# Patient Record
Sex: FEMALE | Race: ASIAN | NOT HISPANIC OR LATINO
[De-identification: names, ages, dates, MRNs, and addresses within clinical notes are randomized per-mention and may not be internally consistent; named-entity substitution may affect disease eponyms.]

---

## 2017-12-19 ENCOUNTER — APPOINTMENT (OUTPATIENT)
Dept: THORACIC SURGERY | Facility: CLINIC | Age: 77
End: 2017-12-19
Payer: MEDICARE

## 2017-12-19 VITALS
BODY MASS INDEX: 30.82 KG/M2 | HEART RATE: 66 BPM | HEIGHT: 65 IN | OXYGEN SATURATION: 96 % | DIASTOLIC BLOOD PRESSURE: 70 MMHG | SYSTOLIC BLOOD PRESSURE: 130 MMHG | WEIGHT: 185 LBS

## 2017-12-19 DIAGNOSIS — Z87.442 PERSONAL HISTORY OF URINARY CALCULI: ICD-10-CM

## 2017-12-19 DIAGNOSIS — M48.061 SPINAL STENOSIS, LUMBAR REGION WITHOUT NEUROGENIC CLAUDICATION: ICD-10-CM

## 2017-12-19 DIAGNOSIS — Z80.49 FAMILY HISTORY OF MALIGNANT NEOPLASM OF OTHER GENITAL ORGANS: ICD-10-CM

## 2017-12-19 DIAGNOSIS — Z87.39 PERSONAL HISTORY OF OTHER DISEASES OF THE MUSCULOSKELETAL SYSTEM AND CONNECTIVE TISSUE: ICD-10-CM

## 2017-12-19 DIAGNOSIS — Z80.3 FAMILY HISTORY OF MALIGNANT NEOPLASM OF BREAST: ICD-10-CM

## 2017-12-19 PROCEDURE — 99205 OFFICE O/P NEW HI 60 MIN: CPT

## 2018-01-03 ENCOUNTER — CHART COPY (OUTPATIENT)
Age: 78
End: 2018-01-03

## 2018-01-04 ENCOUNTER — OUTPATIENT (OUTPATIENT)
Dept: OUTPATIENT SERVICES | Facility: HOSPITAL | Age: 78
LOS: 1 days | End: 2018-01-04
Payer: MEDICARE

## 2018-01-04 ENCOUNTER — APPOINTMENT (OUTPATIENT)
Dept: PULMONOLOGY | Facility: CLINIC | Age: 78
End: 2018-01-04

## 2018-01-04 VITALS
RESPIRATION RATE: 14 BRPM | HEART RATE: 80 BPM | TEMPERATURE: 98 F | HEIGHT: 64 IN | WEIGHT: 188.05 LBS | SYSTOLIC BLOOD PRESSURE: 140 MMHG | DIASTOLIC BLOOD PRESSURE: 86 MMHG

## 2018-01-04 DIAGNOSIS — R91.8 OTHER NONSPECIFIC ABNORMAL FINDING OF LUNG FIELD: ICD-10-CM

## 2018-01-04 DIAGNOSIS — Z90.49 ACQUIRED ABSENCE OF OTHER SPECIFIED PARTS OF DIGESTIVE TRACT: Chronic | ICD-10-CM

## 2018-01-04 DIAGNOSIS — R06.83 SNORING: ICD-10-CM

## 2018-01-04 DIAGNOSIS — E11.9 TYPE 2 DIABETES MELLITUS WITHOUT COMPLICATIONS: ICD-10-CM

## 2018-01-04 DIAGNOSIS — R58 HEMORRHAGE, NOT ELSEWHERE CLASSIFIED: Chronic | ICD-10-CM

## 2018-01-04 LAB
BLD GP AB SCN SERPL QL: NEGATIVE — SIGNIFICANT CHANGE UP
BUN SERPL-MCNC: 19 MG/DL — SIGNIFICANT CHANGE UP (ref 7–23)
CALCIUM SERPL-MCNC: 9.4 MG/DL — SIGNIFICANT CHANGE UP (ref 8.4–10.5)
CHLORIDE SERPL-SCNC: 105 MMOL/L — SIGNIFICANT CHANGE UP (ref 98–107)
CO2 SERPL-SCNC: 26 MMOL/L — SIGNIFICANT CHANGE UP (ref 22–31)
CREAT SERPL-MCNC: 1.04 MG/DL — SIGNIFICANT CHANGE UP (ref 0.5–1.3)
GLUCOSE SERPL-MCNC: 115 MG/DL — HIGH (ref 70–99)
HBA1C BLD-MCNC: 6.9 % — HIGH (ref 4–5.6)
HCT VFR BLD CALC: 40.6 % — SIGNIFICANT CHANGE UP (ref 34.5–45)
HGB BLD-MCNC: 12.8 G/DL — SIGNIFICANT CHANGE UP (ref 11.5–15.5)
MCHC RBC-ENTMCNC: 30.9 PG — SIGNIFICANT CHANGE UP (ref 27–34)
MCHC RBC-ENTMCNC: 31.5 % — LOW (ref 32–36)
MCV RBC AUTO: 98.1 FL — SIGNIFICANT CHANGE UP (ref 80–100)
NRBC # FLD: 0 — SIGNIFICANT CHANGE UP
PLATELET # BLD AUTO: 188 K/UL — SIGNIFICANT CHANGE UP (ref 150–400)
PMV BLD: 10 FL — SIGNIFICANT CHANGE UP (ref 7–13)
POTASSIUM SERPL-MCNC: 4.7 MMOL/L — SIGNIFICANT CHANGE UP (ref 3.5–5.3)
POTASSIUM SERPL-SCNC: 4.7 MMOL/L — SIGNIFICANT CHANGE UP (ref 3.5–5.3)
RBC # BLD: 4.14 M/UL — SIGNIFICANT CHANGE UP (ref 3.8–5.2)
RBC # FLD: 13.4 % — SIGNIFICANT CHANGE UP (ref 10.3–14.5)
RH IG SCN BLD-IMP: POSITIVE — SIGNIFICANT CHANGE UP
SODIUM SERPL-SCNC: 144 MMOL/L — SIGNIFICANT CHANGE UP (ref 135–145)
WBC # BLD: 5.38 K/UL — SIGNIFICANT CHANGE UP (ref 3.8–10.5)
WBC # FLD AUTO: 5.38 K/UL — SIGNIFICANT CHANGE UP (ref 3.8–10.5)

## 2018-01-04 PROCEDURE — 93010 ELECTROCARDIOGRAM REPORT: CPT

## 2018-01-04 RX ORDER — SODIUM CHLORIDE 9 MG/ML
1000 INJECTION, SOLUTION INTRAVENOUS
Qty: 0 | Refills: 0 | Status: DISCONTINUED | OUTPATIENT
Start: 2018-01-10 | End: 2018-01-12

## 2018-01-04 RX ORDER — METFORMIN HYDROCHLORIDE 850 MG/1
0.5 TABLET ORAL
Qty: 0 | Refills: 0 | COMMUNITY

## 2018-01-04 RX ORDER — GLYBURIDE 5 MG
1 TABLET ORAL
Qty: 0 | Refills: 0 | COMMUNITY

## 2018-01-04 RX ORDER — GLYBURIDE 5 MG
0 TABLET ORAL
Qty: 0 | Refills: 0 | COMMUNITY

## 2018-01-04 RX ORDER — AZTREONAM 2 G
1 VIAL (EA) INJECTION
Qty: 0 | Refills: 0 | COMMUNITY

## 2018-01-04 RX ORDER — ERGOCALCIFEROL 1.25 MG/1
1 CAPSULE ORAL
Qty: 0 | Refills: 0 | COMMUNITY

## 2018-01-04 RX ORDER — METFORMIN HYDROCHLORIDE 850 MG/1
1 TABLET ORAL
Qty: 0 | Refills: 0 | COMMUNITY

## 2018-01-04 NOTE — H&P PST ADULT - SOURCE OF INFORMATION, PROFILE
patient/authorized [place] cell [phone] and daughter, Ama Cam [phone] to receive information; , Gen cell [phone]; home [phone] patient/[place]  # [id number]; authorized [place] cell [phone] and daughter, Ama Cam [phone] to receive information; , Gen cell [phone]; home [phone] patient/[place]  # [id number]; authorized daughter, Karen cell [phone] and daughter, Ama Cam [phone] to receive information; , Gen cell [phone]; home [phone]

## 2018-01-04 NOTE — H&P PST ADULT - ABILITY TO HEAR (WITH HEARING AID OR HEARING APPLIANCE IF NORMALLY USED):
Mildly to Moderately Impaired: difficulty hearing in some environments or speaker may need to increase volume or speak distinctly/Pt. feels she has mild hearing loss for her age.

## 2018-01-04 NOTE — H&P PST ADULT - PROBLEM SELECTOR PLAN 3
Await pre-arranged cardiac evaluation with cardiologist -- copy on chart  * Await medical evaluation with pcp due to fact that patient and daughter, Karen, were poor historians (changed answers multiple times with requirement for asking multiple questions to receive accurate answer)  * Await old ekg for comparison  * Notified OR booking via fax of DM type 2  * Need to notify surgeon of pre op medical evaluation Await pre-arranged cardiac evaluation with cardiologist -- copy on chart  * Await medical evaluation with pcp due to fact that patient and daughter, Karen, were poor historians (changed answers multiple times with requirement for asking multiple questions to receive accurate answer)  * Await old ekg for comparison and echo from cardiologist  * Notified OR booking via fax of DM type 2  * Need to notify surgeon of pre op medical evaluation Await pre-arranged cardiac evaluation with cardiologist -- copy on chart  * Await medical evaluation with pcp due to fact that patient and daughter, Karen, were poor historians (changed answers multiple times with requirement for asking multiple questions to receive accurate answer) and needs medical evaluation due to fact that patient is NOT taking diabetic medications as prescribed. PCP to determine what diabetic medications to take pre operatively  * Await old ekg for comparison and echo from cardiologist  * Notified OR booking via fax of DM type 2  * Need to notify surgeon of pre op medical evaluation Await pre-arranged cardiac evaluation with cardiologist -- copy on chart  * Await medical evaluation with pcp due to fact that patient and daughter, Karen, were poor historians (changed answers multiple times with requirement for asking multiple questions to receive accurate answer) and needs medical evaluation due to fact that patient is NOT taking diabetic medications as prescribed. PCP to determine what diabetic medications to take pre operatively  * Await old ekg for comparison and echo from cardiologist  * Notified OR booking via fax of DM type 2  * Need to notify surgeon of pre op medical evaluation--spoke to Ene in surgeon's office

## 2018-01-04 NOTE — H&P PST ADULT - ACTIVITY
c/o SOB with stair climbing patient questionned multiple times whether or not she gets SOB. With , she denied becoming SOB patient questioned multiple times whether or not she gets SOB. Through the , she denied becoming SOB

## 2018-01-04 NOTE — H&P PST ADULT - PROBLEM SELECTOR PLAN 1
This is a 78 y/o female who is scheduled for flexible bronch, right VATS, lung resection on 1-10-18  * Instructed to take normal am dose of    the am of surgery This is a 78 y/o female who is scheduled for flexible bronch, right VATS, lung resection on 1-10-18  * Instructed to take normal am dose of Levoxyl and atenolol the am of surgery

## 2018-01-04 NOTE — H&P PST ADULT - OTHER CARE PROVIDERS
urologist, Dr. Gonzalez Velasquez -- cell [phone]; pulmonologist -- ([facility] -- Dr. cedrick Caba [phone]

## 2018-01-04 NOTE — H&P PST ADULT - VISION (WITH CORRECTIVE LENSES IF THE PATIENT USUALLY WEARS THEM):
Glasses for reading./Partially impaired: cannot see medication labels or newsprint, but can see obstacles in path, and the surrounding layout; can count fingers at arm's length

## 2018-01-04 NOTE — H&P PST ADULT - ENDOCRINE COMMENTS
hypothyroid; DM type II -- does finger sticks in the am -- range 100-120 (patient cuts her diabetic oral medications in half -- ? if pcp is aware of this)

## 2018-01-04 NOTE — H&P PST ADULT - PMH
Diabetes mellitus  type II, diagnosed approximately 2008  Hypercholesterolemia    Hypothyroid    Language barrier  native language Mandarin, Chinese; comprehends and verbalizes no Englsih  Lung mass  b/l lung masses; right side has uptake on PET scan  Lung mass  right lung in Jan. 2018  Obesity    Snoring  BETITO precautions -- responds affirmatively to STOP BANG questionnaire -- admits to loud snoring; age > 50; h/o htn Arthritis    Diabetes mellitus  type II, diagnosed approximately 2008  Hypercholesterolemia    Hypothyroid    Insomnia    Language barrier  native language Mandarin, Chinese; comprehends and verbalizes no Englsih  Lung mass  b/l lung masses; right side has uptake on PET scan  Lung mass  right lung in Jan. 2018  Nephrolithiasis  9 mm kidney stone -- to start antibiotic today (1-4-18) and have placement of a stent by urologist before right VATS on 1-10-18  Obesity    Sciatica  injection done in Dec. 2017  Seasonal allergies    Snoring  BETITO precautions -- responds affirmatively to STOP BANG questionnaire -- admits to loud snoring; age > 50; h/o htn Arthritis    Diabetes mellitus  type II, diagnosed approximately 2008  Hypercholesterolemia    Hypertension    Hypothyroid    Insomnia    Language barrier  native language Mandarin, Chinese; comprehends and verbalizes no Englsih  Lung mass  b/l lung masses; right side has uptake on PET scan  Lung mass  right lung in Jan. 2018  Nephrolithiasis  9 mm kidney stone -- to start antibiotic today (1-4-18) and have placement of a stent by urologist before right VATS on 1-10-18  Obesity    Sciatica  injection done in Dec. 2017  Seasonal allergies    Snoring  BETITO precautions -- responds affirmatively to STOP BANG questionnaire -- admits to loud snoring; age > 50; h/o htn  Vitamin D deficiency

## 2018-01-04 NOTE — H&P PST ADULT - HISTORY OF PRESENT ILLNESS
This is a 76 y/o female whose native language is Chinese, Mandarin; comprehends and verbalizes no English. Patient required use of ; utilized [employer]  # [id number]. Patient and daughter are very poor historians despite use of . According to Dr. Young's consult of 12-19-17, patient presents with persistent cough. Unsuccessful treatment with antibiotics. Subsequent CT scan and PET scan revealed b/l lung nodules but only the right lung nodule had uptake with the PET scan. Intervention recommended. Scheduled for flexible bronch, right VATS, lung resection on 1-10-18 This is a 78 y/o female whose native language is Chinese, Mandarin; comprehends and verbalizes no English. Patient required use of ; utilized [employer]  # [id number]. Patient and daughter are very poor historians despite use of . According to Dr. Young's consult of 12-19-17, patient presents with persistent cough. Unsuccessful treatment with antibiotics. Subsequent CT scan and PET scan revealed b/l lung nodules but only the right lung nodule had uptake with the PET scan. Intervention recommended. Scheduled for flexible bronch, right VATS, lung resection on 1-10-18.  (NOTE: has 9 mm kidney stone with stent to be inserted on Friday 1-5-18 or on following Monday by the urologist)

## 2018-01-04 NOTE — H&P PST ADULT - PAIN CHRONIC, PROFILE
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  Steroid injection started 3 weeks ago."/yes

## 2018-01-04 NOTE — H&P PST ADULT - RESPIRATORY AND THORAX COMMENTS
c/o intermittent wheezing; has persistent cough;[phone]: c/o SOB with stair climbing has persistent cough; Denies SOB

## 2018-01-05 ENCOUNTER — APPOINTMENT (OUTPATIENT)
Dept: PULMONOLOGY | Facility: CLINIC | Age: 78
End: 2018-01-05
Payer: MEDICARE

## 2018-01-05 PROCEDURE — 94729 DIFFUSING CAPACITY: CPT

## 2018-01-05 PROCEDURE — 94726 PLETHYSMOGRAPHY LUNG VOLUMES: CPT

## 2018-01-05 PROCEDURE — 94010 BREATHING CAPACITY TEST: CPT

## 2018-01-09 ENCOUNTER — CHART COPY (OUTPATIENT)
Age: 78
End: 2018-01-09

## 2018-01-10 ENCOUNTER — INPATIENT (INPATIENT)
Facility: HOSPITAL | Age: 78
LOS: 3 days | Discharge: HOME | End: 2018-01-14
Attending: THORACIC SURGERY (CARDIOTHORACIC VASCULAR SURGERY) | Admitting: THORACIC SURGERY (CARDIOTHORACIC VASCULAR SURGERY)
Payer: MEDICARE

## 2018-01-10 ENCOUNTER — APPOINTMENT (OUTPATIENT)
Dept: THORACIC SURGERY | Facility: HOSPITAL | Age: 78
End: 2018-01-10
Payer: MEDICARE

## 2018-01-10 ENCOUNTER — RESULT REVIEW (OUTPATIENT)
Age: 78
End: 2018-01-10

## 2018-01-10 VITALS
SYSTOLIC BLOOD PRESSURE: 125 MMHG | RESPIRATION RATE: 17 BRPM | OXYGEN SATURATION: 96 % | DIASTOLIC BLOOD PRESSURE: 59 MMHG | HEIGHT: 64 IN | HEART RATE: 63 BPM | TEMPERATURE: 98 F | WEIGHT: 188.05 LBS

## 2018-01-10 DIAGNOSIS — R58 HEMORRHAGE, NOT ELSEWHERE CLASSIFIED: Chronic | ICD-10-CM

## 2018-01-10 DIAGNOSIS — Z90.49 ACQUIRED ABSENCE OF OTHER SPECIFIED PARTS OF DIGESTIVE TRACT: Chronic | ICD-10-CM

## 2018-01-10 DIAGNOSIS — R91.8 OTHER NONSPECIFIC ABNORMAL FINDING OF LUNG FIELD: ICD-10-CM

## 2018-01-10 LAB
GLUCOSE BLDC GLUCOMTR-MCNC: 120 MG/DL — HIGH (ref 70–99)
GLUCOSE BLDC GLUCOMTR-MCNC: 186 MG/DL — HIGH (ref 70–99)
GLUCOSE BLDC GLUCOMTR-MCNC: 188 MG/DL — HIGH (ref 70–99)
RH IG SCN BLD-IMP: POSITIVE — SIGNIFICANT CHANGE UP

## 2018-01-10 PROCEDURE — 99233 SBSQ HOSP IP/OBS HIGH 50: CPT

## 2018-01-10 PROCEDURE — S2900 ROBOTIC SURGICAL SYSTEM: CPT | Mod: NC

## 2018-01-10 PROCEDURE — 32663 THORACOSCOPY W/LOBECTOMY: CPT | Mod: AS

## 2018-01-10 PROCEDURE — 32663 THORACOSCOPY W/LOBECTOMY: CPT

## 2018-01-10 PROCEDURE — 32674 THORACOSCOPY LYMPH NODE EXC: CPT | Mod: AS

## 2018-01-10 PROCEDURE — 88309 TISSUE EXAM BY PATHOLOGIST: CPT | Mod: 26

## 2018-01-10 PROCEDURE — 71045 X-RAY EXAM CHEST 1 VIEW: CPT | Mod: 26

## 2018-01-10 PROCEDURE — 88305 TISSUE EXAM BY PATHOLOGIST: CPT | Mod: 26

## 2018-01-10 PROCEDURE — 88331 PATH CONSLTJ SURG 1 BLK 1SPC: CPT | Mod: 26

## 2018-01-10 PROCEDURE — 31622 DX BRONCHOSCOPE/WASH: CPT

## 2018-01-10 PROCEDURE — 32674 THORACOSCOPY LYMPH NODE EXC: CPT

## 2018-01-10 PROCEDURE — 88313 SPECIAL STAINS GROUP 2: CPT | Mod: 26

## 2018-01-10 RX ORDER — IPRATROPIUM/ALBUTEROL SULFATE 18-103MCG
3 AEROSOL WITH ADAPTER (GRAM) INHALATION EVERY 6 HOURS
Qty: 0 | Refills: 0 | Status: DISCONTINUED | OUTPATIENT
Start: 2018-01-10 | End: 2018-01-11

## 2018-01-10 RX ORDER — SIMVASTATIN 20 MG/1
20 TABLET, FILM COATED ORAL AT BEDTIME
Qty: 0 | Refills: 0 | Status: DISCONTINUED | OUTPATIENT
Start: 2018-01-10 | End: 2018-01-14

## 2018-01-10 RX ORDER — NALOXONE HYDROCHLORIDE 4 MG/.1ML
0.1 SPRAY NASAL
Qty: 0 | Refills: 0 | Status: DISCONTINUED | OUTPATIENT
Start: 2018-01-10 | End: 2018-01-12

## 2018-01-10 RX ORDER — INSULIN LISPRO 100/ML
VIAL (ML) SUBCUTANEOUS
Qty: 0 | Refills: 0 | Status: DISCONTINUED | OUTPATIENT
Start: 2018-01-10 | End: 2018-01-14

## 2018-01-10 RX ORDER — HYDROMORPHONE HYDROCHLORIDE 2 MG/ML
0.5 INJECTION INTRAMUSCULAR; INTRAVENOUS; SUBCUTANEOUS
Qty: 0 | Refills: 0 | Status: DISCONTINUED | OUTPATIENT
Start: 2018-01-10 | End: 2018-01-12

## 2018-01-10 RX ORDER — DOCUSATE SODIUM 100 MG
100 CAPSULE ORAL THREE TIMES A DAY
Qty: 0 | Refills: 0 | Status: DISCONTINUED | OUTPATIENT
Start: 2018-01-10 | End: 2018-01-14

## 2018-01-10 RX ORDER — DEXTROSE 50 % IN WATER 50 %
25 SYRINGE (ML) INTRAVENOUS ONCE
Qty: 0 | Refills: 0 | Status: DISCONTINUED | OUTPATIENT
Start: 2018-01-10 | End: 2018-01-14

## 2018-01-10 RX ORDER — HEPARIN SODIUM 5000 [USP'U]/ML
5000 INJECTION INTRAVENOUS; SUBCUTANEOUS EVERY 8 HOURS
Qty: 0 | Refills: 0 | Status: DISCONTINUED | OUTPATIENT
Start: 2018-01-10 | End: 2018-01-14

## 2018-01-10 RX ORDER — PANTOPRAZOLE SODIUM 20 MG/1
40 TABLET, DELAYED RELEASE ORAL
Qty: 0 | Refills: 0 | Status: DISCONTINUED | OUTPATIENT
Start: 2018-01-10 | End: 2018-01-14

## 2018-01-10 RX ORDER — DEXTROSE 50 % IN WATER 50 %
12.5 SYRINGE (ML) INTRAVENOUS ONCE
Qty: 0 | Refills: 0 | Status: DISCONTINUED | OUTPATIENT
Start: 2018-01-10 | End: 2018-01-14

## 2018-01-10 RX ORDER — GLUCAGON INJECTION, SOLUTION 0.5 MG/.1ML
1 INJECTION, SOLUTION SUBCUTANEOUS ONCE
Qty: 0 | Refills: 0 | Status: DISCONTINUED | OUTPATIENT
Start: 2018-01-10 | End: 2018-01-14

## 2018-01-10 RX ORDER — ACETAMINOPHEN 500 MG
1000 TABLET ORAL ONCE
Qty: 0 | Refills: 0 | Status: DISCONTINUED | OUTPATIENT
Start: 2018-01-11 | End: 2018-01-14

## 2018-01-10 RX ORDER — SODIUM CHLORIDE 9 MG/ML
1000 INJECTION, SOLUTION INTRAVENOUS
Qty: 0 | Refills: 0 | Status: DISCONTINUED | OUTPATIENT
Start: 2018-01-10 | End: 2018-01-14

## 2018-01-10 RX ORDER — HYDROMORPHONE HYDROCHLORIDE 2 MG/ML
30 INJECTION INTRAMUSCULAR; INTRAVENOUS; SUBCUTANEOUS
Qty: 0 | Refills: 0 | Status: DISCONTINUED | OUTPATIENT
Start: 2018-01-10 | End: 2018-01-12

## 2018-01-10 RX ORDER — DIPHENHYDRAMINE HCL 50 MG
25 CAPSULE ORAL EVERY 4 HOURS
Qty: 0 | Refills: 0 | Status: DISCONTINUED | OUTPATIENT
Start: 2018-01-10 | End: 2018-01-12

## 2018-01-10 RX ORDER — LEVOTHYROXINE SODIUM 125 MCG
75 TABLET ORAL DAILY
Qty: 0 | Refills: 0 | Status: DISCONTINUED | OUTPATIENT
Start: 2018-01-10 | End: 2018-01-14

## 2018-01-10 RX ORDER — ONDANSETRON 8 MG/1
4 TABLET, FILM COATED ORAL EVERY 6 HOURS
Qty: 0 | Refills: 0 | Status: DISCONTINUED | OUTPATIENT
Start: 2018-01-10 | End: 2018-01-12

## 2018-01-10 RX ORDER — ACETAMINOPHEN 500 MG
1000 TABLET ORAL ONCE
Qty: 0 | Refills: 0 | Status: COMPLETED | OUTPATIENT
Start: 2018-01-10 | End: 2018-01-10

## 2018-01-10 RX ORDER — DEXTROSE 50 % IN WATER 50 %
1 SYRINGE (ML) INTRAVENOUS ONCE
Qty: 0 | Refills: 0 | Status: DISCONTINUED | OUTPATIENT
Start: 2018-01-10 | End: 2018-01-14

## 2018-01-10 RX ORDER — SENNA PLUS 8.6 MG/1
2 TABLET ORAL AT BEDTIME
Qty: 0 | Refills: 0 | Status: DISCONTINUED | OUTPATIENT
Start: 2018-01-10 | End: 2018-01-14

## 2018-01-10 RX ADMIN — Medication 100 MILLIGRAM(S): at 13:58

## 2018-01-10 RX ADMIN — Medication 1000 MILLIGRAM(S): at 17:00

## 2018-01-10 RX ADMIN — HYDROMORPHONE HYDROCHLORIDE 30 MILLILITER(S): 2 INJECTION INTRAMUSCULAR; INTRAVENOUS; SUBCUTANEOUS at 18:07

## 2018-01-10 RX ADMIN — Medication 100 MILLIGRAM(S): at 22:17

## 2018-01-10 RX ADMIN — Medication 3 MILLILITER(S): at 16:33

## 2018-01-10 RX ADMIN — Medication 1: at 16:12

## 2018-01-10 RX ADMIN — SENNA PLUS 2 TABLET(S): 8.6 TABLET ORAL at 22:17

## 2018-01-10 RX ADMIN — Medication 400 MILLIGRAM(S): at 16:38

## 2018-01-10 RX ADMIN — HEPARIN SODIUM 5000 UNIT(S): 5000 INJECTION INTRAVENOUS; SUBCUTANEOUS at 13:58

## 2018-01-10 RX ADMIN — SIMVASTATIN 20 MILLIGRAM(S): 20 TABLET, FILM COATED ORAL at 22:17

## 2018-01-10 RX ADMIN — Medication 1 TABLET(S): at 17:33

## 2018-01-10 RX ADMIN — Medication 3 MILLILITER(S): at 23:57

## 2018-01-10 RX ADMIN — HEPARIN SODIUM 5000 UNIT(S): 5000 INJECTION INTRAVENOUS; SUBCUTANEOUS at 22:17

## 2018-01-10 RX ADMIN — HYDROMORPHONE HYDROCHLORIDE 30 MILLILITER(S): 2 INJECTION INTRAMUSCULAR; INTRAVENOUS; SUBCUTANEOUS at 19:39

## 2018-01-10 RX ADMIN — SODIUM CHLORIDE 30 MILLILITER(S): 9 INJECTION, SOLUTION INTRAVENOUS at 19:42

## 2018-01-10 RX ADMIN — SODIUM CHLORIDE 30 MILLILITER(S): 9 INJECTION, SOLUTION INTRAVENOUS at 11:00

## 2018-01-10 NOTE — BRIEF OPERATIVE NOTE - COMMENTS
Suction catheter left in during RULobe bronchus resection, proximal staple line visualized w/ flex bronch w/ distorted stable, distal suction catheter retrieved from sample. Distal stable line intact, no airleak under direct visualization w/ chest irrigation

## 2018-01-10 NOTE — ASU PREOP CHECKLIST - SELECT TESTS ORDERED
BMP/CBC/Type and Cross/Type and Screen/EKG BMP//CBC/Type and Cross/Type and Screen/POCT Blood Glucose/EKG

## 2018-01-10 NOTE — ASU PATIENT PROFILE, ADULT - VISION (WITH CORRECTIVE LENSES IF THE PATIENT USUALLY WEARS THEM):
Partially impaired: cannot see medication labels or newsprint, but can see obstacles in path, and the surrounding layout; can count fingers at arm's length/Glasses for reading.

## 2018-01-10 NOTE — BRIEF OPERATIVE NOTE - PROCEDURE
<<-----Click on this checkbox to enter Procedure VATS (video-assisted thoracoscopic surgery)  01/10/2018  Flex bronch, RVATS, RULobectomy, RML wedge, MLND  Active  [id number]

## 2018-01-10 NOTE — PROGRESS NOTE ADULT - SUBJECTIVE AND OBJECTIVE BOX
[address]            MRN-[id number]         No Known Allergies                 HPI:  This is a 76 y/o female whose native language is Chinese, Mandarin; comprehends and verbalizes no English. Patient required use of ; utilized [employer]  # [id number]. Patient and daughter are very poor historians despite use of . According to Dr. Young's consult of 12-19-17, patient presents with persistent cough. Unsuccessful treatment with antibiotics. Subsequent CT scan and PET scan revealed b/l lung nodules but only the right lung nodule had uptake with the PET scan. Intervention recommended. Scheduled for flexible bronch, right VATS, lung resection on 1-10-18.  (NOTE: has 9 mm kidney stone with stent to be inserted on Friday 1-5-18 or on following Monday by the urologist) (04 Jan 2018 12:51)      Procedure: Flex bronch, RVATS, RULobectomy, RML wedge, MLND   POD# 0    Issues:  Lung cancer  Postop pain  HTN  HLD  DM-2               Home Medications:  atenolol 50 mg oral tablet: 1 tab(s) orally once a day. AM (10 Jeremiah 2018 06:48)  glucosamine: 1 tab(s) orally once a day. Last dose 1/4/18 (10 Jeremiah 2018 06:48)  glyBURIDE 3 mg orally at night -- patient not taking medication as prescribed. She only takes 1/2 tablet at night:  (10 Jeremiah 2018 06:48)  Levoxyl 75 mcg (0.075 mg) oral tablet: 1 tab(s) orally once a day. AM (10 Jeremiah 2018 06:48)  metFORMIN 850 mg orally in the evening. Patient not taking medication as prescribed. She only takes 1/2 the tablet after dinner:  (10 Jeremiah 2018 06:48)  simvastatin 20 mg oral tablet: 1 tab(s) orally once a day (at bedtime) (10 Jeremiah 2018 06:48)  sulfamethoxazole-trimethoprim 800 mg-160 mg oral tablet: 1 tab(s) orally 2 times a day (10 Jeremiah 2018 06:48)  Therapeutic Multiple Vitamins oral tablet: 1 tab(s) orally once a day. LAst dose 1/4/18 (10 Jeremiah 2018 06:48)  Vitamin D2 50,000 intl units (1.25 mg) oral capsule: 1 cap(s) orally once a week. Friday (10 Jeremiah 2018 06:48)  zolpidem 10 mg oral tablet: 0.5 tab(s) orally once a day (at bedtime), As Needed (10 Jeremiah 2018 06:48)      PAST MEDICAL & SURGICAL HISTORY:  Vitamin D deficiency  Hypertension  Arthritis  Sciatica: injection done in Dec. 2017  Insomnia  Seasonal allergies  Nephrolithiasis: 9 mm kidney stone -- to start antibiotic today (1-4-18) and have placement of a stent by urologist before right VATS on 1-10-18  Snoring: BETITO precautions -- responds affirmatively to STOP BANG questionnaire -- admits to loud snoring; age &gt; 50; h/o htn  Lung mass: b/l lung masses; right side has uptake on PET scan  Lung mass: right lung in Jan. 2018  Obesity  Hypercholesterolemia  Diabetes mellitus: type II, diagnosed approximately 2008  Hypothyroid  Language barrier: native language Mandarin, Chinese; comprehends and verbalizes no Englsih  S/P appendectomy: greater than 30 years ago  Bleeding: hysterectomy 1985        ICU Vital Signs Last 24 Hrs  T(C): 35.3 (10 Jeremiah 2018 11:10), Max: 36.4 (10 Jeremiah 2018 06:17)  T(F): 95.6 (10 Jeremiah 2018 11:10), Max: 97.5 (10 Jeremiah 2018 06:17)  HR: 85 (10 Jeremiah 2018 12:00) (63 - 128)  BP: 134/77 (10 Jeremiah 2018 12:00) (84/34 - 136/71)  BP(mean): 87 (10 Jeremiah 2018 12:00) (44 - 87)  ABP: --  ABP(mean): --  RR: 16 (10 Jeremiah 2018 12:00) (12 - 19)  SpO2: 100% (10 Jeremiah 2018 12:00) (96% - 100%)    I&O's Detail    10 Jeremiah 2018 07:01  -  10 Jeremiah 2018 12:35  --------------------------------------------------------  IN:    lactated ringers.: 60 mL  Total IN: 60 mL    OUT:    Chest Tube: 196 mL  Total OUT: 196 mL    Total NET: -136 mL        CAPILLARY BLOOD GLUCOSE      POCT Blood Glucose.: 186 mg/dL (10 Jeremiah 2018 11:50)      Home Medications:  atenolol 50 mg oral tablet: 1 tab(s) orally once a day. AM (10 Jeremiah 2018 06:48)  glucosamine: 1 tab(s) orally once a day. Last dose 1/4/18 (10 Jeremiah 2018 06:48)  glyBURIDE 3 mg orally at night -- patient not taking medication as prescribed. She only takes 1/2 tablet at night:  (10 Jeremiah 2018 06:48)  Levoxyl 75 mcg (0.075 mg) oral tablet: 1 tab(s) orally once a day. AM (10 Jeremiah 2018 06:48)  metFORMIN 850 mg orally in the evening. Patient not taking medication as prescribed. She only takes 1/2 the tablet after dinner:  (10 Jeremiah 2018 06:48)  simvastatin 20 mg oral tablet: 1 tab(s) orally once a day (at bedtime) (10 Jeremiah 2018 06:48)  sulfamethoxazole-trimethoprim 800 mg-160 mg oral tablet: 1 tab(s) orally 2 times a day (10 Jeremiah 2018 06:48)  Therapeutic Multiple Vitamins oral tablet: 1 tab(s) orally once a day. LAst dose 1/4/18 (10 Jeremiah 2018 06:48)  Vitamin D2 50,000 intl units (1.25 mg) oral capsule: 1 cap(s) orally once a week. Friday (10 Jeremiah 2018 06:48)  zolpidem 10 mg oral tablet: 0.5 tab(s) orally once a day (at bedtime), As Needed (10 Jeremiah 2018 06:48)      MEDICATIONS  (STANDING):  ALBUTerol/ipratropium for Nebulization 3 milliLiter(s) Nebulizer every 6 hours  dextrose 5%. 1000 milliLiter(s) (50 mL/Hr) IV Continuous <Continuous>  dextrose 50% Injectable 12.5 Gram(s) IV Push once  dextrose 50% Injectable 25 Gram(s) IV Push once  dextrose 50% Injectable 25 Gram(s) IV Push once  docusate sodium 100 milliGRAM(s) Oral three times a day  heparin  Injectable 5000 Unit(s) SubCutaneous every 8 hours  HYDROmorphone PCA (1 mG/mL) 30 milliLiter(s) PCA Continuous PCA Continuous  insulin lispro (HumaLOG) corrective regimen sliding scale   SubCutaneous three times a day before meals  lactated ringers. 1000 milliLiter(s) (30 mL/Hr) IV Continuous <Continuous>  levothyroxine 75 MICROGram(s) Oral daily  pantoprazole    Tablet 40 milliGRAM(s) Oral before breakfast  senna 2 Tablet(s) Oral at bedtime  simvastatin 20 milliGRAM(s) Oral at bedtime  trimethoprim  160 mG/sulfamethoxazole 800 mG 1 Tablet(s) Oral two times a day    MEDICATIONS  (PRN):  dextrose Gel 1 Dose(s) Oral once PRN Blood Glucose LESS THAN 70 milliGRAM(s)/deciliter  diphenhydrAMINE   Injectable 25 milliGRAM(s) IV Push every 4 hours PRN Pruritus  glucagon  Injectable 1 milliGRAM(s) IntraMuscular once PRN Glucose LESS THAN 70 milligrams/deciliter  HYDROmorphone PCA (1 mG/mL) Rescue Clinician Bolus 0.5 milliGRAM(s) IV Push every 15 minutes PRN for Pain Scale GREATER THAN 6  naloxone Injectable 0.1 milliGRAM(s) IV Push every 3 minutes PRN For ANY of the following changes in patient status:  A. RR LESS THAN 10 breaths per minute, B. Oxygen saturation LESS THAN 90%, C. Sedation score of 6  ondansetron Injectable 4 milliGRAM(s) IV Push every 6 hours PRN Nausea      Physical exam:                             General:               Pt is awake, not in any distress                                                  Neuro:                  Nonfocal                             Cardiovascular:   S1 & S2, regular                           Respiratory:         Air entry is fair and equal on both sides, has bilateral conducted sounds                           GI:                          Soft, nondistended and nontender, Bowel sounds active                            Ext:                        No cyanosis or edema     Labs:                                                                CXR:   Rt side chest tube in place, postop changes.      Plan:    General: 77yFemale s/p Flex bronch, RVATS, RULobectomy, RML wedge, MLND  POD#0  progressing well, experiencing  pain with deep breathing.                             Neuro:                                         Pain control with PCA / Tylenol IV                            Cardiovascular:                                          Continue hemodynamic monitoring.    HTN: Restart Atenolol    HLD: Restart Lipitor                            Respiratory:                                         Pt is on 2L  nasal canula,                                           Comfortable, not in any distress.                                         Using incentive spirometry                                          Monitor chest tube output                                         Chest tube to suction                                                                  Continue bronchodilators, pulmonary toilet                            GI                                         On clear liquids                                         Continue GI prophylaxis with Protonix                                         Continue Zofran / Reglan for nausea - PRN	                                                                 Renal:                                         Continue LR 30cc/hr                                         Monitor I/Os and electrolytes                                         Parker                                                  Hem/ Onc:                                                                                  Monitor chest tube output &  signs of bleeding.                                          Follow CBC in AM                           Infectious disease:                                            No signs of infection. Monitor for fever / leukocytosis.                                          All surgical incision / chest tube  sites look clean                            Endocrine                                            DM-2: Continue Accu-Checks with coverage. Hold oral hypoglycemic meds    Pt is on SQ Heparin and Venodyne boots for DVT prophylaxis.     Pertinent clinical, laboratory, radiographic, hemodynamic, echocardiographic, respiratory data, microbiologic data and chart were reviewed and analyzed frequently throughout the course of the day and night  Patient seen, examined and plan discussed with CT Surgeon / CTICU team during rounds.    Pt's status discussed with family at bedside, updated status            Rodrick Buitrago MD

## 2018-01-10 NOTE — ASU PATIENT PROFILE, ADULT - PMH
Arthritis    Diabetes mellitus  type II, diagnosed approximately 2008  Hypercholesterolemia    Hypertension    Hypothyroid    Insomnia    Language barrier  native language Mandarin, Chinese; comprehends and verbalizes no Englsih  Lung mass  b/l lung masses; right side has uptake on PET scan  Lung mass  right lung in Jan. 2018  Nephrolithiasis  9 mm kidney stone -- to start antibiotic today (1-4-18) and have placement of a stent by urologist before right VATS on 1-10-18  Obesity    Sciatica  injection done in Dec. 2017  Seasonal allergies    Snoring  BETITO precautions -- responds affirmatively to STOP BANG questionnaire -- admits to loud snoring; age > 50; h/o htn  Vitamin D deficiency

## 2018-01-11 LAB
BUN SERPL-MCNC: 15 MG/DL — SIGNIFICANT CHANGE UP (ref 7–23)
CALCIUM SERPL-MCNC: 8.3 MG/DL — LOW (ref 8.4–10.5)
CHLORIDE SERPL-SCNC: 102 MMOL/L — SIGNIFICANT CHANGE UP (ref 98–107)
CO2 SERPL-SCNC: 24 MMOL/L — SIGNIFICANT CHANGE UP (ref 22–31)
CREAT SERPL-MCNC: 0.95 MG/DL — SIGNIFICANT CHANGE UP (ref 0.5–1.3)
GLUCOSE BLDC GLUCOMTR-MCNC: 135 MG/DL — HIGH (ref 70–99)
GLUCOSE BLDC GLUCOMTR-MCNC: 142 MG/DL — HIGH (ref 70–99)
GLUCOSE BLDC GLUCOMTR-MCNC: 151 MG/DL — HIGH (ref 70–99)
GLUCOSE SERPL-MCNC: 153 MG/DL — HIGH (ref 70–99)
HCT VFR BLD CALC: 37.8 % — SIGNIFICANT CHANGE UP (ref 34.5–45)
HGB BLD-MCNC: 12.3 G/DL — SIGNIFICANT CHANGE UP (ref 11.5–15.5)
MCHC RBC-ENTMCNC: 32.5 % — SIGNIFICANT CHANGE UP (ref 32–36)
MCHC RBC-ENTMCNC: 32.7 PG — SIGNIFICANT CHANGE UP (ref 27–34)
MCV RBC AUTO: 100.5 FL — HIGH (ref 80–100)
NRBC # FLD: 0 — SIGNIFICANT CHANGE UP
PLATELET # BLD AUTO: 146 K/UL — LOW (ref 150–400)
PMV BLD: 9.7 FL — SIGNIFICANT CHANGE UP (ref 7–13)
POTASSIUM SERPL-MCNC: 4.7 MMOL/L — SIGNIFICANT CHANGE UP (ref 3.5–5.3)
POTASSIUM SERPL-SCNC: 4.7 MMOL/L — SIGNIFICANT CHANGE UP (ref 3.5–5.3)
RBC # BLD: 3.76 M/UL — LOW (ref 3.8–5.2)
RBC # FLD: 13.3 % — SIGNIFICANT CHANGE UP (ref 10.3–14.5)
SODIUM SERPL-SCNC: 139 MMOL/L — SIGNIFICANT CHANGE UP (ref 135–145)
WBC # BLD: 7.97 K/UL — SIGNIFICANT CHANGE UP (ref 3.8–10.5)
WBC # FLD AUTO: 7.97 K/UL — SIGNIFICANT CHANGE UP (ref 3.8–10.5)

## 2018-01-11 PROCEDURE — 71045 X-RAY EXAM CHEST 1 VIEW: CPT | Mod: 26

## 2018-01-11 PROCEDURE — 99233 SBSQ HOSP IP/OBS HIGH 50: CPT

## 2018-01-11 RX ORDER — ZOLPIDEM TARTRATE 10 MG/1
5 TABLET ORAL AT BEDTIME
Qty: 0 | Refills: 0 | Status: DISCONTINUED | OUTPATIENT
Start: 2018-01-11 | End: 2018-01-14

## 2018-01-11 RX ORDER — LIDOCAINE 4 G/100G
1 CREAM TOPICAL DAILY
Qty: 0 | Refills: 0 | Status: DISCONTINUED | OUTPATIENT
Start: 2018-01-11 | End: 2018-01-14

## 2018-01-11 RX ORDER — MAGNESIUM SULFATE 500 MG/ML
1 VIAL (ML) INJECTION ONCE
Qty: 0 | Refills: 0 | Status: DISCONTINUED | OUTPATIENT
Start: 2018-01-11 | End: 2018-01-12

## 2018-01-11 RX ORDER — ATENOLOL 25 MG/1
25 TABLET ORAL DAILY
Qty: 0 | Refills: 0 | Status: DISCONTINUED | OUTPATIENT
Start: 2018-01-11 | End: 2018-01-12

## 2018-01-11 RX ORDER — KETOROLAC TROMETHAMINE 30 MG/ML
30 SYRINGE (ML) INJECTION ONCE
Qty: 0 | Refills: 0 | Status: DISCONTINUED | OUTPATIENT
Start: 2018-01-11 | End: 2018-01-11

## 2018-01-11 RX ADMIN — SODIUM CHLORIDE 30 MILLILITER(S): 9 INJECTION, SOLUTION INTRAVENOUS at 18:24

## 2018-01-11 RX ADMIN — PANTOPRAZOLE SODIUM 40 MILLIGRAM(S): 20 TABLET, DELAYED RELEASE ORAL at 05:38

## 2018-01-11 RX ADMIN — Medication 3 MILLILITER(S): at 04:43

## 2018-01-11 RX ADMIN — Medication 3 MILLILITER(S): at 11:04

## 2018-01-11 RX ADMIN — SENNA PLUS 2 TABLET(S): 8.6 TABLET ORAL at 23:02

## 2018-01-11 RX ADMIN — HEPARIN SODIUM 5000 UNIT(S): 5000 INJECTION INTRAVENOUS; SUBCUTANEOUS at 05:37

## 2018-01-11 RX ADMIN — SODIUM CHLORIDE 30 MILLILITER(S): 9 INJECTION, SOLUTION INTRAVENOUS at 07:16

## 2018-01-11 RX ADMIN — HEPARIN SODIUM 5000 UNIT(S): 5000 INJECTION INTRAVENOUS; SUBCUTANEOUS at 23:03

## 2018-01-11 RX ADMIN — Medication 75 MICROGRAM(S): at 05:37

## 2018-01-11 RX ADMIN — Medication 100 MILLIGRAM(S): at 05:37

## 2018-01-11 RX ADMIN — Medication 1 TABLET(S): at 05:37

## 2018-01-11 RX ADMIN — Medication 3 MILLILITER(S): at 15:57

## 2018-01-11 RX ADMIN — HEPARIN SODIUM 5000 UNIT(S): 5000 INJECTION INTRAVENOUS; SUBCUTANEOUS at 13:01

## 2018-01-11 RX ADMIN — HYDROMORPHONE HYDROCHLORIDE 30 MILLILITER(S): 2 INJECTION INTRAMUSCULAR; INTRAVENOUS; SUBCUTANEOUS at 19:09

## 2018-01-11 RX ADMIN — Medication 100 MILLIGRAM(S): at 13:01

## 2018-01-11 RX ADMIN — HYDROMORPHONE HYDROCHLORIDE 30 MILLILITER(S): 2 INJECTION INTRAMUSCULAR; INTRAVENOUS; SUBCUTANEOUS at 07:17

## 2018-01-11 RX ADMIN — Medication 1 TABLET(S): at 17:18

## 2018-01-11 RX ADMIN — SIMVASTATIN 20 MILLIGRAM(S): 20 TABLET, FILM COATED ORAL at 23:03

## 2018-01-11 RX ADMIN — Medication 30 MILLIGRAM(S): at 22:40

## 2018-01-11 RX ADMIN — HYDROMORPHONE HYDROCHLORIDE 30 MILLILITER(S): 2 INJECTION INTRAMUSCULAR; INTRAVENOUS; SUBCUTANEOUS at 18:22

## 2018-01-11 RX ADMIN — Medication 30 MILLIGRAM(S): at 22:14

## 2018-01-11 RX ADMIN — Medication 100 MILLIGRAM(S): at 23:02

## 2018-01-11 RX ADMIN — Medication 1: at 17:18

## 2018-01-11 NOTE — PROGRESS NOTE ADULT - SUBJECTIVE AND OBJECTIVE BOX
[address]  MRN-[id number]    HPI:  This is a 78 y/o female whose native language is Chinese, Mandarin; comprehends and verbalizes no English. Patient required use of ; utilized [employer]  # [id number]. Patient and daughter are very poor historians despite use of . According to Dr. Young's consult of 12-19-17, patient presents with persistent cough. Unsuccessful treatment with antibiotics. Subsequent CT scan and PET scan revealed b/l lung nodules but only the right lung nodule had uptake with the PET scan. Intervention recommended. Scheduled for flexible bronch, right VATS, lung resection on 1-10-18.  (NOTE: has 9 mm kidney stone with stent to be inserted on Friday 1-5-18 or on following Monday by the urologist) (04 Jan 2018 12:51)      Procedure: Flex bronch, RVATS, RULobectomy, RML wedge, MLND     POD # : 1    Issues:  Lung cancer  Postop pain  HTN  HLD  DM-2      PAST MEDICAL & SURGICAL HISTORY:  Vitamin D deficiency  Hypertension  Arthritis  Sciatica: injection done in Dec. 2017  Insomnia  Seasonal allergies  Nephrolithiasis: 9 mm kidney stone -- to start antibiotic today (1-4-18) and have placement of a stent by urologist before right VATS on 1-10-18  Snoring: BETITO precautions -- responds affirmatively to STOP BANG questionnaire -- admits to loud snoring; age &gt; 50; h/o htn  Lung mass: b/l lung masses; right side has uptake on PET scan  Lung mass: right lung in Jan. 2018  Obesity  Hypercholesterolemia  Diabetes mellitus: type II, diagnosed approximately 2008  Hypothyroid  Language barrier: native language Mandarin, Chinese; comprehends and verbalizes no Englsih  S/P appendectomy: greater than 30 years ago  Bleeding: hysterectomy 1985      ***VITAL SIGNS:  Vital Signs Last 24 Hrs  T(C): 37.3 (11 Jan 2018 12:00), Max: 37.3 (11 Jan 2018 12:00)  T(F): 99.2 (11 Jan 2018 12:00), Max: 99.2 (11 Jan 2018 12:00)  HR: 103 (11 Jan 2018 14:00) (82 - 103)  BP: 110/66 (11 Jan 2018 14:00) (93/54 - 125/67)  BP(mean): 76 (11 Jan 2018 14:00) (53 - 79)  RR: 21 (11 Jan 2018 14:00) (10 - 24)  SpO2: 96% (11 Jan 2018 14:00) (93% - 100%)  I/Os:   I&O's Detail    10 Jeremiah 2018 07:01  -  11 Jan 2018 07:00  --------------------------------------------------------  IN:    lactated ringers.: 630 mL  Total IN: 630 mL    OUT:    Chest Tube: 346 mL    Voided: 450 mL  Total OUT: 796 mL    Total NET: -166 mL      11 Jan 2018 07:01  -  11 Jan 2018 15:08  --------------------------------------------------------  IN:    lactated ringers.: 210 mL    Oral Fluid: 480 mL  Total IN: 690 mL    OUT:    Chest Tube: 40 mL    Voided: 300 mL  Total OUT: 340 mL    Total NET: 350 mL        CAPILLARY BLOOD GLUCOSE      POCT Blood Glucose.: 135 mg/dL (11 Jan 2018 11:21)  POCT Blood Glucose.: 142 mg/dL (11 Jan 2018 07:46)  POCT Blood Glucose.: 188 mg/dL (10 Jeremiah 2018 16:06)        ======================= PATIENT CARE ACCESS DEVICES ===================  Peripheral IV    ======================= PHYSICAL EXAM============================  General:                         Awake, alert, not in any distress. in chair  Neuro:                            Moving all extremities to commands. Nonfocal	  Respiratory:	Lungs clear to auscultation bilaterally. Good aeration.                                           Chest tube to suction. no leak  CV:		Regular rate and rhythm. Normal S1/S2. No murmurs                                          Distal pulses present.  Abdomen:	                     Soft, non-distended. Bowel sounds present    Skin:		No rash.  Extremities:	Warm, no cyanosis or edema.  Palpable pulses    ============================ LABS =========================                        12.3   7.97  )-----------( 146      ( 11 Jan 2018 04:40 )             37.8     01-11    139  |  102  |  15  ----------------------------<  153<H>  4.7   |  24  |  0.95    Ca    8.3<L>      11 Jan 2018 04:40          =======================  MEDICATIONS  =================  MEDICATIONS  (STANDING):  ALBUTerol/ipratropium for Nebulization 3 milliLiter(s) Nebulizer every 6 hours  ATENolol  Tablet 25 milliGRAM(s) Oral daily  dextrose 5%. 1000 milliLiter(s) (50 mL/Hr) IV Continuous <Continuous>  dextrose 50% Injectable 12.5 Gram(s) IV Push once  dextrose 50% Injectable 25 Gram(s) IV Push once  dextrose 50% Injectable 25 Gram(s) IV Push once  docusate sodium 100 milliGRAM(s) Oral three times a day  heparin  Injectable 5000 Unit(s) SubCutaneous every 8 hours  HYDROmorphone PCA (1 mG/mL) 30 milliLiter(s) PCA Continuous PCA Continuous  insulin lispro (HumaLOG) corrective regimen sliding scale   SubCutaneous three times a day before meals  lactated ringers. 1000 milliLiter(s) (30 mL/Hr) IV Continuous <Continuous>  levothyroxine 75 MICROGram(s) Oral daily  pantoprazole    Tablet 40 milliGRAM(s) Oral before breakfast  senna 2 Tablet(s) Oral at bedtime  simvastatin 20 milliGRAM(s) Oral at bedtime  trimethoprim  160 mG/sulfamethoxazole 800 mG 1 Tablet(s) Oral two times a day    MEDICATIONS  (PRN):  acetaminophen  IVPB. 1000 milliGRAM(s) IV Intermittent once PRN Moderate Pain (4 - 6)  dextrose Gel 1 Dose(s) Oral once PRN Blood Glucose LESS THAN 70 milliGRAM(s)/deciliter  diphenhydrAMINE   Injectable 25 milliGRAM(s) IV Push every 4 hours PRN Pruritus  glucagon  Injectable 1 milliGRAM(s) IntraMuscular once PRN Glucose LESS THAN 70 milligrams/deciliter  HYDROmorphone PCA (1 mG/mL) Rescue Clinician Bolus 0.5 milliGRAM(s) IV Push every 15 minutes PRN for Pain Scale GREATER THAN 6  naloxone Injectable 0.1 milliGRAM(s) IV Push every 3 minutes PRN For ANY of the following changes in patient status:  A. RR LESS THAN 10 breaths per minute, B. Oxygen saturation LESS THAN 90%, C. Sedation score of 6  ondansetron Injectable 4 milliGRAM(s) IV Push every 6 hours PRN Nausea      ====================== NEUROLOGY=====================  Pain control with PCA / Tylenol IV       ==================== RESPIRATORY======================  Pt is on     2 L nasal canula   Comfortable, not in any distress.  Using incentive spirometry   Monitor chest tube output  Chest tube to suction	  Continue bronchodilators, pulmonary toilet    ====================CARDIOVASCULAR==================  Continue hemodynamic monitoring.  Not on any pressors  Continue cardiovascular / antihypertensive medications: On lipitor and atenolo    ===================== RENAL =========================  Continue  IVF  Monitor I/Os and electrolytes    ==================== GASTROINTESTINAL===================  On regular diet, tolerating  Continue GI prophylaxis with Protonix      =======================    ENDOCRIN  =====================  Glycemic monitoring  F/S with coverage  ===================HEMATOLOGIC/ONC ===================  Monitor chest tube output. No signs of active bleeding.   Follow CBC in AM  DVT prophylaxis with SCD, sc Heparin    ========================INFECTIOUS DISEASE================  No signs of infection. Monitor for fever / leukocytosis.        Pertinent clinical, laboratory, radiographic, hemodynamic, echocardiographic, respiratory data, microbiologic data and chart were reviewed and analyzed frequently throughout the course of the day and night. GI and DVT prophylaxis, glycemic control, head of bed elevation and skin care issues were addressed.  Patient seen, examined and plan discussed with CT Surgery / CTICU team during rounds.      Yamil Pagan DO, FACEP

## 2018-01-11 NOTE — PROGRESS NOTE ADULT - SUBJECTIVE AND OBJECTIVE BOX
Anesthesia Pain Management Service    SUBJECTIVE: Pt doing well with IV PCA without problems reported.    Therapy:	  [ X] IV PCA	   [ ] Epidural           [ ] s/p Spinal Opoid              [ ] Postpartum infusion	  [ ] Patient controlled regional anesthesia (PCRA)    [ ] prn Analgesics    Allergies    No Known Allergies    Intolerances      MEDICATIONS  (STANDING):  ALBUTerol/ipratropium for Nebulization 3 milliLiter(s) Nebulizer every 6 hours  ATENolol  Tablet 25 milliGRAM(s) Oral daily  dextrose 5%. 1000 milliLiter(s) (50 mL/Hr) IV Continuous <Continuous>  dextrose 50% Injectable 12.5 Gram(s) IV Push once  dextrose 50% Injectable 25 Gram(s) IV Push once  dextrose 50% Injectable 25 Gram(s) IV Push once  docusate sodium 100 milliGRAM(s) Oral three times a day  heparin  Injectable 5000 Unit(s) SubCutaneous every 8 hours  HYDROmorphone PCA (1 mG/mL) 30 milliLiter(s) PCA Continuous PCA Continuous  insulin lispro (HumaLOG) corrective regimen sliding scale   SubCutaneous three times a day before meals  lactated ringers. 1000 milliLiter(s) (30 mL/Hr) IV Continuous <Continuous>  levothyroxine 75 MICROGram(s) Oral daily  pantoprazole    Tablet 40 milliGRAM(s) Oral before breakfast  senna 2 Tablet(s) Oral at bedtime  simvastatin 20 milliGRAM(s) Oral at bedtime  trimethoprim  160 mG/sulfamethoxazole 800 mG 1 Tablet(s) Oral two times a day    MEDICATIONS  (PRN):  acetaminophen  IVPB. 1000 milliGRAM(s) IV Intermittent once PRN Moderate Pain (4 - 6)  dextrose Gel 1 Dose(s) Oral once PRN Blood Glucose LESS THAN 70 milliGRAM(s)/deciliter  diphenhydrAMINE   Injectable 25 milliGRAM(s) IV Push every 4 hours PRN Pruritus  glucagon  Injectable 1 milliGRAM(s) IntraMuscular once PRN Glucose LESS THAN 70 milligrams/deciliter  HYDROmorphone PCA (1 mG/mL) Rescue Clinician Bolus 0.5 milliGRAM(s) IV Push every 15 minutes PRN for Pain Scale GREATER THAN 6  naloxone Injectable 0.1 milliGRAM(s) IV Push every 3 minutes PRN For ANY of the following changes in patient status:  A. RR LESS THAN 10 breaths per minute, B. Oxygen saturation LESS THAN 90%, C. Sedation score of 6  ondansetron Injectable 4 milliGRAM(s) IV Push every 6 hours PRN Nausea      OBJECTIVE:   [X] No new signs     [ ] Other:    Side Effects:  [X ] None			[ ] Other:    Assessment of Catheter Site:		[ ] Intact		[ ] Other:    ASSESSMENT/PLAN  [ X] Continue current therapy    [ ] Therapy changed to:    [ ] IV PCA       [ ] Epidural     [ ] prn Analgesics     Comments:    Progress Note written now but Patient was seen earlier.

## 2018-01-11 NOTE — PROGRESS NOTE ADULT - SUBJECTIVE AND OBJECTIVE BOX
POST ANESTHESIA EVALUATION    77y Female POSTOP DAY 1 S/P     MENTAL STATUS: Patient participation [ X ] Awake     [  ] Arousable     [  ] Sedated    AIRWAY PATENCY: [X  ] Satisfactory  [  ] Other:     Vital Signs Last 24 Hrs  T(C): 37.3 (11 Jan 2018 12:00), Max: 37.3 (11 Jan 2018 12:00)  T(F): 99.2 (11 Jan 2018 12:00), Max: 99.2 (11 Jan 2018 12:00)  HR: 103 (11 Jan 2018 13:00) (82 - 103)  BP: 93/54 (11 Jan 2018 13:00) (93/54 - 125/67)  BP(mean): 60 (11 Jan 2018 13:00) (53 - 79)  RR: 19 (11 Jan 2018 13:00) (10 - 24)  SpO2: 94% (11 Jan 2018 13:00) (93% - 100%)  I&O's Summary    10 Jeremiah 2018 07:01  -  11 Jan 2018 07:00  --------------------------------------------------------  IN: 630 mL / OUT: 796 mL / NET: -166 mL    11 Jan 2018 07:01  -  11 Jan 2018 14:13  --------------------------------------------------------  IN: 690 mL / OUT: 340 mL / NET: 350 mL          NAUSEA/ VOMITTING:  [ X ] NONE  [  ] CONTROLLED [  ] OTHER     PAIN: [ X ] CONTROLLED WITH CURRENT REGIMEN  [  ] OTHER    [ X ] NO APPARENT ANESTHESIA COMPLICATIONS      Comments:

## 2018-01-11 NOTE — PROGRESS NOTE ADULT - SUBJECTIVE AND OBJECTIVE BOX
Day _2_ of Anesthesia Pain Management Service    Allergies  No Known Allergies    SUBJECTIVE: "When I cough, my side with the tube hurts. The medicine helps."    Pain Scale Score	At rest: _3-4/10_ 	With Activity: ___ 	[ ] Refer to charted pain scores    THERAPY:    [ ] IV PCA Morphine		[ ] 5 mg/mL	[ ] 1 mg/mL  [X] IV PCA Hydromorphone	[ ] 5 mg/mL	[X] 1 mg/mL  [ ] IV PCA Fentanyl		[ ] 50 micrograms/mL    Demand dose _0.2mg_ lockout _6_ (minutes) Continuous Rate _0_ Total: _3.8mg_  Daily      MEDICATIONS  (STANDING):  ALBUTerol/ipratropium for Nebulization 3 milliLiter(s) Nebulizer every 6 hours  ATENolol  Tablet 25 milliGRAM(s) Oral daily  dextrose 5%. 1000 milliLiter(s) (50 mL/Hr) IV Continuous <Continuous>  dextrose 50% Injectable 12.5 Gram(s) IV Push once  dextrose 50% Injectable 25 Gram(s) IV Push once  dextrose 50% Injectable 25 Gram(s) IV Push once  docusate sodium 100 milliGRAM(s) Oral three times a day  heparin  Injectable 5000 Unit(s) SubCutaneous every 8 hours  HYDROmorphone PCA (1 mG/mL) 30 milliLiter(s) PCA Continuous PCA Continuous  insulin lispro (HumaLOG) corrective regimen sliding scale   SubCutaneous three times a day before meals  lactated ringers. 1000 milliLiter(s) (30 mL/Hr) IV Continuous <Continuous>  levothyroxine 75 MICROGram(s) Oral daily  pantoprazole    Tablet 40 milliGRAM(s) Oral before breakfast  senna 2 Tablet(s) Oral at bedtime  simvastatin 20 milliGRAM(s) Oral at bedtime  trimethoprim  160 mG/sulfamethoxazole 800 mG 1 Tablet(s) Oral two times a day    MEDICATIONS  (PRN):  acetaminophen  IVPB. 1000 milliGRAM(s) IV Intermittent once PRN Moderate Pain (4 - 6)  dextrose Gel 1 Dose(s) Oral once PRN Blood Glucose LESS THAN 70 milliGRAM(s)/deciliter  diphenhydrAMINE   Injectable 25 milliGRAM(s) IV Push every 4 hours PRN Pruritus  glucagon  Injectable 1 milliGRAM(s) IntraMuscular once PRN Glucose LESS THAN 70 milligrams/deciliter  HYDROmorphone PCA (1 mG/mL) Rescue Clinician Bolus 0.5 milliGRAM(s) IV Push every 15 minutes PRN for Pain Scale GREATER THAN 6  naloxone Injectable 0.1 milliGRAM(s) IV Push every 3 minutes PRN For ANY of the following changes in patient status:  A. RR LESS THAN 10 breaths per minute, B. Oxygen saturation LESS THAN 90%, C. Sedation score of 6  ondansetron Injectable 4 milliGRAM(s) IV Push every 6 hours PRN Nausea      OBJECTIVE: A&Ox3, NAD, sitting up in chair    Sedation Score:	[X] Alert	[ ] Drowsy	[ ] Arousable	[ ] Asleep	[ ] Unresponsive    Side Effects:	[X] None	[ ] Nausea	[ ] Vomiting	[ ] Pruritus  		  [ ] Weakness		[ ] Numbness	[ ] Other:                            12.3   7.97  )-----------( 146      ( 11 Jan 2018 04:40 )             37.8       01-11    139  |  102  |  15  ----------------------------<  153<H>  4.7   |  24  |  0.95    Ca    8.3<L>      11 Jan 2018 04:40        ASSESSMENT/ PLAN    Therapy to  be:	[X] Continue   [ ] Discontinued   [ ] Change to prn Analgesics    Documentation and Verification of current medications:  [X] Done	[ ] Not done, not eligible  [ ] Not done, reason not given    Comments:  +chest tube  Continue Tylenol q6hrs x 2 days, after last Ofirmev dose administered

## 2018-01-12 ENCOUNTER — TRANSCRIPTION ENCOUNTER (OUTPATIENT)
Age: 78
End: 2018-01-12

## 2018-01-12 LAB
BUN SERPL-MCNC: 13 MG/DL — SIGNIFICANT CHANGE UP (ref 7–23)
CALCIUM SERPL-MCNC: 8.2 MG/DL — LOW (ref 8.4–10.5)
CHLORIDE SERPL-SCNC: 98 MMOL/L — SIGNIFICANT CHANGE UP (ref 98–107)
CO2 SERPL-SCNC: 23 MMOL/L — SIGNIFICANT CHANGE UP (ref 22–31)
CREAT SERPL-MCNC: 1.13 MG/DL — SIGNIFICANT CHANGE UP (ref 0.5–1.3)
GLUCOSE BLDC GLUCOMTR-MCNC: 128 MG/DL — HIGH (ref 70–99)
GLUCOSE BLDC GLUCOMTR-MCNC: 134 MG/DL — HIGH (ref 70–99)
GLUCOSE BLDC GLUCOMTR-MCNC: 157 MG/DL — HIGH (ref 70–99)
GLUCOSE BLDC GLUCOMTR-MCNC: 158 MG/DL — HIGH (ref 70–99)
GLUCOSE SERPL-MCNC: 154 MG/DL — HIGH (ref 70–99)
POTASSIUM SERPL-MCNC: 4.7 MMOL/L — SIGNIFICANT CHANGE UP (ref 3.5–5.3)
POTASSIUM SERPL-SCNC: 4.7 MMOL/L — SIGNIFICANT CHANGE UP (ref 3.5–5.3)
SODIUM SERPL-SCNC: 134 MMOL/L — LOW (ref 135–145)
TSH SERPL-MCNC: 0.43 UIU/ML — SIGNIFICANT CHANGE UP (ref 0.27–4.2)

## 2018-01-12 PROCEDURE — 71045 X-RAY EXAM CHEST 1 VIEW: CPT | Mod: 26

## 2018-01-12 RX ORDER — AMIODARONE HYDROCHLORIDE 400 MG/1
150 TABLET ORAL ONCE
Qty: 0 | Refills: 0 | Status: COMPLETED | OUTPATIENT
Start: 2018-01-12 | End: 2018-01-12

## 2018-01-12 RX ORDER — ACETAMINOPHEN 500 MG
325 TABLET ORAL EVERY 4 HOURS
Qty: 0 | Refills: 0 | Status: DISCONTINUED | OUTPATIENT
Start: 2018-01-12 | End: 2018-01-12

## 2018-01-12 RX ORDER — OXYCODONE AND ACETAMINOPHEN 5; 325 MG/1; MG/1
1 TABLET ORAL EVERY 4 HOURS
Qty: 0 | Refills: 0 | Status: DISCONTINUED | OUTPATIENT
Start: 2018-01-12 | End: 2018-01-14

## 2018-01-12 RX ORDER — ACETAMINOPHEN 500 MG
325 TABLET ORAL EVERY 4 HOURS
Qty: 0 | Refills: 0 | Status: DISCONTINUED | OUTPATIENT
Start: 2018-01-12 | End: 2018-01-14

## 2018-01-12 RX ORDER — MAGNESIUM SULFATE 500 MG/ML
1 VIAL (ML) INJECTION ONCE
Qty: 0 | Refills: 0 | Status: COMPLETED | OUTPATIENT
Start: 2018-01-12 | End: 2018-01-12

## 2018-01-12 RX ORDER — METOPROLOL TARTRATE 50 MG
25 TABLET ORAL
Qty: 0 | Refills: 0 | Status: DISCONTINUED | OUTPATIENT
Start: 2018-01-12 | End: 2018-01-14

## 2018-01-12 RX ADMIN — LIDOCAINE 1 PATCH: 4 CREAM TOPICAL at 13:27

## 2018-01-12 RX ADMIN — Medication 100 MILLIGRAM(S): at 13:28

## 2018-01-12 RX ADMIN — HEPARIN SODIUM 5000 UNIT(S): 5000 INJECTION INTRAVENOUS; SUBCUTANEOUS at 05:30

## 2018-01-12 RX ADMIN — Medication 75 MICROGRAM(S): at 05:29

## 2018-01-12 RX ADMIN — OXYCODONE AND ACETAMINOPHEN 1 TABLET(S): 5; 325 TABLET ORAL at 08:00

## 2018-01-12 RX ADMIN — Medication 1 TABLET(S): at 17:52

## 2018-01-12 RX ADMIN — AMIODARONE HYDROCHLORIDE 618 MILLIGRAM(S): 400 TABLET ORAL at 01:38

## 2018-01-12 RX ADMIN — HEPARIN SODIUM 5000 UNIT(S): 5000 INJECTION INTRAVENOUS; SUBCUTANEOUS at 13:28

## 2018-01-12 RX ADMIN — Medication 25 MILLIGRAM(S): at 05:29

## 2018-01-12 RX ADMIN — Medication 1: at 17:52

## 2018-01-12 RX ADMIN — Medication 1 TABLET(S): at 05:30

## 2018-01-12 RX ADMIN — PANTOPRAZOLE SODIUM 40 MILLIGRAM(S): 20 TABLET, DELAYED RELEASE ORAL at 05:28

## 2018-01-12 RX ADMIN — Medication 100 MILLIGRAM(S): at 21:31

## 2018-01-12 RX ADMIN — OXYCODONE AND ACETAMINOPHEN 1 TABLET(S): 5; 325 TABLET ORAL at 07:43

## 2018-01-12 RX ADMIN — OXYCODONE AND ACETAMINOPHEN 1 TABLET(S): 5; 325 TABLET ORAL at 11:53

## 2018-01-12 RX ADMIN — Medication 1: at 11:52

## 2018-01-12 RX ADMIN — Medication 25 MILLIGRAM(S): at 17:52

## 2018-01-12 RX ADMIN — HEPARIN SODIUM 5000 UNIT(S): 5000 INJECTION INTRAVENOUS; SUBCUTANEOUS at 21:30

## 2018-01-12 RX ADMIN — Medication 100 MILLIGRAM(S): at 05:29

## 2018-01-12 RX ADMIN — SENNA PLUS 2 TABLET(S): 8.6 TABLET ORAL at 21:31

## 2018-01-12 RX ADMIN — OXYCODONE AND ACETAMINOPHEN 1 TABLET(S): 5; 325 TABLET ORAL at 12:00

## 2018-01-12 RX ADMIN — Medication 100 GRAM(S): at 00:16

## 2018-01-12 RX ADMIN — SIMVASTATIN 20 MILLIGRAM(S): 20 TABLET, FILM COATED ORAL at 21:31

## 2018-01-12 NOTE — DISCHARGE NOTE ADULT - NS AS ACTIVITY OBS
Walking-Indoors allowed/Stairs allowed/Do not make important decisions/Do not drive or operate machinery/Walking-Outdoors allowed/Showering allowed/No Heavy lifting/straining

## 2018-01-12 NOTE — DISCHARGE NOTE ADULT - PLAN OF CARE
s/p Right upper lobectomy, Right middle lobe wedge resection- continued wound healing Follow up with Dr. Corbin in 2 weeks   Chest x-ray prior to appointment with Dr. Corbin.  Please bring copy of chest x-ray film to appointment with Dr. Corbin   Follow up with primary care provider in one week   Use incentive spirometer  Pain management   Increase ambulation as tolerated Control heart rate and rhythm Follow up with your Cardiologist Dr. Richard Velasquez  [phone] in 1-2 weeks in regards to heart medication (lopressor and cardizem) that were started while in the hospital.  Follow up with your PCP within 1-2 weeks after discharge.

## 2018-01-12 NOTE — DISCHARGE NOTE ADULT - PATIENT PORTAL LINK FT
“You can access the Follow[facility] Patient Portal, offered by [facility], by registering with the following website: [email or web address]”

## 2018-01-12 NOTE — DISCHARGE NOTE ADULT - HOSPITAL COURSE
Patient presents with persistent cough, unsuccessful treatment with antibiotics. Subsequent CT scan and PET scan revealed b/l lung nodules but only the right lung nodule had uptake with the PET scan.   Patient s/p right vats, right upper lobectomy and right middle lobe wedge resection on 1/10/18.  Post op course patient episode of rapid a fib, back to sinus rhythm with beta blocker and calcium channel blocker.   Chest tube remained for high output, patient stable for discharge when output decreases and chest tube removed.

## 2018-01-12 NOTE — DISCHARGE NOTE ADULT - CARE PLAN
Principal Discharge DX:	Lung mass  Goal:	s/p Right upper lobectomy, Right middle lobe wedge resection- continued wound healing  Instructions for follow-up, activity and diet:	Follow up with Dr. Corbin in 2 weeks   Chest x-ray prior to appointment with Dr. Corbin.  Please bring copy of chest x-ray film to appointment with Dr. Corbin   Follow up with primary care provider in one week   Use incentive spirometer  Pain management   Increase ambulation as tolerated Principal Discharge DX:	Lung mass  Goal:	s/p Right upper lobectomy, Right middle lobe wedge resection- continued wound healing  Instructions for follow-up, activity and diet:	Follow up with Dr. Corbin in 2 weeks   Chest x-ray prior to appointment with Dr. Corbin.  Please bring copy of chest x-ray film to appointment with Dr. Corbin   Follow up with primary care provider in one week   Use incentive spirometer  Pain management   Increase ambulation as tolerated  Secondary Diagnosis:	Atrial fibrillation  Goal:	Control heart rate and rhythm  Instructions for follow-up, activity and diet:	Follow up with your Cardiologist Dr. Richard Velasquez  [phone] in 1-2 weeks in regards to heart medication (lopressor and cardizem) that were started while in the hospital.  Follow up with your PCP within 1-2 weeks after discharge.

## 2018-01-12 NOTE — DISCHARGE NOTE ADULT - MEDICATION SUMMARY - MEDICATIONS TO STOP TAKING
I will STOP taking the medications listed below when I get home from the hospital:    atenolol 50 mg oral tablet  -- 1 tab(s) by mouth once a day. AM    glucosamine  -- 1 tab(s) by mouth once a day. Last dose 1/4/18    Therapeutic Multiple Vitamins oral tablet  -- 1 tab(s) by mouth once a day. LAst dose 1/4/18

## 2018-01-12 NOTE — PROGRESS NOTE ADULT - SUBJECTIVE AND OBJECTIVE BOX
Anesthesia Pain Management Service    SUBJECTIVE: Patient is doing well with IV PCA and no significant problems reported.    Pain Scale Score	At rest: _1__ 	With Activity: ___ 	[X ] Refer to charted pain scores    THERAPY:    [ ] IV PCA Morphine		[ ] 5 mg/mL	[ ] 1 mg/mL  [X ] IV PCA Hydromorphone	[ ] 5 mg/mL	[X ] 1 mg/mL  [ ] IV PCA Fentanyl		[ ] 50 micrograms/mL    Demand dose __0.2_ lockout __6_ (minutes) Continuous Rate _0__ Total: __1.8_   mg used (in past 24 hrs)      MEDICATIONS  (STANDING):  dextrose 5%. 1000 milliLiter(s) (50 mL/Hr) IV Continuous <Continuous>  dextrose 50% Injectable 12.5 Gram(s) IV Push once  dextrose 50% Injectable 25 Gram(s) IV Push once  dextrose 50% Injectable 25 Gram(s) IV Push once  diltiazem    Tablet 60 milliGRAM(s) Oral three times a day  docusate sodium 100 milliGRAM(s) Oral three times a day  heparin  Injectable 5000 Unit(s) SubCutaneous every 8 hours  insulin lispro (HumaLOG) corrective regimen sliding scale   SubCutaneous three times a day before meals  lactated ringers. 1000 milliLiter(s) (30 mL/Hr) IV Continuous <Continuous>  levothyroxine 75 MICROGram(s) Oral daily  lidocaine   Patch 1 Patch Transdermal daily  metoprolol     tartrate 25 milliGRAM(s) Oral two times a day  pantoprazole    Tablet 40 milliGRAM(s) Oral before breakfast  senna 2 Tablet(s) Oral at bedtime  simvastatin 20 milliGRAM(s) Oral at bedtime  trimethoprim  160 mG/sulfamethoxazole 800 mG 1 Tablet(s) Oral two times a day    MEDICATIONS  (PRN):  acetaminophen   Tablet. 325 milliGRAM(s) Oral every 4 hours PRN Mild Pain (1 - 3)  acetaminophen  IVPB. 1000 milliGRAM(s) IV Intermittent once PRN Moderate Pain (4 - 6)  dextrose Gel 1 Dose(s) Oral once PRN Blood Glucose LESS THAN 70 milliGRAM(s)/deciliter  glucagon  Injectable 1 milliGRAM(s) IntraMuscular once PRN Glucose LESS THAN 70 milligrams/deciliter  oxyCODONE    5 mG/acetaminophen 325 mG 1 Tablet(s) Oral every 4 hours PRN Moderate Pain (4 - 6)  zolpidem 5 milliGRAM(s) Oral at bedtime PRN Insomnia      OBJECTIVE: pt. laying in bed     Sedation Score:	[ X] Alert	[ ] Drowsy 	[ ] Arousable	[ ] Asleep	[ ] Unresponsive    Side Effects:	[X ] None	[ ] Nausea	[ ] Vomiting	[ ] Pruritus  		[ ] Other:    Vital Signs Last 24 Hrs  T(C): 37.1 (12 Jan 2018 05:26), Max: 37.8 (11 Jan 2018 16:00)  T(F): 98.7 (12 Jan 2018 05:26), Max: 100.1 (11 Jan 2018 16:00)  HR: 85 (12 Jan 2018 07:26) (85 - 122)  BP: 114/57 (12 Jan 2018 07:26) (93/54 - 134/67)  BP(mean): 70 (12 Jan 2018 07:26) (60 - 77)  RR: 20 (12 Jan 2018 07:26) (14 - 21)  SpO2: 98% (12 Jan 2018 07:26) (92% - 100%)    ASSESSMENT/ PLAN    Therapy to  be:	[ ] Continue   [ X] Discontinued   [X ] Change to prn Analgesics    Documentation and Verification of current medications:   [X] Done	[ ] Not done, not elligible    Comments: PRN Oral/IV opioids and/or Adjuvant medication to be ordered at this point.  Orders D/C by team.

## 2018-01-12 NOTE — PROGRESS NOTE ADULT - SUBJECTIVE AND OBJECTIVE BOX
Subjective: no acute complaints, pt had episode of rapid afib last night, Hr better controlled now, back in SR    Vital Signs:  Vital Signs Last 24 Hrs  T(C): 36.7 (01-12-18 @ 11:25), Max: 37.8 (01-11-18 @ 16:00)  T(F): 98.1 (01-12-18 @ 11:25), Max: 100.1 (01-11-18 @ 16:00)  HR: 71 (01-12-18 @ 11:25) (71 - 122)  BP: 98/55 (01-12-18 @ 11:25) (98/55 - 134/67)  RR: 18 (01-12-18 @ 11:25) (16 - 20)  SpO2: 94% (01-12-18 @ 11:25) (92% - 100%) on (O2)    Telemetry/Alarms:  General: WN/WD NAD  Neurology: Awake, nonfocal, WATSON x 4  Eyes: Scleras clear, PERRLA/ EOMI, Gross vision intact  ENT:Gross hearing intact, grossly patent pharynx, no stridor  Neck: Neck supple, trachea midline, No JVD,   Respiratory: CTA B/L, No wheezing, rales, rhonchi  CV: RRR, S1S2, no murmurs, rubs or gallops  Abdominal: Soft, NT, ND +BS,   Extremities: No edema, + peripheral pulses  Skin: No Rashes, Hematoma, Ecchymosis  Lymphatic: No Neck, axilla, groin LAD  Psych: Oriented x 3, normal affect  Incisions: c,d,i  Tubes: right chest tube drained 225cc/24h was on suction and placed to waterseal this AM.    Relevant labs, radiology and Medications reviewed                        12.3   7.97  )-----------( 146      ( 11 Jan 2018 04:40 )             37.8     01-12    134<L>  |  98  |  13  ----------------------------<  154<H>  4.7   |  23  |  1.13    Ca    8.2<L>      12 Jan 2018 06:30        MEDICATIONS  (STANDING):  dextrose 5%. 1000 milliLiter(s) (50 mL/Hr) IV Continuous <Continuous>  dextrose 50% Injectable 12.5 Gram(s) IV Push once  dextrose 50% Injectable 25 Gram(s) IV Push once  dextrose 50% Injectable 25 Gram(s) IV Push once  diltiazem    Tablet 60 milliGRAM(s) Oral three times a day  docusate sodium 100 milliGRAM(s) Oral three times a day  heparin  Injectable 5000 Unit(s) SubCutaneous every 8 hours  insulin lispro (HumaLOG) corrective regimen sliding scale   SubCutaneous three times a day before meals  lactated ringers. 1000 milliLiter(s) (30 mL/Hr) IV Continuous <Continuous>  levothyroxine 75 MICROGram(s) Oral daily  lidocaine   Patch 1 Patch Transdermal daily  metoprolol     tartrate 25 milliGRAM(s) Oral two times a day  pantoprazole    Tablet 40 milliGRAM(s) Oral before breakfast  senna 2 Tablet(s) Oral at bedtime  simvastatin 20 milliGRAM(s) Oral at bedtime  trimethoprim  160 mG/sulfamethoxazole 800 mG 1 Tablet(s) Oral two times a day    MEDICATIONS  (PRN):  acetaminophen   Tablet. 325 milliGRAM(s) Oral every 4 hours PRN Mild Pain (1 - 3)  acetaminophen  IVPB. 1000 milliGRAM(s) IV Intermittent once PRN Moderate Pain (4 - 6)  dextrose Gel 1 Dose(s) Oral once PRN Blood Glucose LESS THAN 70 milliGRAM(s)/deciliter  glucagon  Injectable 1 milliGRAM(s) IntraMuscular once PRN Glucose LESS THAN 70 milligrams/deciliter  oxyCODONE    5 mG/acetaminophen 325 mG 1 Tablet(s) Oral every 4 hours PRN Moderate Pain (4 - 6)  zolpidem 5 milliGRAM(s) Oral at bedtime PRN Insomnia    Pertinent Physical Exam  I&O's Summary    11 Jan 2018 07:01  -  12 Jan 2018 07:00  --------------------------------------------------------  IN: 1200 mL / OUT: 1025 mL / NET: 175 mL    12 Jan 2018 07:01  -  12 Jan 2018 15:09  --------------------------------------------------------  IN: 0 mL / OUT: 380 mL / NET: -380 mL        Assessment  77y Female  w/ PAST MEDICAL & SURGICAL HISTORY:  Vitamin D deficiency  Hypertension  Arthritis  Sciatica: injection done in Dec. 2017  Insomnia  Seasonal allergies  Nephrolithiasis: 9 mm kidney stone -- to start antibiotic today (1-4-18) and have placement of a stent by urologist before right VATS on 1-10-18  Snoring: BETITO precautions -- responds affirmatively to STOP BANG questionnaire -- admits to loud snoring; age &gt; 50; h/o htn  Lung mass: b/l lung masses; right side has uptake on PET scan  Lung mass: right lung in Jan. 2018  Obesity  Hypercholesterolemia  Diabetes mellitus: type II, diagnosed approximately 2008  Hypothyroid  Language barrier: native language Mandarin, Chinese; comprehends and verbalizes no Englsih  S/P appendectomy: greater than 30 years ago  Bleeding: hysterectomy 1985  admitted with complaints of Patient is a 77y old  Female who presents with a chief complaint of as per the daughter Luba, "they are operating on the right lung." (04 Jan 2018 12:51)  .  On (1/12/18), patient underwent VATS (video-assisted thoracoscopic surgery) RUL lobectomy, RML wedge  . Postoperative course/issues: chest tube remains for increased output and pt had episode of rapid afib last night - managed with cardizem, amio and magnesium, pt is back on beta blocker    PLAN  Neuro: Pain management  Pulm: Encourage coughing, deep breathing and use of incentive spirometry. Wean off supplemental oxygen as able. Daily CXR.   Cardio: Monitor telemetry/alarms, back in SR on beta blocker and cardizem  GI: Tolerating diet. Continue stool softeners.  Renal: monitor urine output, supplement electrolytes as needed  Vasc: Heparin SC/SCDs for DVT prophylaxis  Heme: Stable H/H. .   ID: bactrim for h/o nephrolithiasis   Therapy: OOB/ambulate  Tubes: Monitor Chest tube output  Disposition: Aim to D/C to home once chest tube removed, possibly tomorrow  Discussed with Cardiothoracic Team at AM rounds.

## 2018-01-12 NOTE — PROGRESS NOTE ADULT - SUBJECTIVE AND OBJECTIVE BOX
Anesthesia Pain Management Service    SUBJECTIVE: Pt now off IV PCA without problems reported.    Therapy:	  [ X] IV PCA	   [ ] Epidural           [ ] s/p Spinal Opoid              [ ] Postpartum infusion	  [ ] Patient controlled regional anesthesia (PCRA)    [ ] prn Analgesics    Allergies    No Known Allergies    Intolerances      MEDICATIONS  (STANDING):  dextrose 5%. 1000 milliLiter(s) (50 mL/Hr) IV Continuous <Continuous>  dextrose 50% Injectable 12.5 Gram(s) IV Push once  dextrose 50% Injectable 25 Gram(s) IV Push once  dextrose 50% Injectable 25 Gram(s) IV Push once  diltiazem    Tablet 60 milliGRAM(s) Oral three times a day  docusate sodium 100 milliGRAM(s) Oral three times a day  heparin  Injectable 5000 Unit(s) SubCutaneous every 8 hours  insulin lispro (HumaLOG) corrective regimen sliding scale   SubCutaneous three times a day before meals  levothyroxine 75 MICROGram(s) Oral daily  lidocaine   Patch 1 Patch Transdermal daily  metoprolol     tartrate 25 milliGRAM(s) Oral two times a day  pantoprazole    Tablet 40 milliGRAM(s) Oral before breakfast  senna 2 Tablet(s) Oral at bedtime  simvastatin 20 milliGRAM(s) Oral at bedtime  trimethoprim  160 mG/sulfamethoxazole 800 mG 1 Tablet(s) Oral two times a day    MEDICATIONS  (PRN):  acetaminophen   Tablet. 325 milliGRAM(s) Oral every 4 hours PRN Mild Pain (1 - 3)  acetaminophen  IVPB. 1000 milliGRAM(s) IV Intermittent once PRN Moderate Pain (4 - 6)  dextrose Gel 1 Dose(s) Oral once PRN Blood Glucose LESS THAN 70 milliGRAM(s)/deciliter  glucagon  Injectable 1 milliGRAM(s) IntraMuscular once PRN Glucose LESS THAN 70 milligrams/deciliter  oxyCODONE    5 mG/acetaminophen 325 mG 1 Tablet(s) Oral every 4 hours PRN Moderate Pain (4 - 6)  zolpidem 5 milliGRAM(s) Oral at bedtime PRN Insomnia      OBJECTIVE:   [X] No new signs     [ ] Other:    Side Effects:  [X ] None			[ ] Other:    Assessment of Catheter Site:		[ ] Intact		[ ] Other:    ASSESSMENT/PLAN  [ ] Continue current therapy    [X ] Therapy changed to:    [ ] IV PCA       [ ] Epidural     [ X] prn Analgesics     Comments: Opioids or Adjuvent analgesics to be used at this point.    Progress Note written now but Patient was seen earlier.

## 2018-01-12 NOTE — DISCHARGE NOTE ADULT - CARE PROVIDER_API CALL
Javad Corbin), Surgery; Thoracic Surgery  [address] [place] [address]  Phone: [phone]  Fax: [phone]

## 2018-01-12 NOTE — DISCHARGE NOTE ADULT - CONDITIONS AT DISCHARGE
Pt is alert and orientedx4. Denies pain at this time. Rt CT site is c/d/i. IV removed. Tele box removed. D/c instructions given to pt and family.

## 2018-01-13 LAB
BUN SERPL-MCNC: 12 MG/DL — SIGNIFICANT CHANGE UP (ref 7–23)
CALCIUM SERPL-MCNC: 8.6 MG/DL — SIGNIFICANT CHANGE UP (ref 8.4–10.5)
CHLORIDE SERPL-SCNC: 101 MMOL/L — SIGNIFICANT CHANGE UP (ref 98–107)
CO2 SERPL-SCNC: 23 MMOL/L — SIGNIFICANT CHANGE UP (ref 22–31)
CREAT SERPL-MCNC: 0.95 MG/DL — SIGNIFICANT CHANGE UP (ref 0.5–1.3)
GLUCOSE BLDC GLUCOMTR-MCNC: 124 MG/DL — HIGH (ref 70–99)
GLUCOSE BLDC GLUCOMTR-MCNC: 146 MG/DL — HIGH (ref 70–99)
GLUCOSE BLDC GLUCOMTR-MCNC: 146 MG/DL — HIGH (ref 70–99)
GLUCOSE BLDC GLUCOMTR-MCNC: 149 MG/DL — HIGH (ref 70–99)
GLUCOSE SERPL-MCNC: 129 MG/DL — HIGH (ref 70–99)
HCT VFR BLD CALC: 33.6 % — LOW (ref 34.5–45)
HGB BLD-MCNC: 11.5 G/DL — SIGNIFICANT CHANGE UP (ref 11.5–15.5)
MCHC RBC-ENTMCNC: 32.9 PG — SIGNIFICANT CHANGE UP (ref 27–34)
MCHC RBC-ENTMCNC: 34.2 % — SIGNIFICANT CHANGE UP (ref 32–36)
MCV RBC AUTO: 96 FL — SIGNIFICANT CHANGE UP (ref 80–100)
NRBC # FLD: 0 — SIGNIFICANT CHANGE UP
PLATELET # BLD AUTO: 170 K/UL — SIGNIFICANT CHANGE UP (ref 150–400)
PMV BLD: 10 FL — SIGNIFICANT CHANGE UP (ref 7–13)
POTASSIUM SERPL-MCNC: 4.6 MMOL/L — SIGNIFICANT CHANGE UP (ref 3.5–5.3)
POTASSIUM SERPL-SCNC: 4.6 MMOL/L — SIGNIFICANT CHANGE UP (ref 3.5–5.3)
RBC # BLD: 3.5 M/UL — LOW (ref 3.8–5.2)
RBC # FLD: 13.2 % — SIGNIFICANT CHANGE UP (ref 10.3–14.5)
SODIUM SERPL-SCNC: 136 MMOL/L — SIGNIFICANT CHANGE UP (ref 135–145)
WBC # BLD: 7.35 K/UL — SIGNIFICANT CHANGE UP (ref 3.8–10.5)
WBC # FLD AUTO: 7.35 K/UL — SIGNIFICANT CHANGE UP (ref 3.8–10.5)

## 2018-01-13 PROCEDURE — 71045 X-RAY EXAM CHEST 1 VIEW: CPT | Mod: 26

## 2018-01-13 RX ADMIN — LIDOCAINE 1 PATCH: 4 CREAM TOPICAL at 14:08

## 2018-01-13 RX ADMIN — Medication 75 MICROGRAM(S): at 05:41

## 2018-01-13 RX ADMIN — Medication 100 MILLIGRAM(S): at 05:41

## 2018-01-13 RX ADMIN — SENNA PLUS 2 TABLET(S): 8.6 TABLET ORAL at 22:14

## 2018-01-13 RX ADMIN — HEPARIN SODIUM 5000 UNIT(S): 5000 INJECTION INTRAVENOUS; SUBCUTANEOUS at 22:14

## 2018-01-13 RX ADMIN — Medication 25 MILLIGRAM(S): at 05:41

## 2018-01-13 RX ADMIN — SIMVASTATIN 20 MILLIGRAM(S): 20 TABLET, FILM COATED ORAL at 22:13

## 2018-01-13 RX ADMIN — ZOLPIDEM TARTRATE 5 MILLIGRAM(S): 10 TABLET ORAL at 01:18

## 2018-01-13 RX ADMIN — OXYCODONE AND ACETAMINOPHEN 1 TABLET(S): 5; 325 TABLET ORAL at 01:50

## 2018-01-13 RX ADMIN — OXYCODONE AND ACETAMINOPHEN 1 TABLET(S): 5; 325 TABLET ORAL at 06:45

## 2018-01-13 RX ADMIN — HEPARIN SODIUM 5000 UNIT(S): 5000 INJECTION INTRAVENOUS; SUBCUTANEOUS at 05:40

## 2018-01-13 RX ADMIN — LIDOCAINE 1 PATCH: 4 CREAM TOPICAL at 01:20

## 2018-01-13 RX ADMIN — Medication 100 MILLIGRAM(S): at 14:07

## 2018-01-13 RX ADMIN — Medication 100 MILLIGRAM(S): at 22:13

## 2018-01-13 RX ADMIN — PANTOPRAZOLE SODIUM 40 MILLIGRAM(S): 20 TABLET, DELAYED RELEASE ORAL at 05:41

## 2018-01-13 RX ADMIN — Medication 1 TABLET(S): at 17:47

## 2018-01-13 RX ADMIN — OXYCODONE AND ACETAMINOPHEN 1 TABLET(S): 5; 325 TABLET ORAL at 05:50

## 2018-01-13 RX ADMIN — Medication 1 TABLET(S): at 05:43

## 2018-01-13 RX ADMIN — OXYCODONE AND ACETAMINOPHEN 1 TABLET(S): 5; 325 TABLET ORAL at 02:33

## 2018-01-13 RX ADMIN — Medication 25 MILLIGRAM(S): at 17:48

## 2018-01-13 RX ADMIN — HEPARIN SODIUM 5000 UNIT(S): 5000 INJECTION INTRAVENOUS; SUBCUTANEOUS at 14:07

## 2018-01-13 NOTE — PROGRESS NOTE ADULT - SUBJECTIVE AND OBJECTIVE BOX
Subjective: "Pt feels ok per daughter" Mandrin speaking)  No events overnight    Vital Signs:  Vital Signs Last 24 Hrs  T(C): 36.4 (01-13-18 @ 10:02), Max: 37.1 (01-12-18 @ 20:51)  T(F): 97.5 (01-13-18 @ 10:02), Max: 98.7 (01-12-18 @ 20:51)  HR: 72 (01-13-18 @ 11:52) (72 - 85)  BP: 104/57 (01-13-18 @ 11:52) (104/57 - 125/57)  RR: 18 (01-13-18 @ 11:52) (18 - 18)  SpO2: 95% (01-13-18 @ 11:52) (94% - 97%) on (O2)    Telemetry/Alarms:  General: WN/WD NAD  Neurology: Awake, nonfocal, WATSON x 4  Eyes: Scleras clear, PERRLA/ EOMI, Gross vision intact  ENT:Gross hearing intact, grossly patent pharynx, no stridor  Neck: Neck supple, trachea midline, No JVD,   Respiratory: CTA B/L, No wheezing, rales, rhonchi  CV: RRR, S1S2, no murmurs, rubs or gallops  Abdominal: Soft, NT, ND +BS,   Extremities: No edema, + peripheral pulses  Skin: No Rashes, Hematoma, Ecchymosis  Lymphatic: No Neck, axilla, groin LAD  Psych: Oriented x 3, normal affect  Incisions:   Tubes: Right chest tube in place 220ml serosanguinous drainage, on water seal, no air leak  Relevant labs, radiology and Medications reviewed                        11.5   7.35  )-----------( 170      ( 13 Jan 2018 06:27 )             33.6     01-13    136  |  101  |  12  ----------------------------<  129<H>  4.6   |  23  |  0.95    Ca    8.6      13 Jan 2018 06:27        MEDICATIONS  (STANDING):  dextrose 5%. 1000 milliLiter(s) (50 mL/Hr) IV Continuous <Continuous>  dextrose 50% Injectable 12.5 Gram(s) IV Push once  dextrose 50% Injectable 25 Gram(s) IV Push once  dextrose 50% Injectable 25 Gram(s) IV Push once  diltiazem    Tablet 60 milliGRAM(s) Oral three times a day  docusate sodium 100 milliGRAM(s) Oral three times a day  heparin  Injectable 5000 Unit(s) SubCutaneous every 8 hours  insulin lispro (HumaLOG) corrective regimen sliding scale   SubCutaneous three times a day before meals  levothyroxine 75 MICROGram(s) Oral daily  lidocaine   Patch 1 Patch Transdermal daily  metoprolol     tartrate 25 milliGRAM(s) Oral two times a day  pantoprazole    Tablet 40 milliGRAM(s) Oral before breakfast  senna 2 Tablet(s) Oral at bedtime  simvastatin 20 milliGRAM(s) Oral at bedtime  trimethoprim  160 mG/sulfamethoxazole 800 mG 1 Tablet(s) Oral two times a day    MEDICATIONS  (PRN):  acetaminophen   Tablet. 325 milliGRAM(s) Oral every 4 hours PRN Mild Pain (1 - 3)  acetaminophen  IVPB. 1000 milliGRAM(s) IV Intermittent once PRN Moderate Pain (4 - 6)  dextrose Gel 1 Dose(s) Oral once PRN Blood Glucose LESS THAN 70 milliGRAM(s)/deciliter  glucagon  Injectable 1 milliGRAM(s) IntraMuscular once PRN Glucose LESS THAN 70 milligrams/deciliter  oxyCODONE    5 mG/acetaminophen 325 mG 1 Tablet(s) Oral every 4 hours PRN Moderate Pain (4 - 6)  zolpidem 5 milliGRAM(s) Oral at bedtime PRN Insomnia    Pertinent Physical Exam  I&O's Summary    12 Jan 2018 07:01  -  13 Jan 2018 07:00  --------------------------------------------------------  IN: 0 mL / OUT: 720 mL / NET: -720 mL    13 Jan 2018 07:01  -  13 Jan 2018 12:36  --------------------------------------------------------  IN: 0 mL / OUT: 350 mL / NET: -350 mL        Assessment  77y Female  w/ PAST MEDICAL & SURGICAL HISTORY:  Vitamin D deficiency  Hypertension  Arthritis  Sciatica: injection done in Dec. 2017  Insomnia  Seasonal allergies  Nephrolithiasis: 9 mm kidney stone -- to start antibiotic today (1-4-18) and have placement of a stent by urologist before right VATS on 1-10-18  Snoring: BETITO precautions -- responds affirmatively to STOP BANG questionnaire -- admits to loud snoring; age &gt; 50; h/o htn  Lung mass: b/l lung masses; right side has uptake on PET scan  Lung mass: right lung in Jan. 2018  Obesity  Hypercholesterolemia  Diabetes mellitus: type II, diagnosed approximately 2008  Hypothyroid  Language barrier: native language Mandarin, Chinese; comprehends and verbalizes no Englsih  S/P appendectomy: greater than 30 years ago  Bleeding: hysterectomy 1985  admitted with complaints of Patient is a 77y old  Female who presents with a chief complaint of as per the daughter Luba, "they are operating on the right lung." (12 Jan 2018 21:07)  .  On (Date), patient underwent VATS (video-assisted thoracoscopic surgery)  . Postoperative course/issues:    PLAN  Neuro: Pain management  Pulm: Encourage coughing, deep breathing and use of incentive spirometry. Wean off supplemental oxygen as able. Daily CXR.   Cardio: Monitor telemetry/alarms  GI: Tolerating diet. Continue stool softeners.  Renal: monitor urine output, supplement electrolytes as needed  Vasc: Heparin SC/SCDs for DVT prophylaxis  Heme: Stable H/H. .   ID: Off antibiotics. Stable.  Therapy: OOB/ambulate  Tubes: Monitor Chest tube output, Daily cxr  Disposition: Aim to D/C to home when chest tube drainage decreases.   Discussed with Dr Garcia at AM rounds.

## 2018-01-14 VITALS
RESPIRATION RATE: 18 BRPM | OXYGEN SATURATION: 98 % | TEMPERATURE: 98 F | HEART RATE: 77 BPM | SYSTOLIC BLOOD PRESSURE: 123 MMHG | DIASTOLIC BLOOD PRESSURE: 54 MMHG

## 2018-01-14 LAB
GLUCOSE BLDC GLUCOMTR-MCNC: 117 MG/DL — HIGH (ref 70–99)
GLUCOSE BLDC GLUCOMTR-MCNC: 135 MG/DL — HIGH (ref 70–99)

## 2018-01-14 PROCEDURE — 71045 X-RAY EXAM CHEST 1 VIEW: CPT | Mod: 26

## 2018-01-14 RX ORDER — DOCUSATE SODIUM 100 MG
1 CAPSULE ORAL
Qty: 21 | Refills: 0
Start: 2018-01-14 | End: 2018-01-20

## 2018-01-14 RX ORDER — ATENOLOL 25 MG/1
1 TABLET ORAL
Qty: 0 | Refills: 0 | COMMUNITY

## 2018-01-14 RX ORDER — DILTIAZEM HCL 120 MG
1 CAPSULE, EXT RELEASE 24 HR ORAL
Qty: 90 | Refills: 0
Start: 2018-01-14 | End: 2018-02-12

## 2018-01-14 RX ORDER — METOPROLOL TARTRATE 50 MG
1 TABLET ORAL
Qty: 60 | Refills: 0
Start: 2018-01-14 | End: 2018-02-12

## 2018-01-14 RX ADMIN — Medication 100 MILLIGRAM(S): at 05:48

## 2018-01-14 RX ADMIN — LIDOCAINE 1 PATCH: 4 CREAM TOPICAL at 12:37

## 2018-01-14 RX ADMIN — LIDOCAINE 1 PATCH: 4 CREAM TOPICAL at 02:10

## 2018-01-14 RX ADMIN — PANTOPRAZOLE SODIUM 40 MILLIGRAM(S): 20 TABLET, DELAYED RELEASE ORAL at 05:47

## 2018-01-14 RX ADMIN — ZOLPIDEM TARTRATE 5 MILLIGRAM(S): 10 TABLET ORAL at 00:14

## 2018-01-14 RX ADMIN — Medication 100 MILLIGRAM(S): at 13:17

## 2018-01-14 RX ADMIN — Medication 1 TABLET(S): at 05:48

## 2018-01-14 RX ADMIN — Medication 75 MICROGRAM(S): at 05:47

## 2018-01-14 RX ADMIN — HEPARIN SODIUM 5000 UNIT(S): 5000 INJECTION INTRAVENOUS; SUBCUTANEOUS at 05:48

## 2018-01-14 RX ADMIN — Medication 25 MILLIGRAM(S): at 05:47

## 2018-01-14 RX ADMIN — HEPARIN SODIUM 5000 UNIT(S): 5000 INJECTION INTRAVENOUS; SUBCUTANEOUS at 13:17

## 2018-01-14 NOTE — PROGRESS NOTE ADULT - SUBJECTIVE AND OBJECTIVE BOX
Subjective: per daughter translating, patient feels well. denies pain     Vital Signs:  Vital Signs Last 24 Hrs  T(C): 37.1 (01-14-18 @ 05:42), Max: 37.1 (01-14-18 @ 05:42)  T(F): 98.7 (01-14-18 @ 05:42), Max: 98.7 (01-14-18 @ 05:42)  HR: 77 (01-14-18 @ 05:42) (70 - 77)  BP: 119/66 (01-14-18 @ 05:42) (104/57 - 125/57)  RR: 17 (01-14-18 @ 05:42) (16 - 18)  SpO2: 96% (01-14-18 @ 05:42) (94% - 98%) on (O2)    Telemetry/Alarms:  General: WN/WD NAD  Neurology: Awake, nonfocal, WATSON x 4  Eyes: Scleras clear, PERRLA/ EOMI, Gross vision intact  ENT:Gross hearing intact, grossly patent pharynx, no stridor  Neck: Neck supple, trachea midline, No JVD,   Respiratory: CTA B/L, No wheezing, rales, rhonchi  CV: RRR, S1S2, no murmurs, rubs or gallops  Abdominal: Soft, NT, ND +BS,   Extremities: No edema, + peripheral pulses  Skin: No Rashes, Hematoma, Ecchymosis  Lymphatic: No Neck, axilla, groin LAD  Psych: Oriented x 3, normal affect  Incisions: cdi  Tubes: right chest tube to waterseal; drained 200 ml/24 hours; no airleak observed   Relevant labs, radiology and Medications reviewed                        11.5   7.35  )-----------( 170      ( 13 Jan 2018 06:27 )             33.6     01-13    136  |  101  |  12  ----------------------------<  129<H>  4.6   |  23  |  0.95    Ca    8.6      13 Jan 2018 06:27        MEDICATIONS  (STANDING):  dextrose 5%. 1000 milliLiter(s) (50 mL/Hr) IV Continuous <Continuous>  dextrose 50% Injectable 12.5 Gram(s) IV Push once  dextrose 50% Injectable 25 Gram(s) IV Push once  dextrose 50% Injectable 25 Gram(s) IV Push once  diltiazem    Tablet 60 milliGRAM(s) Oral three times a day  docusate sodium 100 milliGRAM(s) Oral three times a day  heparin  Injectable 5000 Unit(s) SubCutaneous every 8 hours  insulin lispro (HumaLOG) corrective regimen sliding scale   SubCutaneous three times a day before meals  levothyroxine 75 MICROGram(s) Oral daily  lidocaine   Patch 1 Patch Transdermal daily  metoprolol     tartrate 25 milliGRAM(s) Oral two times a day  pantoprazole    Tablet 40 milliGRAM(s) Oral before breakfast  senna 2 Tablet(s) Oral at bedtime  simvastatin 20 milliGRAM(s) Oral at bedtime  trimethoprim  160 mG/sulfamethoxazole 800 mG 1 Tablet(s) Oral two times a day    MEDICATIONS  (PRN):  acetaminophen   Tablet. 325 milliGRAM(s) Oral every 4 hours PRN Mild Pain (1 - 3)  acetaminophen  IVPB. 1000 milliGRAM(s) IV Intermittent once PRN Moderate Pain (4 - 6)  dextrose Gel 1 Dose(s) Oral once PRN Blood Glucose LESS THAN 70 milliGRAM(s)/deciliter  glucagon  Injectable 1 milliGRAM(s) IntraMuscular once PRN Glucose LESS THAN 70 milligrams/deciliter  oxyCODONE    5 mG/acetaminophen 325 mG 1 Tablet(s) Oral every 4 hours PRN Moderate Pain (4 - 6)  zolpidem 5 milliGRAM(s) Oral at bedtime PRN Insomnia    Pertinent Physical Exam  I&O's Summary    13 Jan 2018 07:01  -  14 Jan 2018 07:00  --------------------------------------------------------  IN: 0 mL / OUT: 1760 mL / NET: -1760 mL        Assessment  77y Female  w/ PAST MEDICAL & SURGICAL HISTORY:  Vitamin D deficiency  Hypertension  Arthritis  Sciatica: injection done in Dec. 2017  Insomnia  Seasonal allergies  Nephrolithiasis: 9 mm kidney stone -- to start antibiotic today (1-4-18) and have placement of a stent by urologist before right VATS on 1-10-18  Snoring: BETITO precautions -- responds affirmatively to STOP BANG questionnaire -- admits to loud snoring; age &gt; 50; h/o htn  Lung mass: b/l lung masses; right side has uptake on PET scan  Lung mass: right lung in Jan. 2018  Obesity  Hypercholesterolemia  Diabetes mellitus: type II, diagnosed approximately 2008  Hypothyroid  Language barrier: native language Mandarin, Chinese; comprehends and verbalizes no Englsih  S/P appendectomy: greater than 30 years ago  Bleeding: hysterectomy 1985  admitted with complaints of Patient is a 77y old  Female who presents with a chief complaint of as per the daughter Luba, "they are operating on the right lung." (12 Jan 2018 21:07)  .  On (Date), patient underwent VATS (video-assisted thoracoscopic surgery)  . Postoperative course/issues: chest tube remains for increased output. patient  had episode of rapid afib on 1/11 - managed with cardizem, amio and magnesium    PLAN  Neuro: Pain management  Pulm: Encourage coughing, deep breathing and use of incentive spirometry. Wean off supplemental oxygen as able. Daily CXR.   Cardio: Monitor telemetry/alarms  GI: Tolerating diet. Continue stool softeners.  Renal: monitor urine output, supplement electrolytes as needed  Vasc: Heparin SC/SCDs for DVT prophylaxis  Heme: Stable H/H. .   ID: Off antibiotics. Stable.  Therapy: OOB/ambulate  Tubes: Monitor Chest tube output, airleak, and daily CXR   Disposition: Aim to D/C to home when chest tube is removed   Discussed with Cardiothoracic Team at AM rounds. Subjective: per daughter translating, patient feels well. denies pain     Vital Signs:  Vital Signs Last 24 Hrs  T(C): 37.1 (01-14-18 @ 05:42), Max: 37.1 (01-14-18 @ 05:42)  T(F): 98.7 (01-14-18 @ 05:42), Max: 98.7 (01-14-18 @ 05:42)  HR: 77 (01-14-18 @ 05:42) (70 - 77)  BP: 119/66 (01-14-18 @ 05:42) (104/57 - 125/57)  RR: 17 (01-14-18 @ 05:42) (16 - 18)  SpO2: 96% (01-14-18 @ 05:42) (94% - 98%) on (O2)    Telemetry/Alarms:  General: WN/WD NAD  Neurology: Awake, nonfocal, WATSON x 4  Eyes: Scleras clear, PERRLA/ EOMI, Gross vision intact  ENT:Gross hearing intact, grossly patent pharynx, no stridor  Neck: Neck supple, trachea midline, No JVD,   Respiratory: CTA B/L, No wheezing, rales, rhonchi  CV: RRR, S1S2, no murmurs, rubs or gallops  Abdominal: Soft, NT, ND +BS,   Extremities: No edema, + peripheral pulses  Skin: No Rashes, Hematoma, Ecchymosis  Lymphatic: No Neck, axilla, groin LAD  Psych: Oriented x 3, normal affect  Incisions: cdi  Tubes: right chest tube to waterseal; drained 200 ml/24 hours; no airleak observed   Relevant labs, radiology and Medications reviewed                        11.5   7.35  )-----------( 170      ( 13 Jan 2018 06:27 )             33.6     01-13    136  |  101  |  12  ----------------------------<  129<H>  4.6   |  23  |  0.95    Ca    8.6      13 Jan 2018 06:27        MEDICATIONS  (STANDING):  dextrose 5%. 1000 milliLiter(s) (50 mL/Hr) IV Continuous <Continuous>  dextrose 50% Injectable 12.5 Gram(s) IV Push once  dextrose 50% Injectable 25 Gram(s) IV Push once  dextrose 50% Injectable 25 Gram(s) IV Push once  diltiazem    Tablet 60 milliGRAM(s) Oral three times a day  docusate sodium 100 milliGRAM(s) Oral three times a day  heparin  Injectable 5000 Unit(s) SubCutaneous every 8 hours  insulin lispro (HumaLOG) corrective regimen sliding scale   SubCutaneous three times a day before meals  levothyroxine 75 MICROGram(s) Oral daily  lidocaine   Patch 1 Patch Transdermal daily  metoprolol     tartrate 25 milliGRAM(s) Oral two times a day  pantoprazole    Tablet 40 milliGRAM(s) Oral before breakfast  senna 2 Tablet(s) Oral at bedtime  simvastatin 20 milliGRAM(s) Oral at bedtime  trimethoprim  160 mG/sulfamethoxazole 800 mG 1 Tablet(s) Oral two times a day    MEDICATIONS  (PRN):  acetaminophen   Tablet. 325 milliGRAM(s) Oral every 4 hours PRN Mild Pain (1 - 3)  acetaminophen  IVPB. 1000 milliGRAM(s) IV Intermittent once PRN Moderate Pain (4 - 6)  dextrose Gel 1 Dose(s) Oral once PRN Blood Glucose LESS THAN 70 milliGRAM(s)/deciliter  glucagon  Injectable 1 milliGRAM(s) IntraMuscular once PRN Glucose LESS THAN 70 milligrams/deciliter  oxyCODONE    5 mG/acetaminophen 325 mG 1 Tablet(s) Oral every 4 hours PRN Moderate Pain (4 - 6)  zolpidem 5 milliGRAM(s) Oral at bedtime PRN Insomnia    Pertinent Physical Exam  I&O's Summary    13 Jan 2018 07:01  -  14 Jan 2018 07:00  --------------------------------------------------------  IN: 0 mL / OUT: 1760 mL / NET: -1760 mL        Assessment  77y Female  w/ PAST MEDICAL & SURGICAL HISTORY:  Vitamin D deficiency  Hypertension  Arthritis  Sciatica: injection done in Dec. 2017  Insomnia  Seasonal allergies  Nephrolithiasis: 9 mm kidney stone -- to start antibiotic today (1-4-18) and have placement of a stent by urologist before right VATS on 1-10-18  Snoring: BETITO precautions -- responds affirmatively to STOP BANG questionnaire -- admits to loud snoring; age &gt; 50; h/o htn  Lung mass: b/l lung masses; right side has uptake on PET scan  Lung mass: right lung in Jan. 2018  Obesity  Hypercholesterolemia  Diabetes mellitus: type II, diagnosed approximately 2008  Hypothyroid  Language barrier: native language Mandarin, Chinese; comprehends and verbalizes no Englsih  S/P appendectomy: greater than 30 years ago  Bleeding: hysterectomy 1985  admitted with complaints of Patient is a 77y old  Female who presents with a chief complaint of as per the daughter Luba, "they are operating on the right lung." (12 Jan 2018 21:07)  .  On 1/10/18, patient underwent right VATS (video-assisted thoracoscopic surgery), RUL lobectomy, RML wedge resection   . Postoperative course/issues: chest tube remains for increased output. patient  had episode of rapid afib on 1/11 - managed with cardizem, amio and magnesium    PLAN  Neuro: Pain management  Pulm: Encourage coughing, deep breathing and use of incentive spirometry. Wean off supplemental oxygen as able. Daily CXR.   Cardio: Monitor telemetry/alarms  GI: Tolerating diet. Continue stool softeners.  Renal: monitor urine output, supplement electrolytes as needed  Vasc: Heparin SC/SCDs for DVT prophylaxis  Heme: Stable H/H. .   ID: Off antibiotics. Stable.  Therapy: OOB/ambulate  Tubes: Monitor Chest tube output, airleak, and daily CXR   Disposition: Aim to D/C to home when chest tube is removed   Discussed with Cardiothoracic Team at AM rounds.

## 2018-01-18 ENCOUNTER — TRANSCRIPTION ENCOUNTER (OUTPATIENT)
Age: 78
End: 2018-01-18

## 2018-01-22 ENCOUNTER — APPOINTMENT (OUTPATIENT)
Dept: RADIOLOGY | Facility: IMAGING CENTER | Age: 78
End: 2018-01-22
Payer: MEDICARE

## 2018-01-22 ENCOUNTER — OUTPATIENT (OUTPATIENT)
Dept: OUTPATIENT SERVICES | Facility: HOSPITAL | Age: 78
LOS: 1 days | End: 2018-01-22
Payer: MEDICARE

## 2018-01-22 DIAGNOSIS — Z90.49 ACQUIRED ABSENCE OF OTHER SPECIFIED PARTS OF DIGESTIVE TRACT: Chronic | ICD-10-CM

## 2018-01-22 DIAGNOSIS — R58 HEMORRHAGE, NOT ELSEWHERE CLASSIFIED: Chronic | ICD-10-CM

## 2018-01-22 DIAGNOSIS — Z00.8 ENCOUNTER FOR OTHER GENERAL EXAMINATION: ICD-10-CM

## 2018-01-22 PROCEDURE — 71046 X-RAY EXAM CHEST 2 VIEWS: CPT | Mod: 26

## 2018-01-22 PROCEDURE — 71046 X-RAY EXAM CHEST 2 VIEWS: CPT

## 2018-02-02 ENCOUNTER — APPOINTMENT (OUTPATIENT)
Dept: UROLOGY | Facility: CLINIC | Age: 78
End: 2018-02-02
Payer: MEDICARE

## 2018-02-02 VITALS
RESPIRATION RATE: 16 BRPM | DIASTOLIC BLOOD PRESSURE: 73 MMHG | HEART RATE: 74 BPM | TEMPERATURE: 97.7 F | WEIGHT: 182 LBS | SYSTOLIC BLOOD PRESSURE: 111 MMHG | HEIGHT: 65 IN | BODY MASS INDEX: 30.32 KG/M2

## 2018-02-02 DIAGNOSIS — Z87.898 PERSONAL HISTORY OF OTHER SPECIFIED CONDITIONS: ICD-10-CM

## 2018-02-02 DIAGNOSIS — Z85.118 PERSONAL HISTORY OF OTHER MALIGNANT NEOPLASM OF BRONCHUS AND LUNG: ICD-10-CM

## 2018-02-02 DIAGNOSIS — Z86.39 PERSONAL HISTORY OF OTHER ENDOCRINE, NUTRITIONAL AND METABOLIC DISEASE: ICD-10-CM

## 2018-02-02 PROCEDURE — 99204 OFFICE O/P NEW MOD 45 MIN: CPT

## 2018-02-05 ENCOUNTER — APPOINTMENT (OUTPATIENT)
Age: 78
End: 2018-02-05

## 2018-02-05 ENCOUNTER — APPOINTMENT (OUTPATIENT)
Dept: THORACIC SURGERY | Facility: CLINIC | Age: 78
End: 2018-02-05
Payer: MEDICARE

## 2018-02-05 VITALS
RESPIRATION RATE: 16 BRPM | SYSTOLIC BLOOD PRESSURE: 111 MMHG | BODY MASS INDEX: 29.95 KG/M2 | WEIGHT: 180 LBS | DIASTOLIC BLOOD PRESSURE: 75 MMHG | OXYGEN SATURATION: 96 % | TEMPERATURE: 98.6 F | HEART RATE: 71 BPM

## 2018-02-05 LAB — BACTERIA UR CULT: ABNORMAL

## 2018-02-05 PROCEDURE — 99024 POSTOP FOLLOW-UP VISIT: CPT

## 2018-02-05 RX ORDER — ATENOLOL 25 MG/1
25 TABLET ORAL
Refills: 0 | Status: COMPLETED | COMMUNITY
End: 2018-02-05

## 2018-02-07 ENCOUNTER — OUTPATIENT (OUTPATIENT)
Dept: OUTPATIENT SERVICES | Facility: HOSPITAL | Age: 78
LOS: 1 days | Discharge: HOME | End: 2018-02-07

## 2018-02-07 DIAGNOSIS — C34.90 MALIGNANT NEOPLASM OF UNSPECIFIED PART OF UNSPECIFIED BRONCHUS OR LUNG: ICD-10-CM

## 2018-02-07 DIAGNOSIS — Z90.49 ACQUIRED ABSENCE OF OTHER SPECIFIED PARTS OF DIGESTIVE TRACT: Chronic | ICD-10-CM

## 2018-02-07 DIAGNOSIS — R58 HEMORRHAGE, NOT ELSEWHERE CLASSIFIED: Chronic | ICD-10-CM

## 2018-02-13 ENCOUNTER — RESULT REVIEW (OUTPATIENT)
Age: 78
End: 2018-02-13

## 2018-02-13 ENCOUNTER — APPOINTMENT (OUTPATIENT)
Dept: HEMATOLOGY ONCOLOGY | Facility: CLINIC | Age: 78
End: 2018-02-13
Payer: MEDICARE

## 2018-02-13 VITALS
HEART RATE: 77 BPM | TEMPERATURE: 98 F | HEIGHT: 63.15 IN | SYSTOLIC BLOOD PRESSURE: 111 MMHG | DIASTOLIC BLOOD PRESSURE: 70 MMHG | WEIGHT: 184.06 LBS | OXYGEN SATURATION: 95 % | RESPIRATION RATE: 16 BRPM | BODY MASS INDEX: 32.61 KG/M2

## 2018-02-13 DIAGNOSIS — T45.1X5A NAUSEA WITH VOMITING, UNSPECIFIED: ICD-10-CM

## 2018-02-13 DIAGNOSIS — R11.2 NAUSEA WITH VOMITING, UNSPECIFIED: ICD-10-CM

## 2018-02-13 LAB
BASOPHILS # BLD AUTO: 0.1 K/UL — SIGNIFICANT CHANGE UP (ref 0–0.2)
BASOPHILS NFR BLD AUTO: 1.2 % — SIGNIFICANT CHANGE UP (ref 0–2)
EOSINOPHIL # BLD AUTO: 0.1 K/UL — SIGNIFICANT CHANGE UP (ref 0–0.5)
EOSINOPHIL NFR BLD AUTO: 1.8 % — SIGNIFICANT CHANGE UP (ref 0–6)
HCT VFR BLD CALC: 38 % — SIGNIFICANT CHANGE UP (ref 34.5–45)
HGB BLD-MCNC: 12.6 G/DL — SIGNIFICANT CHANGE UP (ref 11.5–15.5)
LYMPHOCYTES # BLD AUTO: 2.3 K/UL — SIGNIFICANT CHANGE UP (ref 1–3.3)
LYMPHOCYTES # BLD AUTO: 41.9 % — SIGNIFICANT CHANGE UP (ref 13–44)
MCHC RBC-ENTMCNC: 32 PG — SIGNIFICANT CHANGE UP (ref 27–34)
MCHC RBC-ENTMCNC: 33.1 G/DL — SIGNIFICANT CHANGE UP (ref 32–36)
MCV RBC AUTO: 96.7 FL — SIGNIFICANT CHANGE UP (ref 80–100)
MONOCYTES # BLD AUTO: 0.4 K/UL — SIGNIFICANT CHANGE UP (ref 0–0.9)
MONOCYTES NFR BLD AUTO: 6.4 % — SIGNIFICANT CHANGE UP (ref 2–14)
NEUTROPHILS # BLD AUTO: 2.6 K/UL — SIGNIFICANT CHANGE UP (ref 1.8–7.4)
NEUTROPHILS NFR BLD AUTO: 48.7 % — SIGNIFICANT CHANGE UP (ref 43–77)
PLATELET # BLD AUTO: 181 K/UL — SIGNIFICANT CHANGE UP (ref 150–400)
RBC # BLD: 3.93 M/UL — SIGNIFICANT CHANGE UP (ref 3.8–5.2)
RBC # FLD: 12.2 % — SIGNIFICANT CHANGE UP (ref 10.3–14.5)
WBC # BLD: 5.4 K/UL — SIGNIFICANT CHANGE UP (ref 3.8–10.5)
WBC # FLD AUTO: 5.4 K/UL — SIGNIFICANT CHANGE UP (ref 3.8–10.5)

## 2018-02-13 PROCEDURE — 99205 OFFICE O/P NEW HI 60 MIN: CPT

## 2018-02-15 ENCOUNTER — RESULT REVIEW (OUTPATIENT)
Age: 78
End: 2018-02-15

## 2018-02-15 ENCOUNTER — APPOINTMENT (OUTPATIENT)
Dept: INFUSION THERAPY | Facility: HOSPITAL | Age: 78
End: 2018-02-15

## 2018-02-15 LAB
BASOPHILS # BLD AUTO: 0 K/UL — SIGNIFICANT CHANGE UP (ref 0–0.2)
BASOPHILS NFR BLD AUTO: 0.8 % — SIGNIFICANT CHANGE UP (ref 0–2)
EOSINOPHIL # BLD AUTO: 0.2 K/UL — SIGNIFICANT CHANGE UP (ref 0–0.5)
EOSINOPHIL NFR BLD AUTO: 2.7 % — SIGNIFICANT CHANGE UP (ref 0–6)
HCT VFR BLD CALC: 37 % — SIGNIFICANT CHANGE UP (ref 34.5–45)
HGB BLD-MCNC: 12.5 G/DL — SIGNIFICANT CHANGE UP (ref 11.5–15.5)
LYMPHOCYTES # BLD AUTO: 2.2 K/UL — SIGNIFICANT CHANGE UP (ref 1–3.3)
LYMPHOCYTES # BLD AUTO: 39.6 % — SIGNIFICANT CHANGE UP (ref 13–44)
MCHC RBC-ENTMCNC: 32.5 PG — SIGNIFICANT CHANGE UP (ref 27–34)
MCHC RBC-ENTMCNC: 33.8 G/DL — SIGNIFICANT CHANGE UP (ref 32–36)
MCV RBC AUTO: 96.3 FL — SIGNIFICANT CHANGE UP (ref 80–100)
MONOCYTES # BLD AUTO: 0.3 K/UL — SIGNIFICANT CHANGE UP (ref 0–0.9)
MONOCYTES NFR BLD AUTO: 5.9 % — SIGNIFICANT CHANGE UP (ref 2–14)
NEUTROPHILS # BLD AUTO: 2.8 K/UL — SIGNIFICANT CHANGE UP (ref 1.8–7.4)
NEUTROPHILS NFR BLD AUTO: 50.9 % — SIGNIFICANT CHANGE UP (ref 43–77)
PLATELET # BLD AUTO: 168 K/UL — SIGNIFICANT CHANGE UP (ref 150–400)
RBC # BLD: 3.84 M/UL — SIGNIFICANT CHANGE UP (ref 3.8–5.2)
RBC # FLD: 12.2 % — SIGNIFICANT CHANGE UP (ref 10.3–14.5)
WBC # BLD: 5.5 K/UL — SIGNIFICANT CHANGE UP (ref 3.8–10.5)
WBC # FLD AUTO: 5.5 K/UL — SIGNIFICANT CHANGE UP (ref 3.8–10.5)

## 2018-02-16 DIAGNOSIS — R11.2 NAUSEA WITH VOMITING, UNSPECIFIED: ICD-10-CM

## 2018-02-16 DIAGNOSIS — Z51.11 ENCOUNTER FOR ANTINEOPLASTIC CHEMOTHERAPY: ICD-10-CM

## 2018-02-17 PROBLEM — C34.90 MALIGNANT NEOPLASM OF UNSPECIFIED PART OF UNSPECIFIED BRONCHUS OR LUNG: Chronic | Status: ACTIVE | Noted: 2018-02-14

## 2018-02-21 ENCOUNTER — OUTPATIENT (OUTPATIENT)
Dept: OUTPATIENT SERVICES | Facility: HOSPITAL | Age: 78
LOS: 1 days | End: 2018-02-21
Payer: MEDICARE

## 2018-02-21 VITALS
DIASTOLIC BLOOD PRESSURE: 63 MMHG | TEMPERATURE: 98 F | RESPIRATION RATE: 14 BRPM | WEIGHT: 179.9 LBS | HEART RATE: 81 BPM | OXYGEN SATURATION: 96 % | HEIGHT: 64 IN | SYSTOLIC BLOOD PRESSURE: 117 MMHG

## 2018-02-21 DIAGNOSIS — R58 HEMORRHAGE, NOT ELSEWHERE CLASSIFIED: Chronic | ICD-10-CM

## 2018-02-21 DIAGNOSIS — Z98.890 OTHER SPECIFIED POSTPROCEDURAL STATES: Chronic | ICD-10-CM

## 2018-02-21 DIAGNOSIS — Z90.49 ACQUIRED ABSENCE OF OTHER SPECIFIED PARTS OF DIGESTIVE TRACT: Chronic | ICD-10-CM

## 2018-02-21 DIAGNOSIS — G47.33 OBSTRUCTIVE SLEEP APNEA (ADULT) (PEDIATRIC): ICD-10-CM

## 2018-02-21 DIAGNOSIS — N20.1 CALCULUS OF URETER: ICD-10-CM

## 2018-02-21 DIAGNOSIS — Z79.82 LONG TERM (CURRENT) USE OF ASPIRIN: ICD-10-CM

## 2018-02-21 DIAGNOSIS — Z01.818 ENCOUNTER FOR OTHER PREPROCEDURAL EXAMINATION: ICD-10-CM

## 2018-02-21 DIAGNOSIS — E11.9 TYPE 2 DIABETES MELLITUS WITHOUT COMPLICATIONS: ICD-10-CM

## 2018-02-21 RX ORDER — ERGOCALCIFEROL 1.25 MG/1
1 CAPSULE ORAL
Qty: 0 | Refills: 0 | COMMUNITY

## 2018-02-21 RX ORDER — CEFAZOLIN SODIUM 1 G
2000 VIAL (EA) INJECTION ONCE
Qty: 0 | Refills: 0 | Status: DISCONTINUED | OUTPATIENT
Start: 2018-02-28 | End: 2018-03-15

## 2018-02-21 NOTE — H&P PST ADULT - HISTORY OF PRESENT ILLNESS
77 yo female with h/o HTN, HLD, Type 2 DM, atrial fibrillation (diagnosed 1/2018, no AC), and adenocarcinoma of the right lung s/p right VATS, right upper lobectomy with en bloc wedge resection of RML, MLND, and intercostal nerve block 1/10/2018. Pt is currently receiving chemotherapy (last treatment was 2/15/18.) CT of chest done prior to lung surgery in 11/2017 revealed left hydronephrosis. PET CT 11/28/17 revealed 9 mm left proximal obstructing ureteral stone with 4 mm proximal stone as well. Pt is scheduled for cystoscopy, left ureteroscopy and laser lithotripsy on 2/28/2018.    Pt is primarily Mandarin-speaking, refusing free translation services while at [facility], preferring instead that her daughter Ama translate.

## 2018-02-21 NOTE — H&P PST ADULT - PMH
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 2018  Nephrolithiasis  9 mm kidney stone -- to start antibiotic today (1-4-18) and have placement of a stent by urologist before right VATS on 1-10-18  Obesity    Paroxysmal atrial fibrillation  diagnosed 1/2018  Sciatica  injection done in Dec. 2017  Seasonal allergies    Snoring  BETITO precautions -- responds affirmatively to STOP BANG questionnaire -- admits to loud snoring; age > 50; h/o htn  Vitamin D deficiency Adenocarcinoma  of right lung - s/p VATS 1/2018 - chemotherapy (current)  Arthritis    Diabetes mellitus  type II, diagnosed approximately 2008  Hypercholesterolemia    Hypertension    Hypothyroid    Insomnia    Language barrier  native language Mandarin, Chinese; comprehends and verbalizes no Englsih  Lung cancer    Nephrolithiasis  9 mm kidney stone - left proximal ureter  Obesity    Paroxysmal atrial fibrillation  diagnosed 1/2018  Sciatica  injection done in Dec. 2017  Seasonal allergies    Snoring  BETITO precautions -- responds affirmatively to STOP BANG questionnaire -- admits to loud snoring; age > 50; h/o htn  Vitamin D deficiency

## 2018-02-21 NOTE — H&P PST ADULT - PSH
Bleeding  hysterectomy 1985  History of lung surgery  1/10/18 - unilateral right VATS RU lobectomy with en bloc wedge resection of RML, MLND, and intercostal nerve block  S/P appendectomy  greater than 30 years ago H/O: hysterectomy  1985 -menorrhagia, benign disease  History of lung surgery  1/10/18 - unilateral right VATS RU lobectomy with en bloc wedge resection of RML, MLND, and intercostal nerve block  S/P appendectomy  greater than 30 years ago

## 2018-02-21 NOTE — H&P PST ADULT - NSANTHOSAYNRD_GEN_A_CORE
No. BETITO screening performed.  STOP BANG Legend: 0-2 = LOW Risk; 3-4 = INTERMEDIATE Risk; 5-8 = HIGH Risk No. BETITO screening performed.  STOP BANG Legend: 0-2 = LOW Risk; 3-4 = INTERMEDIATE Risk; 5-8 = HIGH Risk/neck = 15 inches

## 2018-02-22 PROBLEM — C80.1 MALIGNANT (PRIMARY) NEOPLASM, UNSPECIFIED: Chronic | Status: ACTIVE | Noted: 2018-02-14

## 2018-02-22 LAB
CULTURE RESULTS: SIGNIFICANT CHANGE UP
SPECIMEN SOURCE: SIGNIFICANT CHANGE UP

## 2018-02-23 ENCOUNTER — RESULT REVIEW (OUTPATIENT)
Age: 78
End: 2018-02-23

## 2018-02-23 ENCOUNTER — APPOINTMENT (OUTPATIENT)
Dept: HEMATOLOGY ONCOLOGY | Facility: CLINIC | Age: 78
End: 2018-02-23
Payer: MEDICARE

## 2018-02-23 VITALS
DIASTOLIC BLOOD PRESSURE: 70 MMHG | WEIGHT: 182.98 LBS | BODY MASS INDEX: 32.26 KG/M2 | HEART RATE: 80 BPM | OXYGEN SATURATION: 96 % | RESPIRATION RATE: 16 BRPM | TEMPERATURE: 98 F | SYSTOLIC BLOOD PRESSURE: 118 MMHG

## 2018-02-23 LAB
HCT VFR BLD CALC: 35.4 % — SIGNIFICANT CHANGE UP (ref 34.5–45)
HGB BLD-MCNC: 12.2 G/DL — SIGNIFICANT CHANGE UP (ref 11.5–15.5)
MCHC RBC-ENTMCNC: 32.8 PG — SIGNIFICANT CHANGE UP (ref 27–34)
MCHC RBC-ENTMCNC: 34.4 G/DL — SIGNIFICANT CHANGE UP (ref 32–36)
MCV RBC AUTO: 95.3 FL — SIGNIFICANT CHANGE UP (ref 80–100)
PLATELET # BLD AUTO: 124 K/UL — LOW (ref 150–400)
RBC # BLD: 3.63 M/UL — LOW (ref 3.8–5.2)
RBC # FLD: 11.4 % — SIGNIFICANT CHANGE UP (ref 10.3–14.5)
WBC # BLD: 3.4 K/UL — LOW (ref 3.8–10.5)
WBC # FLD AUTO: 3.4 K/UL — LOW (ref 3.8–10.5)

## 2018-02-23 PROCEDURE — 99214 OFFICE O/P EST MOD 30 MIN: CPT

## 2018-02-28 ENCOUNTER — APPOINTMENT (OUTPATIENT)
Dept: UROLOGY | Facility: HOSPITAL | Age: 78
End: 2018-02-28

## 2018-02-28 ENCOUNTER — OUTPATIENT (OUTPATIENT)
Dept: OUTPATIENT SERVICES | Facility: HOSPITAL | Age: 78
LOS: 1 days | End: 2018-02-28
Payer: MEDICARE

## 2018-02-28 ENCOUNTER — RESULT REVIEW (OUTPATIENT)
Age: 78
End: 2018-02-28

## 2018-02-28 VITALS
SYSTOLIC BLOOD PRESSURE: 117 MMHG | OXYGEN SATURATION: 99 % | RESPIRATION RATE: 21 BRPM | DIASTOLIC BLOOD PRESSURE: 58 MMHG | TEMPERATURE: 97 F | HEART RATE: 75 BPM

## 2018-02-28 VITALS
SYSTOLIC BLOOD PRESSURE: 109 MMHG | HEART RATE: 70 BPM | RESPIRATION RATE: 16 BRPM | OXYGEN SATURATION: 98 % | DIASTOLIC BLOOD PRESSURE: 72 MMHG | TEMPERATURE: 98 F

## 2018-02-28 DIAGNOSIS — N20.1 CALCULUS OF URETER: ICD-10-CM

## 2018-02-28 DIAGNOSIS — Z98.890 OTHER SPECIFIED POSTPROCEDURAL STATES: Chronic | ICD-10-CM

## 2018-02-28 DIAGNOSIS — Z90.49 ACQUIRED ABSENCE OF OTHER SPECIFIED PARTS OF DIGESTIVE TRACT: Chronic | ICD-10-CM

## 2018-02-28 LAB
GLUCOSE BLDC GLUCOMTR-MCNC: 121 MG/DL — HIGH (ref 70–99)
GLUCOSE BLDC GLUCOMTR-MCNC: 137 MG/DL — HIGH (ref 70–99)

## 2018-02-28 PROCEDURE — 88300 SURGICAL PATH GROSS: CPT | Mod: 26

## 2018-02-28 PROCEDURE — 52356 CYSTO/URETERO W/LITHOTRIPSY: CPT | Mod: LT

## 2018-02-28 PROCEDURE — 76000 FLUOROSCOPY <1 HR PHYS/QHP: CPT

## 2018-02-28 PROCEDURE — 87086 URINE CULTURE/COLONY COUNT: CPT

## 2018-02-28 PROCEDURE — G0463: CPT

## 2018-02-28 RX ORDER — SODIUM CHLORIDE 9 MG/ML
1000 INJECTION, SOLUTION INTRAVENOUS
Qty: 0 | Refills: 0 | Status: DISCONTINUED | OUTPATIENT
Start: 2018-02-28 | End: 2018-03-15

## 2018-02-28 RX ORDER — LIDOCAINE HCL 20 MG/ML
0.2 VIAL (ML) INJECTION ONCE
Qty: 0 | Refills: 0 | Status: DISCONTINUED | OUTPATIENT
Start: 2018-02-28 | End: 2018-02-28

## 2018-02-28 RX ORDER — CEPHALEXIN 500 MG
1 CAPSULE ORAL
Qty: 12 | Refills: 0
Start: 2018-02-28 | End: 2018-03-02

## 2018-02-28 RX ORDER — SODIUM CHLORIDE 9 MG/ML
3 INJECTION INTRAMUSCULAR; INTRAVENOUS; SUBCUTANEOUS EVERY 8 HOURS
Qty: 0 | Refills: 0 | Status: DISCONTINUED | OUTPATIENT
Start: 2018-02-28 | End: 2018-02-28

## 2018-02-28 RX ORDER — ACETAMINOPHEN WITH CODEINE 300MG-30MG
1 TABLET ORAL
Qty: 12 | Refills: 0 | OUTPATIENT
Start: 2018-02-28 | End: 2018-03-02

## 2018-02-28 RX ORDER — ACETAMINOPHEN WITH CODEINE 300MG-30MG
1 TABLET ORAL
Qty: 20 | Refills: 0
Start: 2018-02-28

## 2018-02-28 RX ORDER — HYDROMORPHONE HYDROCHLORIDE 2 MG/ML
0.5 INJECTION INTRAMUSCULAR; INTRAVENOUS; SUBCUTANEOUS
Qty: 0 | Refills: 0 | Status: DISCONTINUED | OUTPATIENT
Start: 2018-02-28 | End: 2018-02-28

## 2018-02-28 RX ORDER — CEPHALEXIN 500 MG
1 CAPSULE ORAL
Qty: 9 | Refills: 0 | OUTPATIENT
Start: 2018-02-28 | End: 2018-03-02

## 2018-02-28 NOTE — ASU DISCHARGE PLAN (ADULT/PEDIATRIC). - MEDICATION SUMMARY - MEDICATIONS TO TAKE
I will START or STAY ON the medications listed below when I get home from the hospital:    dexamethasone 4 mg oral tablet  -- 1 tab(s) by mouth 2 times a day, As Needed for nausea  -- Indication: For nausea    acetaminophen-codeine 300 mg-30 mg oral tablet  -- 1 tab(s) by mouth every 6 hours, As Needed -for severe pain MDD:4 tabs   -- Caution federal law prohibits the transfer of this drug to any person other  than the person for whom it was prescribed.  May cause drowsiness.  Alcohol may intensify this effect.  Use care when operating dangerous machinery.  This product contains acetaminophen.  Do not use  with any other product containing acetaminophen to prevent possible liver damage.  Using more of this medication than prescribed may cause serious breathing problems.    -- Indication: For pain    aspirin 81 mg oral tablet  -- 1 tab(s) by mouth once a day  -- Indication: For Cardiac health    dilTIAZem 60 mg oral tablet  -- 1 tab(s) by mouth 3 times a day  -- Indication: For atrial fibrillation    glyBURIDE micronized 3 mg oral tablet  -- 1 tab(s) by mouth once a day  -- Indication: For diabetes    metFORMIN 850 mg oral tablet  -- 1 tab(s) by mouth once a day (in the morning)  -- Indication: For diabetes    metoclopramide 10 mg oral tablet  -- 1 tab(s) by mouth 4 times a day (before meals and at bedtime), As Needed  -- Indication: For antiemetic    simvastatin 20 mg oral tablet  -- 1 tab(s) by mouth once a day (at bedtime)  -- Indication: For hyperlipidemia    zolpidem 10 mg oral tablet  -- 0.5 tab(s) by mouth once a day (at bedtime), As Needed  -- Indication: For anxiolytic    metoprolol tartrate 25 mg oral tablet  -- 1 tab(s) by mouth 2 times a day  -- Indication: For hypertension    cephalexin 500 mg oral capsule  -- 1 cap(s) by mouth 3 times a day   -- Finish all this medication unless otherwise directed by prescriber.    -- Indication: For UTI prophylaxis    docusate sodium 100 mg oral capsule  -- 1 cap(s) by mouth 3 times a day  -- Indication: For Constipation    Levoxyl 75 mcg (0.075 mg) oral tablet  -- 1 tab(s) by mouth once a day. AM  -- Indication: For hypothyroidism    folic acid 1 mg oral tablet  -- 1 tab(s) by mouth once a day  -- Indication: For vitamin I will START or STAY ON the medications listed below when I get home from the hospital:    dexamethasone 4 mg oral tablet  -- 1 tab(s) by mouth 2 times a day, As Needed for nausea  -- Indication: For nausea    acetaminophen-codeine 300 mg-30 mg oral tablet  -- 1 tab(s) by mouth every 4 hours, As Needed MDD:8  -- Caution federal law prohibits the transfer of this drug to any person other  than the person for whom it was prescribed.  May cause drowsiness.  Alcohol may intensify this effect.  Use care when operating dangerous machinery.  This product contains acetaminophen.  Do not use  with any other product containing acetaminophen to prevent possible liver damage.  Using more of this medication than prescribed may cause serious breathing problems.    -- Indication: For Pain medication     aspirin 81 mg oral tablet  -- 1 tab(s) by mouth once a day  -- Indication: For Cardiac health    dilTIAZem 60 mg oral tablet  -- 1 tab(s) by mouth 3 times a day  -- Indication: For atrial fibrillation    glyBURIDE micronized 3 mg oral tablet  -- 1 tab(s) by mouth once a day  -- Indication: For diabetes    metFORMIN 850 mg oral tablet  -- 1 tab(s) by mouth once a day (in the morning)  -- Indication: For diabetes    metoclopramide 10 mg oral tablet  -- 1 tab(s) by mouth 4 times a day (before meals and at bedtime), As Needed  -- Indication: For antiemetic    simvastatin 20 mg oral tablet  -- 1 tab(s) by mouth once a day (at bedtime)  -- Indication: For hyperlipidemia    zolpidem 10 mg oral tablet  -- 0.5 tab(s) by mouth once a day (at bedtime), As Needed  -- Indication: For anxiolytic    metoprolol tartrate 25 mg oral tablet  -- 1 tab(s) by mouth 2 times a day  -- Indication: For hypertension    Keflex 500 mg oral capsule  -- 1 cap(s) by mouth 4 times a day   -- Finish all this medication unless otherwise directed by prescriber.    -- Indication: For antibiotic     docusate sodium 100 mg oral capsule  -- 1 cap(s) by mouth 3 times a day  -- Indication: For Constipation    Levoxyl 75 mcg (0.075 mg) oral tablet  -- 1 tab(s) by mouth once a day. AM  -- Indication: For hypothyroidism    folic acid 1 mg oral tablet  -- 1 tab(s) by mouth once a day  -- Indication: For vitamin

## 2018-03-01 PROCEDURE — C2617: CPT

## 2018-03-01 PROCEDURE — C1726: CPT

## 2018-03-01 PROCEDURE — 82962 GLUCOSE BLOOD TEST: CPT

## 2018-03-01 PROCEDURE — C1769: CPT

## 2018-03-01 PROCEDURE — C1758: CPT

## 2018-03-01 PROCEDURE — C1894: CPT

## 2018-03-01 PROCEDURE — C1889: CPT

## 2018-03-01 PROCEDURE — 52356 CYSTO/URETERO W/LITHOTRIPSY: CPT | Mod: LT

## 2018-03-01 PROCEDURE — 82365 CALCULUS SPECTROSCOPY: CPT

## 2018-03-01 PROCEDURE — 88300 SURGICAL PATH GROSS: CPT

## 2018-03-01 PROCEDURE — 76000 FLUOROSCOPY <1 HR PHYS/QHP: CPT

## 2018-03-04 LAB — SURGICAL PATHOLOGY STUDY: SIGNIFICANT CHANGE UP

## 2018-03-05 ENCOUNTER — APPOINTMENT (OUTPATIENT)
Dept: UROLOGY | Facility: CLINIC | Age: 78
End: 2018-03-05
Payer: MEDICARE

## 2018-03-05 ENCOUNTER — TRANSCRIPTION ENCOUNTER (OUTPATIENT)
Age: 78
End: 2018-03-05

## 2018-03-05 VITALS
DIASTOLIC BLOOD PRESSURE: 70 MMHG | BODY MASS INDEX: 31.73 KG/M2 | RESPIRATION RATE: 16 BRPM | TEMPERATURE: 97.7 F | OXYGEN SATURATION: 97 % | SYSTOLIC BLOOD PRESSURE: 108 MMHG | HEART RATE: 74 BPM | WEIGHT: 180 LBS

## 2018-03-05 PROCEDURE — 52310 CYSTOSCOPY AND TREATMENT: CPT

## 2018-03-06 LAB — COMPN STONE: SIGNIFICANT CHANGE UP

## 2018-03-08 ENCOUNTER — APPOINTMENT (OUTPATIENT)
Dept: INFUSION THERAPY | Facility: HOSPITAL | Age: 78
End: 2018-03-08

## 2018-03-08 ENCOUNTER — RESULT REVIEW (OUTPATIENT)
Age: 78
End: 2018-03-08

## 2018-03-08 ENCOUNTER — LABORATORY RESULT (OUTPATIENT)
Age: 78
End: 2018-03-08

## 2018-03-08 LAB
EOSINOPHIL # BLD AUTO: 0.1 K/UL — SIGNIFICANT CHANGE UP (ref 0–0.5)
EOSINOPHIL NFR BLD AUTO: 1 % — SIGNIFICANT CHANGE UP (ref 0–6)
HCT VFR BLD CALC: 31.1 % — LOW (ref 34.5–45)
HGB BLD-MCNC: 10.7 G/DL — LOW (ref 11.5–15.5)
LYMPHOCYTES # BLD AUTO: 2.2 K/UL — SIGNIFICANT CHANGE UP (ref 1–3.3)
LYMPHOCYTES # BLD AUTO: 65 % — HIGH (ref 13–44)
MCHC RBC-ENTMCNC: 32.6 PG — SIGNIFICANT CHANGE UP (ref 27–34)
MCHC RBC-ENTMCNC: 34.3 G/DL — SIGNIFICANT CHANGE UP (ref 32–36)
MCV RBC AUTO: 94.9 FL — SIGNIFICANT CHANGE UP (ref 80–100)
MONOCYTES # BLD AUTO: 0.3 K/UL — SIGNIFICANT CHANGE UP (ref 0–0.9)
MONOCYTES NFR BLD AUTO: 11 % — SIGNIFICANT CHANGE UP (ref 2–14)
NEUTROPHILS # BLD AUTO: 0.8 K/UL — LOW (ref 1.8–7.4)
NEUTROPHILS NFR BLD AUTO: 23 % — LOW (ref 43–77)
PLAT MORPH BLD: NORMAL — SIGNIFICANT CHANGE UP
PLATELET # BLD AUTO: 216 K/UL — SIGNIFICANT CHANGE UP (ref 150–400)
RBC # BLD: 3.28 M/UL — LOW (ref 3.8–5.2)
RBC # FLD: 12.4 % — SIGNIFICANT CHANGE UP (ref 10.3–14.5)
RBC BLD AUTO: SIGNIFICANT CHANGE UP
WBC # BLD: 3.5 K/UL — LOW (ref 3.8–10.5)
WBC # FLD AUTO: 3.5 K/UL — LOW (ref 3.8–10.5)

## 2018-03-13 ENCOUNTER — OUTPATIENT (OUTPATIENT)
Dept: OUTPATIENT SERVICES | Facility: HOSPITAL | Age: 78
LOS: 1 days | Discharge: HOME | End: 2018-03-13

## 2018-03-13 DIAGNOSIS — C34.90 MALIGNANT NEOPLASM OF UNSPECIFIED PART OF UNSPECIFIED BRONCHUS OR LUNG: ICD-10-CM

## 2018-03-13 DIAGNOSIS — Z98.890 OTHER SPECIFIED POSTPROCEDURAL STATES: Chronic | ICD-10-CM

## 2018-03-13 DIAGNOSIS — Z90.49 ACQUIRED ABSENCE OF OTHER SPECIFIED PARTS OF DIGESTIVE TRACT: Chronic | ICD-10-CM

## 2018-03-15 ENCOUNTER — RESULT REVIEW (OUTPATIENT)
Age: 78
End: 2018-03-15

## 2018-03-15 ENCOUNTER — APPOINTMENT (OUTPATIENT)
Dept: HEMATOLOGY ONCOLOGY | Facility: CLINIC | Age: 78
End: 2018-03-15

## 2018-03-15 ENCOUNTER — APPOINTMENT (OUTPATIENT)
Dept: INFUSION THERAPY | Facility: HOSPITAL | Age: 78
End: 2018-03-15

## 2018-03-15 ENCOUNTER — LABORATORY RESULT (OUTPATIENT)
Age: 78
End: 2018-03-15

## 2018-03-15 LAB
BASOPHILS # BLD AUTO: 0.1 K/UL — SIGNIFICANT CHANGE UP (ref 0–0.2)
BASOPHILS NFR BLD AUTO: 1.6 % — SIGNIFICANT CHANGE UP (ref 0–2)
EOSINOPHIL # BLD AUTO: 0.1 K/UL — SIGNIFICANT CHANGE UP (ref 0–0.5)
EOSINOPHIL NFR BLD AUTO: 1.5 % — SIGNIFICANT CHANGE UP (ref 0–6)
HCT VFR BLD CALC: 32.1 % — LOW (ref 34.5–45)
HGB BLD-MCNC: 11.1 G/DL — LOW (ref 11.5–15.5)
LYMPHOCYTES # BLD AUTO: 2.4 K/UL — SIGNIFICANT CHANGE UP (ref 1–3.3)
LYMPHOCYTES # BLD AUTO: 54.1 % — HIGH (ref 13–44)
MCHC RBC-ENTMCNC: 32.8 PG — SIGNIFICANT CHANGE UP (ref 27–34)
MCHC RBC-ENTMCNC: 34.5 G/DL — SIGNIFICANT CHANGE UP (ref 32–36)
MCV RBC AUTO: 95.1 FL — SIGNIFICANT CHANGE UP (ref 80–100)
MONOCYTES # BLD AUTO: 0.3 K/UL — SIGNIFICANT CHANGE UP (ref 0–0.9)
MONOCYTES NFR BLD AUTO: 7.7 % — SIGNIFICANT CHANGE UP (ref 2–14)
NEUTROPHILS # BLD AUTO: 1.5 K/UL — LOW (ref 1.8–7.4)
NEUTROPHILS NFR BLD AUTO: 35.1 % — LOW (ref 43–77)
PLATELET # BLD AUTO: 257 K/UL — SIGNIFICANT CHANGE UP (ref 150–400)
RBC # BLD: 3.38 M/UL — LOW (ref 3.8–5.2)
RBC # FLD: 12.7 % — SIGNIFICANT CHANGE UP (ref 10.3–14.5)
WBC # BLD: 4.3 K/UL — SIGNIFICANT CHANGE UP (ref 3.8–10.5)
WBC # FLD AUTO: 4.3 K/UL — SIGNIFICANT CHANGE UP (ref 3.8–10.5)

## 2018-03-16 DIAGNOSIS — R11.2 NAUSEA WITH VOMITING, UNSPECIFIED: ICD-10-CM

## 2018-03-16 DIAGNOSIS — Z51.11 ENCOUNTER FOR ANTINEOPLASTIC CHEMOTHERAPY: ICD-10-CM

## 2018-03-16 DIAGNOSIS — Z51.89 ENCOUNTER FOR OTHER SPECIFIED AFTERCARE: ICD-10-CM

## 2018-03-21 ENCOUNTER — RESULT REVIEW (OUTPATIENT)
Age: 78
End: 2018-03-21

## 2018-03-21 ENCOUNTER — APPOINTMENT (OUTPATIENT)
Dept: HEMATOLOGY ONCOLOGY | Facility: CLINIC | Age: 78
End: 2018-03-21
Payer: MEDICARE

## 2018-03-21 VITALS
TEMPERATURE: 98.1 F | RESPIRATION RATE: 16 BRPM | HEART RATE: 82 BPM | OXYGEN SATURATION: 97 % | DIASTOLIC BLOOD PRESSURE: 77 MMHG | SYSTOLIC BLOOD PRESSURE: 115 MMHG | BODY MASS INDEX: 31.41 KG/M2 | WEIGHT: 178.13 LBS

## 2018-03-21 LAB
BASOPHILS # BLD AUTO: 0 K/UL — SIGNIFICANT CHANGE UP (ref 0–0.2)
BASOPHILS NFR BLD AUTO: 1.2 % — SIGNIFICANT CHANGE UP (ref 0–2)
EOSINOPHIL # BLD AUTO: 0.1 K/UL — SIGNIFICANT CHANGE UP (ref 0–0.5)
EOSINOPHIL NFR BLD AUTO: 1.5 % — SIGNIFICANT CHANGE UP (ref 0–6)
HCT VFR BLD CALC: 32.1 % — LOW (ref 34.5–45)
HGB BLD-MCNC: 11.2 G/DL — LOW (ref 11.5–15.5)
LYMPHOCYTES # BLD AUTO: 1.7 K/UL — SIGNIFICANT CHANGE UP (ref 1–3.3)
LYMPHOCYTES # BLD AUTO: 42 % — SIGNIFICANT CHANGE UP (ref 13–44)
MCHC RBC-ENTMCNC: 32.9 PG — SIGNIFICANT CHANGE UP (ref 27–34)
MCHC RBC-ENTMCNC: 34.8 G/DL — SIGNIFICANT CHANGE UP (ref 32–36)
MCV RBC AUTO: 94.6 FL — SIGNIFICANT CHANGE UP (ref 80–100)
MONOCYTES # BLD AUTO: 0.2 K/UL — SIGNIFICANT CHANGE UP (ref 0–0.9)
MONOCYTES NFR BLD AUTO: 4.7 % — SIGNIFICANT CHANGE UP (ref 2–14)
NEUTROPHILS # BLD AUTO: 2.1 K/UL — SIGNIFICANT CHANGE UP (ref 1.8–7.4)
NEUTROPHILS NFR BLD AUTO: 50.5 % — SIGNIFICANT CHANGE UP (ref 43–77)
PLATELET # BLD AUTO: 100 K/UL — LOW (ref 150–400)
RBC # BLD: 3.4 M/UL — LOW (ref 3.8–5.2)
RBC # FLD: 12.6 % — SIGNIFICANT CHANGE UP (ref 10.3–14.5)
WBC # BLD: 4.1 K/UL — SIGNIFICANT CHANGE UP (ref 3.8–10.5)
WBC # FLD AUTO: 4.1 K/UL — SIGNIFICANT CHANGE UP (ref 3.8–10.5)

## 2018-03-21 PROCEDURE — 99214 OFFICE O/P EST MOD 30 MIN: CPT

## 2018-03-28 ENCOUNTER — APPOINTMENT (OUTPATIENT)
Dept: HEMATOLOGY ONCOLOGY | Facility: CLINIC | Age: 78
End: 2018-03-28

## 2018-04-05 ENCOUNTER — APPOINTMENT (OUTPATIENT)
Dept: INFUSION THERAPY | Facility: HOSPITAL | Age: 78
End: 2018-04-05

## 2018-04-05 ENCOUNTER — RESULT REVIEW (OUTPATIENT)
Age: 78
End: 2018-04-05

## 2018-04-05 ENCOUNTER — LABORATORY RESULT (OUTPATIENT)
Age: 78
End: 2018-04-05

## 2018-04-05 LAB
BASOPHILS # BLD AUTO: 0.1 K/UL — SIGNIFICANT CHANGE UP (ref 0–0.2)
BASOPHILS NFR BLD AUTO: 0.9 % — SIGNIFICANT CHANGE UP (ref 0–2)
EOSINOPHIL # BLD AUTO: 0.2 K/UL — SIGNIFICANT CHANGE UP (ref 0–0.5)
EOSINOPHIL NFR BLD AUTO: 2.5 % — SIGNIFICANT CHANGE UP (ref 0–6)
HCT VFR BLD CALC: 28.7 % — LOW (ref 34.5–45)
HGB BLD-MCNC: 10 G/DL — LOW (ref 11.5–15.5)
LYMPHOCYTES # BLD AUTO: 1.9 K/UL — SIGNIFICANT CHANGE UP (ref 1–3.3)
LYMPHOCYTES # BLD AUTO: 28.7 % — SIGNIFICANT CHANGE UP (ref 13–44)
MCHC RBC-ENTMCNC: 33.4 PG — SIGNIFICANT CHANGE UP (ref 27–34)
MCHC RBC-ENTMCNC: 34.7 G/DL — SIGNIFICANT CHANGE UP (ref 32–36)
MCV RBC AUTO: 96.1 FL — SIGNIFICANT CHANGE UP (ref 80–100)
MONOCYTES # BLD AUTO: 0.8 K/UL — SIGNIFICANT CHANGE UP (ref 0–0.9)
MONOCYTES NFR BLD AUTO: 12.5 % — SIGNIFICANT CHANGE UP (ref 2–14)
NEUTROPHILS # BLD AUTO: 3.7 K/UL — SIGNIFICANT CHANGE UP (ref 1.8–7.4)
NEUTROPHILS NFR BLD AUTO: 55.5 % — SIGNIFICANT CHANGE UP (ref 43–77)
PLATELET # BLD AUTO: 442 K/UL — HIGH (ref 150–400)
RBC # BLD: 2.99 M/UL — LOW (ref 3.8–5.2)
RBC # FLD: 13.6 % — SIGNIFICANT CHANGE UP (ref 10.3–14.5)
WBC # BLD: 6.7 K/UL — SIGNIFICANT CHANGE UP (ref 3.8–10.5)
WBC # FLD AUTO: 6.7 K/UL — SIGNIFICANT CHANGE UP (ref 3.8–10.5)

## 2018-04-24 ENCOUNTER — OUTPATIENT (OUTPATIENT)
Dept: OUTPATIENT SERVICES | Facility: HOSPITAL | Age: 78
LOS: 1 days | Discharge: HOME | End: 2018-04-24

## 2018-04-24 DIAGNOSIS — C34.90 MALIGNANT NEOPLASM OF UNSPECIFIED PART OF UNSPECIFIED BRONCHUS OR LUNG: ICD-10-CM

## 2018-04-24 DIAGNOSIS — Z98.890 OTHER SPECIFIED POSTPROCEDURAL STATES: Chronic | ICD-10-CM

## 2018-04-24 DIAGNOSIS — Z90.49 ACQUIRED ABSENCE OF OTHER SPECIFIED PARTS OF DIGESTIVE TRACT: Chronic | ICD-10-CM

## 2018-04-26 ENCOUNTER — RESULT REVIEW (OUTPATIENT)
Age: 78
End: 2018-04-26

## 2018-04-26 ENCOUNTER — APPOINTMENT (OUTPATIENT)
Dept: INFUSION THERAPY | Facility: HOSPITAL | Age: 78
End: 2018-04-26

## 2018-04-26 ENCOUNTER — LABORATORY RESULT (OUTPATIENT)
Age: 78
End: 2018-04-26

## 2018-04-26 LAB
BASOPHILS # BLD AUTO: 0.1 K/UL — SIGNIFICANT CHANGE UP (ref 0–0.2)
BASOPHILS NFR BLD AUTO: 0.9 % — SIGNIFICANT CHANGE UP (ref 0–2)
EOSINOPHIL # BLD AUTO: 0.1 K/UL — SIGNIFICANT CHANGE UP (ref 0–0.5)
EOSINOPHIL NFR BLD AUTO: 2.1 % — SIGNIFICANT CHANGE UP (ref 0–6)
HCT VFR BLD CALC: 27.8 % — LOW (ref 34.5–45)
HGB BLD-MCNC: 9.8 G/DL — LOW (ref 11.5–15.5)
LYMPHOCYTES # BLD AUTO: 2 K/UL — SIGNIFICANT CHANGE UP (ref 1–3.3)
LYMPHOCYTES # BLD AUTO: 28.3 % — SIGNIFICANT CHANGE UP (ref 13–44)
MCHC RBC-ENTMCNC: 34.4 PG — HIGH (ref 27–34)
MCHC RBC-ENTMCNC: 35.1 G/DL — SIGNIFICANT CHANGE UP (ref 32–36)
MCV RBC AUTO: 98.1 FL — SIGNIFICANT CHANGE UP (ref 80–100)
MONOCYTES # BLD AUTO: 0.8 K/UL — SIGNIFICANT CHANGE UP (ref 0–0.9)
MONOCYTES NFR BLD AUTO: 10.7 % — SIGNIFICANT CHANGE UP (ref 2–14)
NEUTROPHILS # BLD AUTO: 4.1 K/UL — SIGNIFICANT CHANGE UP (ref 1.8–7.4)
NEUTROPHILS NFR BLD AUTO: 57.9 % — SIGNIFICANT CHANGE UP (ref 43–77)
PLATELET # BLD AUTO: 272 K/UL — SIGNIFICANT CHANGE UP (ref 150–400)
RBC # BLD: 2.83 M/UL — LOW (ref 3.8–5.2)
RBC # FLD: 16.6 % — HIGH (ref 10.3–14.5)
WBC # BLD: 7 K/UL — SIGNIFICANT CHANGE UP (ref 3.8–10.5)
WBC # FLD AUTO: 7 K/UL — SIGNIFICANT CHANGE UP (ref 3.8–10.5)

## 2018-04-27 DIAGNOSIS — E86.0 DEHYDRATION: ICD-10-CM

## 2018-04-27 DIAGNOSIS — Z51.11 ENCOUNTER FOR ANTINEOPLASTIC CHEMOTHERAPY: ICD-10-CM

## 2018-04-27 DIAGNOSIS — R11.2 NAUSEA WITH VOMITING, UNSPECIFIED: ICD-10-CM

## 2018-04-27 DIAGNOSIS — Z51.89 ENCOUNTER FOR OTHER SPECIFIED AFTERCARE: ICD-10-CM

## 2018-05-01 ENCOUNTER — APPOINTMENT (OUTPATIENT)
Dept: THORACIC SURGERY | Facility: CLINIC | Age: 78
End: 2018-05-01
Payer: MEDICARE

## 2018-05-01 VITALS
SYSTOLIC BLOOD PRESSURE: 134 MMHG | DIASTOLIC BLOOD PRESSURE: 74 MMHG | WEIGHT: 179 LBS | HEART RATE: 95 BPM | BODY MASS INDEX: 30.56 KG/M2 | OXYGEN SATURATION: 99 % | HEIGHT: 64 IN | RESPIRATION RATE: 18 BRPM

## 2018-05-01 PROCEDURE — 99214 OFFICE O/P EST MOD 30 MIN: CPT

## 2018-05-01 RX ORDER — DOCUSATE SODIUM 100 MG/1
100 CAPSULE, LIQUID FILLED ORAL
Refills: 0 | Status: DISCONTINUED | COMMUNITY
End: 2018-05-01

## 2018-05-01 RX ORDER — NITROFURANTOIN (MONOHYDRATE/MACROCRYSTALS) 25; 75 MG/1; MG/1
100 CAPSULE ORAL
Qty: 14 | Refills: 0 | Status: DISCONTINUED | COMMUNITY
Start: 2018-02-05 | End: 2018-05-01

## 2018-05-07 ENCOUNTER — RESULT REVIEW (OUTPATIENT)
Age: 78
End: 2018-05-07

## 2018-05-07 ENCOUNTER — APPOINTMENT (OUTPATIENT)
Dept: HEMATOLOGY ONCOLOGY | Facility: CLINIC | Age: 78
End: 2018-05-07
Payer: MEDICARE

## 2018-05-07 VITALS
SYSTOLIC BLOOD PRESSURE: 109 MMHG | WEIGHT: 176.37 LBS | RESPIRATION RATE: 16 BRPM | HEART RATE: 72 BPM | TEMPERATURE: 97.8 F | OXYGEN SATURATION: 98 % | DIASTOLIC BLOOD PRESSURE: 72 MMHG | BODY MASS INDEX: 30.27 KG/M2

## 2018-05-07 LAB
ALBUMIN SERPL ELPH-MCNC: 4.2 G/DL
ALBUMIN SERPL ELPH-MCNC: 4.2 G/DL
ALP BLD-CCNC: 67 U/L
ALP BLD-CCNC: 75 U/L
ALT SERPL-CCNC: 18 U/L
ALT SERPL-CCNC: 24 U/L
ANION GAP SERPL CALC-SCNC: 12 MMOL/L
ANION GAP SERPL CALC-SCNC: 16 MMOL/L
AST SERPL-CCNC: 20 U/L
AST SERPL-CCNC: 24 U/L
BASOPHILS # BLD AUTO: 0 K/UL — SIGNIFICANT CHANGE UP (ref 0–0.2)
BASOPHILS NFR BLD AUTO: 0.5 % — SIGNIFICANT CHANGE UP (ref 0–2)
BILIRUB SERPL-MCNC: 0.2 MG/DL
BILIRUB SERPL-MCNC: 0.4 MG/DL
BUN SERPL-MCNC: 18 MG/DL
BUN SERPL-MCNC: 18 MG/DL
CALCIUM SERPL-MCNC: 9.3 MG/DL
CALCIUM SERPL-MCNC: 9.4 MG/DL
CHLORIDE SERPL-SCNC: 100 MMOL/L
CHLORIDE SERPL-SCNC: 102 MMOL/L
CO2 SERPL-SCNC: 23 MMOL/L
CO2 SERPL-SCNC: 26 MMOL/L
CREAT SERPL-MCNC: 0.98 MG/DL
CREAT SERPL-MCNC: 1.12 MG/DL
EOSINOPHIL # BLD AUTO: 0.1 K/UL — SIGNIFICANT CHANGE UP (ref 0–0.5)
EOSINOPHIL NFR BLD AUTO: 1.5 % — SIGNIFICANT CHANGE UP (ref 0–6)
FERRITIN SERPL-MCNC: 245 NG/ML
FERRITIN SERPL-MCNC: 779 NG/ML
GLUCOSE SERPL-MCNC: 151 MG/DL
GLUCOSE SERPL-MCNC: 173 MG/DL
HBV CORE IGM SER QL: NONREACTIVE
HBV SURFACE AB SER QL: NONREACTIVE
HBV SURFACE AG SER QL: NONREACTIVE
HCT VFR BLD CALC: 24.1 % — LOW (ref 34.5–45)
HCV AB SER QL: NONREACTIVE
HCV S/CO RATIO: 0.17 S/CO
HGB BLD-MCNC: 8.4 G/DL — LOW (ref 11.5–15.5)
LYMPHOCYTES # BLD AUTO: 2.3 K/UL — SIGNIFICANT CHANGE UP (ref 1–3.3)
LYMPHOCYTES # BLD AUTO: 24.7 % — SIGNIFICANT CHANGE UP (ref 13–44)
MCHC RBC-ENTMCNC: 34.9 PG — HIGH (ref 27–34)
MCHC RBC-ENTMCNC: 35 G/DL — SIGNIFICANT CHANGE UP (ref 32–36)
MCV RBC AUTO: 99.6 FL — SIGNIFICANT CHANGE UP (ref 80–100)
MONOCYTES # BLD AUTO: 0.7 K/UL — SIGNIFICANT CHANGE UP (ref 0–0.9)
MONOCYTES NFR BLD AUTO: 7.7 % — SIGNIFICANT CHANGE UP (ref 2–14)
NEUTROPHILS # BLD AUTO: 6.1 K/UL — SIGNIFICANT CHANGE UP (ref 1.8–7.4)
NEUTROPHILS NFR BLD AUTO: 65.6 % — SIGNIFICANT CHANGE UP (ref 43–77)
PLATELET # BLD AUTO: 82 K/UL — LOW (ref 150–400)
POTASSIUM SERPL-SCNC: 4.5 MMOL/L
POTASSIUM SERPL-SCNC: 4.5 MMOL/L
PROT SERPL-MCNC: 7.4 G/DL
PROT SERPL-MCNC: 7.7 G/DL
RBC # BLD: 2.42 M/UL — LOW (ref 3.8–5.2)
RBC # FLD: 16.8 % — HIGH (ref 10.3–14.5)
SODIUM SERPL-SCNC: 139 MMOL/L
SODIUM SERPL-SCNC: 140 MMOL/L
VIT B12 SERPL-MCNC: 622 PG/ML
WBC # BLD: 9.4 K/UL — SIGNIFICANT CHANGE UP (ref 3.8–10.5)
WBC # FLD AUTO: 9.4 K/UL — SIGNIFICANT CHANGE UP (ref 3.8–10.5)

## 2018-05-07 PROCEDURE — 99214 OFFICE O/P EST MOD 30 MIN: CPT

## 2018-05-10 ENCOUNTER — APPOINTMENT (OUTPATIENT)
Dept: UROLOGY | Facility: CLINIC | Age: 78
End: 2018-05-10
Payer: MEDICARE

## 2018-05-10 VITALS
OXYGEN SATURATION: 97 % | SYSTOLIC BLOOD PRESSURE: 110 MMHG | HEART RATE: 81 BPM | WEIGHT: 178 LBS | DIASTOLIC BLOOD PRESSURE: 69 MMHG | RESPIRATION RATE: 18 BRPM | TEMPERATURE: 98.1 F | BODY MASS INDEX: 30.55 KG/M2

## 2018-05-10 PROCEDURE — 99213 OFFICE O/P EST LOW 20 MIN: CPT

## 2018-05-17 ENCOUNTER — APPOINTMENT (OUTPATIENT)
Dept: INFUSION THERAPY | Facility: HOSPITAL | Age: 78
End: 2018-05-17

## 2018-05-22 ENCOUNTER — OUTPATIENT (OUTPATIENT)
Dept: OUTPATIENT SERVICES | Facility: HOSPITAL | Age: 78
LOS: 1 days | End: 2018-05-22
Payer: MEDICARE

## 2018-05-22 ENCOUNTER — RESULT REVIEW (OUTPATIENT)
Age: 78
End: 2018-05-22

## 2018-05-22 ENCOUNTER — APPOINTMENT (OUTPATIENT)
Dept: HEMATOLOGY ONCOLOGY | Facility: CLINIC | Age: 78
End: 2018-05-22
Payer: MEDICARE

## 2018-05-22 VITALS
TEMPERATURE: 97.9 F | WEIGHT: 177.25 LBS | SYSTOLIC BLOOD PRESSURE: 107 MMHG | HEART RATE: 72 BPM | DIASTOLIC BLOOD PRESSURE: 73 MMHG | BODY MASS INDEX: 30.42 KG/M2 | OXYGEN SATURATION: 98 % | RESPIRATION RATE: 16 BRPM

## 2018-05-22 DIAGNOSIS — D64.9 ANEMIA, UNSPECIFIED: ICD-10-CM

## 2018-05-22 DIAGNOSIS — Z98.890 OTHER SPECIFIED POSTPROCEDURAL STATES: Chronic | ICD-10-CM

## 2018-05-22 DIAGNOSIS — Z90.49 ACQUIRED ABSENCE OF OTHER SPECIFIED PARTS OF DIGESTIVE TRACT: Chronic | ICD-10-CM

## 2018-05-22 LAB
BASOPHILS # BLD AUTO: 0 K/UL — SIGNIFICANT CHANGE UP (ref 0–0.2)
BASOPHILS NFR BLD AUTO: 0.6 % — SIGNIFICANT CHANGE UP (ref 0–2)
BLD GP AB SCN SERPL QL: NEGATIVE — SIGNIFICANT CHANGE UP
EOSINOPHIL # BLD AUTO: 0.1 K/UL — SIGNIFICANT CHANGE UP (ref 0–0.5)
EOSINOPHIL NFR BLD AUTO: 1.5 % — SIGNIFICANT CHANGE UP (ref 0–6)
HCT VFR BLD CALC: 27.7 % — LOW (ref 34.5–45)
HGB BLD-MCNC: 9.6 G/DL — LOW (ref 11.5–15.5)
LYMPHOCYTES # BLD AUTO: 1.8 K/UL — SIGNIFICANT CHANGE UP (ref 1–3.3)
LYMPHOCYTES # BLD AUTO: 34.9 % — SIGNIFICANT CHANGE UP (ref 13–44)
MCHC RBC-ENTMCNC: 34.9 G/DL — SIGNIFICANT CHANGE UP (ref 32–36)
MCHC RBC-ENTMCNC: 37 PG — HIGH (ref 27–34)
MCV RBC AUTO: 106 FL — HIGH (ref 80–100)
MONOCYTES # BLD AUTO: 0.6 K/UL — SIGNIFICANT CHANGE UP (ref 0–0.9)
MONOCYTES NFR BLD AUTO: 11 % — SIGNIFICANT CHANGE UP (ref 2–14)
NEUTROPHILS # BLD AUTO: 2.7 K/UL — SIGNIFICANT CHANGE UP (ref 1.8–7.4)
NEUTROPHILS NFR BLD AUTO: 51.9 % — SIGNIFICANT CHANGE UP (ref 43–77)
PLATELET # BLD AUTO: 197 K/UL — SIGNIFICANT CHANGE UP (ref 150–400)
RBC # BLD: 2.61 M/UL — LOW (ref 3.8–5.2)
RBC # FLD: 18.1 % — HIGH (ref 10.3–14.5)
RH IG SCN BLD-IMP: POSITIVE — SIGNIFICANT CHANGE UP
RH IG SCN BLD-IMP: POSITIVE — SIGNIFICANT CHANGE UP
WBC # BLD: 5.2 K/UL — SIGNIFICANT CHANGE UP (ref 3.8–10.5)
WBC # FLD AUTO: 5.2 K/UL — SIGNIFICANT CHANGE UP (ref 3.8–10.5)

## 2018-05-22 PROCEDURE — 86850 RBC ANTIBODY SCREEN: CPT

## 2018-05-22 PROCEDURE — 86901 BLOOD TYPING SEROLOGIC RH(D): CPT

## 2018-05-22 PROCEDURE — 86900 BLOOD TYPING SEROLOGIC ABO: CPT

## 2018-05-22 PROCEDURE — 99214 OFFICE O/P EST MOD 30 MIN: CPT

## 2018-05-24 ENCOUNTER — APPOINTMENT (OUTPATIENT)
Dept: UROLOGY | Facility: CLINIC | Age: 78
End: 2018-05-24
Payer: MEDICARE

## 2018-05-24 PROCEDURE — 76775 US EXAM ABDO BACK WALL LIM: CPT

## 2018-05-29 LAB
ALBUMIN SERPL ELPH-MCNC: 4.1 G/DL
ALP BLD-CCNC: 77 U/L
ALT SERPL-CCNC: 23 U/L
ANION GAP SERPL CALC-SCNC: 14 MMOL/L
AST SERPL-CCNC: 22 U/L
BILIRUB SERPL-MCNC: 0.3 MG/DL
BUN SERPL-MCNC: 16 MG/DL
CALCIUM SERPL-MCNC: 9.3 MG/DL
CHLORIDE SERPL-SCNC: 105 MMOL/L
CO2 SERPL-SCNC: 22 MMOL/L
CREAT SERPL-MCNC: 0.92 MG/DL
GLUCOSE SERPL-MCNC: 133 MG/DL
POTASSIUM SERPL-SCNC: 4.4 MMOL/L
PROT SERPL-MCNC: 6.9 G/DL
SODIUM SERPL-SCNC: 141 MMOL/L

## 2018-06-18 ENCOUNTER — RECORD ABSTRACTING (OUTPATIENT)
Age: 78
End: 2018-06-18

## 2018-06-28 ENCOUNTER — APPOINTMENT (OUTPATIENT)
Dept: PULMONOLOGY | Facility: CLINIC | Age: 78
End: 2018-06-28
Payer: MEDICARE

## 2018-06-28 VITALS
HEIGHT: 64 IN | OXYGEN SATURATION: 95 % | RESPIRATION RATE: 17 BRPM | HEART RATE: 82 BPM | WEIGHT: 174 LBS | BODY MASS INDEX: 29.71 KG/M2 | DIASTOLIC BLOOD PRESSURE: 68 MMHG | SYSTOLIC BLOOD PRESSURE: 111 MMHG

## 2018-06-28 PROCEDURE — 99213 OFFICE O/P EST LOW 20 MIN: CPT | Mod: 25

## 2018-06-28 PROCEDURE — 94060 EVALUATION OF WHEEZING: CPT

## 2018-07-16 PROBLEM — N20.0 CALCULUS OF KIDNEY: Chronic | Status: ACTIVE | Noted: 2018-01-04

## 2018-07-16 PROBLEM — R91.8 OTHER NONSPECIFIC ABNORMAL FINDING OF LUNG FIELD: Chronic | Status: INACTIVE | Noted: 2018-01-04 | Resolved: 2018-02-21

## 2018-07-23 PROBLEM — E55.9 VITAMIN D DEFICIENCY, UNSPECIFIED: Chronic | Status: ACTIVE | Noted: 2018-01-04

## 2018-07-23 PROBLEM — E66.9 OBESITY, UNSPECIFIED: Chronic | Status: ACTIVE | Noted: 2018-01-04

## 2018-07-23 PROBLEM — E03.9 HYPOTHYROIDISM, UNSPECIFIED: Chronic | Status: ACTIVE | Noted: 2018-01-04

## 2018-07-23 PROBLEM — J30.2 OTHER SEASONAL ALLERGIC RHINITIS: Chronic | Status: ACTIVE | Noted: 2018-01-04

## 2018-07-23 PROBLEM — M54.30 SCIATICA, UNSPECIFIED SIDE: Chronic | Status: ACTIVE | Noted: 2018-01-04

## 2018-07-23 PROBLEM — I48.0 PAROXYSMAL ATRIAL FIBRILLATION: Chronic | Status: ACTIVE | Noted: 2018-02-21

## 2018-07-23 PROBLEM — E78.00 PURE HYPERCHOLESTEROLEMIA, UNSPECIFIED: Chronic | Status: ACTIVE | Noted: 2018-01-04

## 2018-07-23 PROBLEM — R06.83 SNORING: Chronic | Status: ACTIVE | Noted: 2018-01-04

## 2018-07-23 PROBLEM — Z78.9 OTHER SPECIFIED HEALTH STATUS: Chronic | Status: ACTIVE | Noted: 2018-01-04

## 2018-07-23 PROBLEM — G47.00 INSOMNIA, UNSPECIFIED: Chronic | Status: ACTIVE | Noted: 2018-01-04

## 2018-07-23 PROBLEM — M19.90 UNSPECIFIED OSTEOARTHRITIS, UNSPECIFIED SITE: Chronic | Status: ACTIVE | Noted: 2018-01-04

## 2018-07-23 PROBLEM — I10 ESSENTIAL (PRIMARY) HYPERTENSION: Chronic | Status: ACTIVE | Noted: 2018-01-04

## 2018-08-06 ENCOUNTER — OUTPATIENT (OUTPATIENT)
Dept: OUTPATIENT SERVICES | Facility: HOSPITAL | Age: 78
LOS: 1 days | Discharge: HOME | End: 2018-08-06

## 2018-08-06 DIAGNOSIS — Z90.49 ACQUIRED ABSENCE OF OTHER SPECIFIED PARTS OF DIGESTIVE TRACT: Chronic | ICD-10-CM

## 2018-08-06 DIAGNOSIS — Z98.890 OTHER SPECIFIED POSTPROCEDURAL STATES: Chronic | ICD-10-CM

## 2018-08-06 DIAGNOSIS — C34.90 MALIGNANT NEOPLASM OF UNSPECIFIED PART OF UNSPECIFIED BRONCHUS OR LUNG: ICD-10-CM

## 2018-08-07 ENCOUNTER — APPOINTMENT (OUTPATIENT)
Dept: THORACIC SURGERY | Facility: CLINIC | Age: 78
End: 2018-08-07
Payer: MEDICARE

## 2018-08-07 VITALS
OXYGEN SATURATION: 97 % | SYSTOLIC BLOOD PRESSURE: 117 MMHG | BODY MASS INDEX: 29.88 KG/M2 | HEART RATE: 65 BPM | RESPIRATION RATE: 18 BRPM | HEIGHT: 64 IN | WEIGHT: 175 LBS | TEMPERATURE: 97.5 F | DIASTOLIC BLOOD PRESSURE: 77 MMHG

## 2018-08-07 PROCEDURE — 99213 OFFICE O/P EST LOW 20 MIN: CPT

## 2018-08-15 ENCOUNTER — APPOINTMENT (OUTPATIENT)
Dept: HEMATOLOGY ONCOLOGY | Facility: CLINIC | Age: 78
End: 2018-08-15
Payer: MEDICARE

## 2018-08-15 ENCOUNTER — RESULT REVIEW (OUTPATIENT)
Age: 78
End: 2018-08-15

## 2018-08-15 VITALS
RESPIRATION RATE: 16 BRPM | TEMPERATURE: 97.7 F | WEIGHT: 175.71 LBS | SYSTOLIC BLOOD PRESSURE: 113 MMHG | BODY MASS INDEX: 30.16 KG/M2 | HEART RATE: 84 BPM | DIASTOLIC BLOOD PRESSURE: 73 MMHG | OXYGEN SATURATION: 96 %

## 2018-08-15 DIAGNOSIS — Z00.00 ENCOUNTER FOR GENERAL ADULT MEDICAL EXAMINATION W/OUT ABNORMAL FINDINGS: ICD-10-CM

## 2018-08-15 DIAGNOSIS — D64.9 ANEMIA, UNSPECIFIED: ICD-10-CM

## 2018-08-15 LAB
BASOPHILS # BLD AUTO: 0 K/UL — SIGNIFICANT CHANGE UP (ref 0–0.2)
BASOPHILS NFR BLD AUTO: 0.5 % — SIGNIFICANT CHANGE UP (ref 0–2)
DEPRECATED KAPPA LC FREE/LAMBDA SER: 0.94 RATIO
EOSINOPHIL # BLD AUTO: 0.1 K/UL — SIGNIFICANT CHANGE UP (ref 0–0.5)
EOSINOPHIL NFR BLD AUTO: 1.8 % — SIGNIFICANT CHANGE UP (ref 0–6)
HCT VFR BLD CALC: 35.3 % — SIGNIFICANT CHANGE UP (ref 34.5–45)
HGB BLD-MCNC: 11.8 G/DL — SIGNIFICANT CHANGE UP (ref 11.5–15.5)
KAPPA LC CSF-MCNC: 1.91 MG/DL
KAPPA LC SERPL-MCNC: 1.79 MG/DL
LYMPHOCYTES # BLD AUTO: 2 K/UL — SIGNIFICANT CHANGE UP (ref 1–3.3)
LYMPHOCYTES # BLD AUTO: 49.4 % — HIGH (ref 13–44)
MCHC RBC-ENTMCNC: 33.4 G/DL — SIGNIFICANT CHANGE UP (ref 32–36)
MCHC RBC-ENTMCNC: 33.5 PG — SIGNIFICANT CHANGE UP (ref 27–34)
MCV RBC AUTO: 100 FL — SIGNIFICANT CHANGE UP (ref 80–100)
MONOCYTES # BLD AUTO: 0.4 K/UL — SIGNIFICANT CHANGE UP (ref 0–0.9)
MONOCYTES NFR BLD AUTO: 8.9 % — SIGNIFICANT CHANGE UP (ref 2–14)
NEUTROPHILS # BLD AUTO: 1.6 K/UL — LOW (ref 1.8–7.4)
NEUTROPHILS NFR BLD AUTO: 39.3 % — LOW (ref 43–77)
PLATELET # BLD AUTO: 145 K/UL — LOW (ref 150–400)
RBC # BLD: 3.53 M/UL — LOW (ref 3.8–5.2)
RBC # FLD: 12.1 % — SIGNIFICANT CHANGE UP (ref 10.3–14.5)
WBC # BLD: 4.1 K/UL — SIGNIFICANT CHANGE UP (ref 3.8–10.5)
WBC # FLD AUTO: 4.1 K/UL — SIGNIFICANT CHANGE UP (ref 3.8–10.5)

## 2018-08-15 PROCEDURE — 99214 OFFICE O/P EST MOD 30 MIN: CPT

## 2018-08-16 LAB
ALBUMIN MFR SERPL ELPH: 56.2 %
ALBUMIN SERPL-MCNC: 4.2 G/DL
ALBUMIN/GLOB SERPL: 1.3 RATIO
ALPHA1 GLOB MFR SERPL ELPH: 3.2 %
ALPHA1 GLOB SERPL ELPH-MCNC: 0.2 G/DL
ALPHA2 GLOB MFR SERPL ELPH: 11 %
ALPHA2 GLOB SERPL ELPH-MCNC: 0.8 G/DL
B-GLOBULIN MFR SERPL ELPH: 11.2 %
B-GLOBULIN SERPL ELPH-MCNC: 0.8 G/DL
DEPRECATED KAPPA LC FREE/LAMBDA SER: 0.94 RATIO
EPO SERPL-MCNC: 16.1 MIU/ML
GAMMA GLOB FLD ELPH-MCNC: 1.4 G/DL
GAMMA GLOB MFR SERPL ELPH: 18.4 %
IGA SER QL IEP: 193 MG/DL
IGG SER QL IEP: 1561 MG/DL
IGM SER QL IEP: 102 MG/DL
INTERPRETATION SERPL IEP-IMP: NORMAL
KAPPA LC CSF-MCNC: 1.91 MG/DL
KAPPA LC SERPL-MCNC: 1.79 MG/DL
M PROTEIN SPEC IFE-MCNC: NORMAL
PROT SERPL-MCNC: 7.5 G/DL
PROT SERPL-MCNC: 7.5 G/DL

## 2018-08-24 ENCOUNTER — CHART COPY (OUTPATIENT)
Age: 78
End: 2018-08-24

## 2018-08-30 LAB
FOLATE SERPL-MCNC: 11.8 NG/ML
HCYS SERPL-MCNC: 9.3 UMOL/L
METHYLMALONATE SERPL-SCNC: 138 NMOL/L
VIT B12 SERPL-MCNC: 464 PG/ML

## 2018-09-06 ENCOUNTER — RESULT REVIEW (OUTPATIENT)
Age: 78
End: 2018-09-06

## 2018-09-20 ENCOUNTER — APPOINTMENT (OUTPATIENT)
Dept: PULMONOLOGY | Facility: CLINIC | Age: 78
End: 2018-09-20

## 2018-09-20 ENCOUNTER — RECORD ABSTRACTING (OUTPATIENT)
Age: 78
End: 2018-09-20

## 2018-09-20 DIAGNOSIS — Z92.21 PERSONAL HISTORY OF ANTINEOPLASTIC CHEMOTHERAPY: ICD-10-CM

## 2018-09-27 ENCOUNTER — APPOINTMENT (OUTPATIENT)
Dept: PULMONOLOGY | Facility: CLINIC | Age: 78
End: 2018-09-27
Payer: MEDICARE

## 2018-09-27 VITALS
OXYGEN SATURATION: 97 % | HEIGHT: 64 IN | SYSTOLIC BLOOD PRESSURE: 117 MMHG | TEMPERATURE: 98.6 F | WEIGHT: 175 LBS | HEART RATE: 68 BPM | RESPIRATION RATE: 16 BRPM | DIASTOLIC BLOOD PRESSURE: 76 MMHG | BODY MASS INDEX: 29.88 KG/M2

## 2018-09-27 PROCEDURE — 94060 EVALUATION OF WHEEZING: CPT

## 2018-09-27 PROCEDURE — 90662 IIV NO PRSV INCREASED AG IM: CPT

## 2018-09-27 PROCEDURE — G0008: CPT

## 2018-09-27 PROCEDURE — 99213 OFFICE O/P EST LOW 20 MIN: CPT | Mod: 25

## 2018-11-08 ENCOUNTER — APPOINTMENT (OUTPATIENT)
Dept: CT IMAGING | Facility: IMAGING CENTER | Age: 78
End: 2018-11-08

## 2018-11-09 ENCOUNTER — APPOINTMENT (OUTPATIENT)
Dept: HEMATOLOGY ONCOLOGY | Facility: CLINIC | Age: 78
End: 2018-11-09
Payer: MEDICARE

## 2018-11-09 ENCOUNTER — OUTPATIENT (OUTPATIENT)
Dept: OUTPATIENT SERVICES | Facility: HOSPITAL | Age: 78
LOS: 1 days | Discharge: HOME | End: 2018-11-09

## 2018-11-09 VITALS
OXYGEN SATURATION: 96 % | TEMPERATURE: 97.9 F | SYSTOLIC BLOOD PRESSURE: 118 MMHG | WEIGHT: 176.5 LBS | RESPIRATION RATE: 16 BRPM | DIASTOLIC BLOOD PRESSURE: 72 MMHG | BODY MASS INDEX: 30.3 KG/M2 | HEART RATE: 83 BPM

## 2018-11-09 DIAGNOSIS — Z90.49 ACQUIRED ABSENCE OF OTHER SPECIFIED PARTS OF DIGESTIVE TRACT: Chronic | ICD-10-CM

## 2018-11-09 DIAGNOSIS — Z98.890 OTHER SPECIFIED POSTPROCEDURAL STATES: Chronic | ICD-10-CM

## 2018-11-09 DIAGNOSIS — C34.90 MALIGNANT NEOPLASM OF UNSPECIFIED PART OF UNSPECIFIED BRONCHUS OR LUNG: ICD-10-CM

## 2018-11-09 PROCEDURE — 99214 OFFICE O/P EST MOD 30 MIN: CPT

## 2018-11-13 ENCOUNTER — APPOINTMENT (OUTPATIENT)
Dept: THORACIC SURGERY | Facility: CLINIC | Age: 78
End: 2018-11-13
Payer: MEDICARE

## 2018-11-13 VITALS
OXYGEN SATURATION: 95 % | WEIGHT: 176 LBS | SYSTOLIC BLOOD PRESSURE: 112 MMHG | DIASTOLIC BLOOD PRESSURE: 74 MMHG | RESPIRATION RATE: 15 BRPM | HEART RATE: 71 BPM | HEIGHT: 64 IN | BODY MASS INDEX: 30.05 KG/M2 | TEMPERATURE: 97.8 F

## 2018-11-13 DIAGNOSIS — Z85.118 ENCOUNTER FOR FOLLOW-UP EXAMINATION AFTER COMPLETED TREATMENT FOR MALIGNANT NEOPLASM: ICD-10-CM

## 2018-11-13 DIAGNOSIS — Z08 ENCOUNTER FOR FOLLOW-UP EXAMINATION AFTER COMPLETED TREATMENT FOR MALIGNANT NEOPLASM: ICD-10-CM

## 2018-11-13 PROCEDURE — 99213 OFFICE O/P EST LOW 20 MIN: CPT

## 2019-02-21 ENCOUNTER — APPOINTMENT (OUTPATIENT)
Dept: PULMONOLOGY | Facility: CLINIC | Age: 79
End: 2019-02-21
Payer: MEDICARE

## 2019-02-21 PROCEDURE — 99213 OFFICE O/P EST LOW 20 MIN: CPT

## 2019-02-23 NOTE — REASON FOR VISIT
[Follow-Up] : a follow-up visit [Asthma] : asthma [Cough] : cough [FreeTextEntry2] : complains of cough at nights

## 2019-03-07 ENCOUNTER — APPOINTMENT (OUTPATIENT)
Dept: PULMONOLOGY | Facility: CLINIC | Age: 79
End: 2019-03-07

## 2019-03-12 ENCOUNTER — APPOINTMENT (OUTPATIENT)
Dept: INTERVENTIONAL RADIOLOGY/VASCULAR | Facility: CLINIC | Age: 79
End: 2019-03-12
Payer: MEDICARE

## 2019-03-12 ENCOUNTER — APPOINTMENT (OUTPATIENT)
Dept: THORACIC SURGERY | Facility: CLINIC | Age: 79
End: 2019-03-12
Payer: MEDICARE

## 2019-03-12 VITALS
WEIGHT: 176 LBS | OXYGEN SATURATION: 95 % | TEMPERATURE: 97.7 F | BODY MASS INDEX: 30.05 KG/M2 | DIASTOLIC BLOOD PRESSURE: 75 MMHG | HEART RATE: 70 BPM | SYSTOLIC BLOOD PRESSURE: 116 MMHG | RESPIRATION RATE: 16 BRPM | HEIGHT: 64 IN

## 2019-03-12 VITALS
HEART RATE: 76 BPM | RESPIRATION RATE: 18 BRPM | OXYGEN SATURATION: 96 % | BODY MASS INDEX: 29.32 KG/M2 | DIASTOLIC BLOOD PRESSURE: 85 MMHG | WEIGHT: 176 LBS | SYSTOLIC BLOOD PRESSURE: 135 MMHG | HEIGHT: 65 IN

## 2019-03-12 PROCEDURE — 99204 OFFICE O/P NEW MOD 45 MIN: CPT

## 2019-03-12 PROCEDURE — 99214 OFFICE O/P EST MOD 30 MIN: CPT

## 2019-03-12 RX ORDER — AZELASTINE HYDROCHLORIDE 137 UG/1
0.1 SPRAY, METERED NASAL TWICE DAILY
Qty: 3 | Refills: 5 | Status: COMPLETED | COMMUNITY
Start: 2019-02-21 | End: 2019-03-12

## 2019-03-12 RX ORDER — METOCLOPRAMIDE 10 MG/1
10 TABLET ORAL EVERY 6 HOURS
Qty: 60 | Refills: 3 | Status: COMPLETED | COMMUNITY
Start: 2018-02-13 | End: 2019-03-12

## 2019-03-12 RX ORDER — METHYLPREDNISOLONE 4 MG/1
4 TABLET ORAL
Refills: 0 | Status: COMPLETED | COMMUNITY
End: 2019-03-12

## 2019-03-12 RX ORDER — FERROUS SULFATE 325(65) MG
325 (65 FE) TABLET ORAL
Refills: 0 | Status: COMPLETED | COMMUNITY
End: 2019-03-12

## 2019-03-12 RX ORDER — LEVOTHYROXINE SODIUM 75 UG/1
75 TABLET ORAL
Refills: 0 | Status: ACTIVE | COMMUNITY

## 2019-03-12 RX ORDER — AZELASTINE HYDROCHLORIDE AND FLUTICASONE PROPIONATE 137; 50 UG/1; UG/1
137-50 SPRAY, METERED NASAL
Refills: 0 | Status: COMPLETED | COMMUNITY
End: 2019-03-12

## 2019-03-12 NOTE — REVIEW OF SYSTEMS
[Eyesight Problems] : eyesight problems [Fever] : no fever [Chills] : no chills [Loss Of Hearing] : no hearing loss [Chest Pain] : no chest pain [Palpitations] : no palpitations [Shortness Of Breath] : no shortness of breath [Abdominal Pain] : no abdominal pain [Easy Bleeding] : no tendency for easy bleeding [Easy Bruising] : no tendency for easy bruising

## 2019-03-12 NOTE — ASSESSMENT
[FreeTextEntry1] : PET/CT reviewed with the patient and her  and daughter.  Patient's daughter interpreted per patient's request.  Plan is for CT guided core needle biopsy of the PET avid right middle lobe mass with anesthesia. Risks of percutaneous biopsy, including but not limited to bleeding, infection, pneumothorax requiring chest tube,  and injury to surrounding structure, discussed with the patient.  All questions were answered.  Patient elects to proceed with the biopsy.

## 2019-03-12 NOTE — PHYSICAL EXAM
[Alert] : alert [Normal Sclera/Conjunctiva] : normal sclera/conjunctiva [Normal Hearing] : hearing was normal [No Respiratory Distress] : no respiratory distress [Normal Gait] : normal gait [No Tremors] : no tremors [Oriented x3] : oriented to person, place, and time

## 2019-03-15 NOTE — ASSESSMENT
[FreeTextEntry1] : 79 year old female s/p flex bronch, uniportal right VATS, right upper lobectomy with en bloc wedge resection of RML, and MLND on 1/10/18.  Pathology revealed RUL adenocarcinoma, involving RML, predominantly solid with minor acinar component, 1.2 x 0.8 x 0.5 cm, all LN's negative + visceral pleura and lymph vascular invasion, [id number], Stage 1B.   She had completed chemotherapy with Dr. Davis (Oncology).  \par \par During her previous visit of November 13, 2018 she was advised to return in 3-4 months with a repeat chest CT. Chest CT on 10/30/18 demonstrated stability of the 7mm MANDEEP nodule partially calcified and 2 mm LLL nodule in the superior segment.  There is no clinical or radiographic evidence of recurrent disease.\par \par She presents today for follow up.  \par \par CT chest of 2/27/19:  \par -  New soft tissue changes in the RML which are adjacent to postsurgical change and the right heart border.  Differential diagnosis includes atelectasis and/or recurrent neoplasm, measuring up to approximately 3.2 x 2.2 cm.   Partially calcified MANDEEP nodule, stable from previous.\par \par PetCT of 3/8/19:\par -   A few active right axillary nonenlarged lymph nodes (SUV max 2.5).\par -   Stable round nodular soft tissue changes in the right middle lobe abutting the pericardium.  30mm rounded nodule on CT image 58 measures up to SUV max 4.1.  A left upper lobe partially calcified nodule is stable.  \par -  Few small areas of subsegmental atelectasis and/or scarring in both lungs.\par -  No evidence of pleural effusion, pericardial effusion or significant lymphadenopathy in the chest.  \par \par She states she is scheduled to see Dr. Ilya Alaniz on May 1, 2019 who has taken over for her past medical oncologist, Dr. Davis.  \par     \par I have reviewed the patient's medical records and diagnostic images during the time of this office visit, and I have made the following recommendation:\par \par -  Will schedule IR consultation today for proposed interventional radiology biopsy of new soft tissue changes in the right middle lobe seen on chest CT of 2/27/18 as well as PET CT of 3/8/19 demonstrating an SUV of 4.1; suspicious for recurrent malignancy.  \par -  Follow up with me after IR procedure for plan.  \par  \par I discussed with the patient at length the recent imaging results. She verbalized understanding and is in agreement with the above treatment plan.\par \par Written by Viviana Peter NP, acting as a scribe for Javad Corbin MD.\par The documentation recorded by the scribe accurately reflects the service I personally performed and the decisions made by me. Javad Corbin MD\par \par

## 2019-03-15 NOTE — CONSULT LETTER
[Dear  ___] : Dear  [unfilled], [Courtesy Letter:] : I had the pleasure of seeing your patient, [unfilled], in my office today. [Please see my note below.] : Please see my note below. [Sincerely,] : Sincerely, [FreeTextEntry2] : -  Ilya Alaniz MD (Med Onc)\par -  Nichelle Mitchell DO (Pulm)\par -  Nick Caba (PCP)\par  [FreeTextEntry3] : \par Javad Corbin MD, FACS \par Chief, Division of Thoracic Surgery \par Director, Minimally Invasive Thoracic Surgery \par Department of Cardiovascular and Thoracic Surgery \par [facility] \par , Cardiovascular and Thoracic Surgery \par [facility] [employer] [facility]\par \par

## 2019-03-15 NOTE — PHYSICAL EXAM
[Sclera] : the sclera and conjunctiva were normal [Neck Appearance] : the appearance of the neck was normal [] : no respiratory distress [Respiration, Rhythm And Depth] : normal respiratory rhythm and effort [Exaggerated Use Of Accessory Muscles For Inspiration] : no accessory muscle use [Auscultation Breath Sounds / Voice Sounds] : lungs were clear to auscultation bilaterally [Heart Rate And Rhythm] : heart rate was normal and rhythm regular [Heart Sounds] : normal S1 and S2 [Heart Sounds Gallop] : no gallops [Examination Of The Chest] : the chest was normal in appearance [2+] : left 2+ [Abdomen Soft] : soft [Cervical Lymph Nodes Enlarged Posterior Bilaterally] : posterior cervical [No CVA Tenderness] : no ~M costovertebral angle tenderness [Involuntary Movements] : no involuntary movements were seen [Skin Color & Pigmentation] : normal skin color and pigmentation [No Focal Deficits] : no focal deficits [Oriented To Time, Place, And Person] : oriented to person, place, and time [Affect] : the affect was normal [Mood] : the mood was normal [FreeTextEntry1] : deferred

## 2019-03-15 NOTE — HISTORY OF PRESENT ILLNESS
[FreeTextEntry1] : \par \par 79 year old female s/p flex bronch, uniportal right VATS, right upper lobectomy with en bloc wedge resection of RML, and MLND on 1/10/18.  Pathology revealed RUL adenocarcinoma, involving RML, predominantly solid with minor acinar component, 1.2 x 0.8 x 0.5 cm, all LN's negative + visceral pleura and lymph vascular invasion, [id number], Stage 1B.   She had completed chemotherapy with Dr. Davis (Oncology).  \par \par During her previous visit of November 13, 2018 she was advised to return in 3-4 months with a repeat chest CT. Chest CT on 10/30/18 demonstrated stability of the 7mm MANDEEP nodule partially calcified and 2 mm LLL nodule in the superior segment.  There is no clinical or radiographic evidence of recurrent disease.\par \par She presents today for follow up.  \par \par CT chest of 2/27/19:  \par -  New soft tissue changes in the RML which are adjacent to postsurgical change and the right heart border.  Differential diagnosis includes atelectasis and/or recurrent neoplasm, measuring up to approximately 3.2 x 2.2 cm.   Partially calcified MANDEEP nodule, stable from previous.\par \par PetCT of 3/8/19:\par -   A few active right axillary nonenlarged lymph nodes (SUV max 2.5).\par -   Stable round nodular soft tissue changes in the right middle lobe abutting the pericardium.  30mm rounded nodule on CT image 58 measures up to SUV max 4.1.  A left upper lobe partially calcified nodule is stable.  \par -  Few small areas of subsegmental atelectasis and/or scarring in both lungs.\par -  No evidence of pleural effusion, pericardial effusion or significant lymphadenopathy in the chest.  \par \par She denies fever, chills, night sweats, weight loss, lightheadeness, dizziness, syncope, nausea, vomiting or diarrhea.  \par \par She states she is scheduled to see Dr. Ilya Alaniz on May 1, 2019 who has taken over for her past medical oncologist, Dr. Davis.  \par

## 2019-03-21 ENCOUNTER — OUTPATIENT (OUTPATIENT)
Dept: OUTPATIENT SERVICES | Facility: HOSPITAL | Age: 79
LOS: 1 days | End: 2019-03-21
Payer: MEDICARE

## 2019-03-21 ENCOUNTER — RESULT REVIEW (OUTPATIENT)
Age: 79
End: 2019-03-21

## 2019-03-21 DIAGNOSIS — R91.1 SOLITARY PULMONARY NODULE: ICD-10-CM

## 2019-03-21 DIAGNOSIS — Z98.890 OTHER SPECIFIED POSTPROCEDURAL STATES: Chronic | ICD-10-CM

## 2019-03-21 DIAGNOSIS — Z90.49 ACQUIRED ABSENCE OF OTHER SPECIFIED PARTS OF DIGESTIVE TRACT: Chronic | ICD-10-CM

## 2019-03-21 LAB
GLUCOSE BLDC GLUCOMTR-MCNC: 148 MG/DL — HIGH (ref 70–99)
GLUCOSE BLDC GLUCOMTR-MCNC: 98 MG/DL — SIGNIFICANT CHANGE UP (ref 70–99)
GRAM STN WND: SIGNIFICANT CHANGE UP
SPECIMEN SOURCE: SIGNIFICANT CHANGE UP

## 2019-03-21 PROCEDURE — 88305 TISSUE EXAM BY PATHOLOGIST: CPT | Mod: 26

## 2019-03-21 PROCEDURE — 32405: CPT

## 2019-03-21 PROCEDURE — 71045 X-RAY EXAM CHEST 1 VIEW: CPT | Mod: 26

## 2019-03-21 PROCEDURE — 88173 CYTOPATH EVAL FNA REPORT: CPT | Mod: 26

## 2019-03-21 PROCEDURE — 77012 CT SCAN FOR NEEDLE BIOPSY: CPT | Mod: 26

## 2019-03-22 LAB
CULTURE - ACID FAST SMEAR CONCENTRATED: SIGNIFICANT CHANGE UP
NON-GYNECOLOGICAL CYTOLOGY STUDY: SIGNIFICANT CHANGE UP
SPECIMEN SOURCE: SIGNIFICANT CHANGE UP

## 2019-03-25 DIAGNOSIS — R91.8 OTHER NONSPECIFIC ABNORMAL FINDING OF LUNG FIELD: ICD-10-CM

## 2019-03-26 LAB — SPECIMEN SOURCE: SIGNIFICANT CHANGE UP

## 2019-03-27 LAB — CULTURE - SURGICAL SITE: SIGNIFICANT CHANGE UP

## 2019-03-28 LAB — SPECIMEN SOURCE: SIGNIFICANT CHANGE UP

## 2019-04-02 ENCOUNTER — APPOINTMENT (OUTPATIENT)
Dept: THORACIC SURGERY | Facility: CLINIC | Age: 79
End: 2019-04-02
Payer: MEDICARE

## 2019-04-02 VITALS
BODY MASS INDEX: 29.29 KG/M2 | WEIGHT: 176 LBS | DIASTOLIC BLOOD PRESSURE: 74 MMHG | HEART RATE: 71 BPM | SYSTOLIC BLOOD PRESSURE: 111 MMHG | TEMPERATURE: 97.8 F | RESPIRATION RATE: 18 BRPM | OXYGEN SATURATION: 98 %

## 2019-04-02 PROCEDURE — 99214 OFFICE O/P EST MOD 30 MIN: CPT

## 2019-04-02 RX ORDER — FLUTICASONE PROPIONATE 50 UG/1
50 SPRAY, METERED NASAL
Qty: 16 | Refills: 0 | Status: DISCONTINUED | COMMUNITY
End: 2019-04-02

## 2019-04-08 NOTE — PHYSICAL EXAM
[Sclera] : the sclera and conjunctiva were normal [PERRL With Normal Accommodation] : pupils were equal in size, round, and reactive to light [Neck Appearance] : the appearance of the neck was normal [Neck Cervical Mass (___cm)] : no neck mass was observed [Respiration, Rhythm And Depth] : normal respiratory rhythm and effort [Auscultation Breath Sounds / Voice Sounds] : lungs were clear to auscultation bilaterally [Heart Rate And Rhythm] : heart rate was normal and rhythm regular [Heart Sounds] : normal S1 and S2 [Examination Of The Chest] : the chest was normal in appearance [2+] : left 2+ [No Abnormalities] : the abdominal aorta was not enlarged and no bruit was heard [No Pulse Delay] : no pulse delay [Bowel Sounds] : normal bowel sounds [Abdomen Soft] : soft [Abdomen Tenderness] : non-tender [Cervical Lymph Nodes Enlarged Posterior Bilaterally] : posterior cervical [Cervical Lymph Nodes Enlarged Anterior Bilaterally] : anterior cervical [No CVA Tenderness] : no ~M costovertebral angle tenderness [Abnormal Walk] : normal gait [Involuntary Movements] : no involuntary movements were seen [Skin Color & Pigmentation] : normal skin color and pigmentation [Skin Turgor] : normal skin turgor [] : no rash [No Focal Deficits] : no focal deficits [Oriented To Time, Place, And Person] : oriented to person, place, and time [Affect] : the affect was normal [Mood] : the mood was normal [Fingers] :  capillary refill of the fingers was normal [FreeTextEntry1] : deferred

## 2019-04-08 NOTE — ASSESSMENT
[FreeTextEntry1] : 79 year old female s/p flex bronch, uniportal right VATS, right upper lobectomy with en bloc wedge resection of RML, and MLND on 1/10/18. Pathology revealed RUL adenocarcinoma, involving RML, predominantly solid with minor acinar component, 1.2 x 0.8 x 0.5 cm, all LN's negative + visceral pleura and lymph vascular invasion, G3, pT2aN0, Stage 1B. She had completed chemotherapy with Dr. Davis (Oncology). \par \par Recent surveillance scan revealed a RML mass, CT guided core biopsy of Rt lung on 3/21/19 with negative pathology. \par \par I have reviewed the patient's medical records and diagnostic images at the time of this office consultation and have made the following recommendation.\par Plan:\par 1. Cipro 750mg, po. take 1 tab, Bid for 14 days. \par 2. Repeat CT chest w/o contrast in 3 months. \par 3. RTC in 3 months with CT. \par \par \par \par Written by Margo Stout NP, acting as a scribe for Dr. Javad Corbin.      \par “The documentation recorded by the scribe accurately reflects the service I personally performed and the decisions made by me.” Javad Corbin MD.\par \par \par \par \par

## 2019-04-08 NOTE — CONSULT LETTER
[Dear  ___] : Dear  [unfilled], [Courtesy Letter:] : I had the pleasure of seeing your patient, [unfilled], in my office today. [Please see my note below.] : Please see my note below. [Sincerely,] : Sincerely, [FreeTextEntry2] : - Ilya Alaniz MD (Med Onc)\par - Brigitte Mitchell DO (Pulm)\par - Nick Caba (PCP)\par   [FreeTextEntry3] : Javad Corbin MD, FACS \par Chief, Division of Thoracic Surgery \par Director, Minimally Invasive Thoracic Surgery \par Department of Cardiovascular and Thoracic Surgery \par [facility] \par , Cardiovascular and Thoracic Surgery \par [facility] [employer] [facility] at [facility]\par

## 2019-04-08 NOTE — HISTORY OF PRESENT ILLNESS
[FreeTextEntry1] : 79 year old female s/p flex bronch, uniportal right VATS, right upper lobectomy with en bloc wedge resection of RML, and MLND on 1/10/18. Pathology revealed RUL adenocarcinoma, involving RML, predominantly solid with minor acinar component, 1.2 x 0.8 x 0.5 cm, all LN's negative + visceral pleura and lymph vascular invasion, G3, pT2aN0, Stage 1B. She had completed chemotherapy with Dr. Davis (Oncology). \par \par During her previous visit of November 13, 2018 she was advised to return in 3-4 months with a repeat chest CT. Chest CT on 10/30/18 demonstrated stability of the 7mm MANDEEP nodule partially calcified and 2 mm LLL nodule in the superior segment. There is no clinical or radiographic evidence of recurrent disease.\par \par CT chest of 2/27/19: \par - New soft tissue changes in the RML which are adjacent to postsurgical change and the right heart border. Differential diagnosis includes atelectasis and/or recurrent neoplasm, measuring up to approximately 3.2 x 2.2 cm. Partially calcified MANDEEP nodule, stable from previous.\par \par PET/CT of 3/8/19:\par - A few active right axillary nonenlarged lymph nodes (SUV max 2.5).\par - Stable round nodular soft tissue changes in the right middle lobe abutting the pericardium. 30mm rounded nodule on CT image 58 measures up to SUV max 4.1. A left upper lobe partially calcified nodule is stable. \par - Few small areas of subsegmental atelectasis and/or scarring in both lungs.\par - No evidence of pleural effusion, pericardial effusion or significant lymphadenopathy in the chest. \par \par Now s/p CT guided core biopsy of RML nodule on 3/21/19 with IR. Path revealed negative for malignant cells. \par \par Pt presents today for follow up. The patient denies SOB, cough, chest pain, hemoptysis, palpitation, fever, recent illness, hospitalization and significant weight loss.\par

## 2019-04-24 LAB — FUNGUS SPEC QL CULT: SIGNIFICANT CHANGE UP

## 2019-04-26 ENCOUNTER — OUTPATIENT (OUTPATIENT)
Dept: OUTPATIENT SERVICES | Facility: HOSPITAL | Age: 79
LOS: 1 days | Discharge: HOME | End: 2019-04-26

## 2019-04-26 DIAGNOSIS — Z98.890 OTHER SPECIFIED POSTPROCEDURAL STATES: Chronic | ICD-10-CM

## 2019-04-26 DIAGNOSIS — Z90.49 ACQUIRED ABSENCE OF OTHER SPECIFIED PARTS OF DIGESTIVE TRACT: Chronic | ICD-10-CM

## 2019-04-26 DIAGNOSIS — C34.90 MALIGNANT NEOPLASM OF UNSPECIFIED PART OF UNSPECIFIED BRONCHUS OR LUNG: ICD-10-CM

## 2019-05-01 ENCOUNTER — APPOINTMENT (OUTPATIENT)
Dept: HEMATOLOGY ONCOLOGY | Facility: CLINIC | Age: 79
End: 2019-05-01
Payer: MEDICARE

## 2019-05-01 VITALS
DIASTOLIC BLOOD PRESSURE: 70 MMHG | BODY MASS INDEX: 30.08 KG/M2 | RESPIRATION RATE: 18 BRPM | WEIGHT: 180.78 LBS | OXYGEN SATURATION: 99 % | HEART RATE: 78 BPM | TEMPERATURE: 98 F | SYSTOLIC BLOOD PRESSURE: 130 MMHG

## 2019-05-01 PROCEDURE — 99214 OFFICE O/P EST MOD 30 MIN: CPT

## 2019-05-02 LAB — ACID FAST STN SPEC: SIGNIFICANT CHANGE UP

## 2019-05-23 ENCOUNTER — APPOINTMENT (OUTPATIENT)
Dept: UROLOGY | Facility: CLINIC | Age: 79
End: 2019-05-23
Payer: MEDICARE

## 2019-05-23 PROCEDURE — 76775 US EXAM ABDO BACK WALL LIM: CPT

## 2019-05-30 ENCOUNTER — APPOINTMENT (OUTPATIENT)
Dept: UROLOGY | Facility: CLINIC | Age: 79
End: 2019-05-30

## 2019-07-09 ENCOUNTER — APPOINTMENT (OUTPATIENT)
Dept: THORACIC SURGERY | Facility: CLINIC | Age: 79
End: 2019-07-09
Payer: MEDICARE

## 2019-07-09 VITALS
RESPIRATION RATE: 16 BRPM | OXYGEN SATURATION: 97 % | TEMPERATURE: 97.4 F | SYSTOLIC BLOOD PRESSURE: 113 MMHG | HEART RATE: 71 BPM | DIASTOLIC BLOOD PRESSURE: 73 MMHG | BODY MASS INDEX: 30.12 KG/M2 | WEIGHT: 181 LBS

## 2019-07-09 PROCEDURE — 99214 OFFICE O/P EST MOD 30 MIN: CPT

## 2019-07-09 RX ORDER — CIPROFLOXACIN HYDROCHLORIDE 750 MG/1
750 TABLET, FILM COATED ORAL
Qty: 28 | Refills: 0 | Status: DISCONTINUED | COMMUNITY
Start: 2019-04-02 | End: 2019-07-09

## 2019-07-09 NOTE — PHYSICAL EXAM
[Sclera] : the sclera and conjunctiva were normal [Extraocular Movements] : extraocular movements were intact [Neck Appearance] : the appearance of the neck was normal [Neck Cervical Mass (___cm)] : no neck mass was observed [Jugular Venous Distention Increased] : there was no jugular-venous distention [] : no respiratory distress [Respiration, Rhythm And Depth] : normal respiratory rhythm and effort [Exaggerated Use Of Accessory Muscles For Inspiration] : no accessory muscle use [Auscultation Breath Sounds / Voice Sounds] : lungs were clear to auscultation bilaterally [Heart Rate And Rhythm] : heart rate was normal and rhythm regular [Diminished Respiratory Excursion] : normal chest expansion [Bowel Sounds] : normal bowel sounds [Abdomen Soft] : soft [Cervical Lymph Nodes Enlarged Posterior Bilaterally] : posterior cervical [Abdomen Tenderness] : non-tender [Cervical Lymph Nodes Enlarged Anterior Bilaterally] : anterior cervical [Supraclavicular Lymph Nodes Enlarged Bilaterally] : supraclavicular [Abnormal Walk] : normal gait [Skin Color & Pigmentation] : normal skin color and pigmentation [Impaired Insight] : insight and judgment were intact [No Focal Deficits] : no focal deficits [Oriented To Time, Place, And Person] : oriented to person, place, and time [Affect] : the affect was normal [Mood] : the mood was normal

## 2019-07-11 NOTE — HISTORY OF PRESENT ILLNESS
[FreeTextEntry1] : 79 year old female s/p flex bronch, uniportal right VATS, right upper lobectomy with en bloc wedge resection of RML, and MLND on 1/10/18. Pathology revealed RUL adenocarcinoma, involving RML, predominantly solid with minor acinar component, 1.2 x 0.8 x 0.5 cm, all LN's negative + visceral pleura and lymph vascular invasion, G3, pT2aN0, Stage 1B. She had completed chemotherapy with Dr. Davis (Oncology). \par \par On follow up CT chest scan  2/27/19 revealed a new soft tissue changes in the RML which are adjacent to postsurgical change and the right heart border measuring up to approximately 3.2 x 2.2 cm. Partially calcified MANDEEP nodule, stable from previous.\par \par PET/CT on 3/8/19 revealed a few active right axillary nonenlarged lymph nodes (SUV max 2.5). Stable round nodular soft tissue changes in the right middle lobe abutting the pericardium. 30mm rounded nodule measures up to SUV max 4.1. A left upper lobe partially calcified nodule is stable. Few small areas of subsegmental atelectasis and/or scarring in both lungs.\par \par She underwent s/p CT guided core biopsy of RML nodule on 3/21/19 with IR. Pathology was negative for malignant cells. \par \par On previous visit, I prescribed her Abx and asked her to return in 3 months with repeat CT scan. \par \par CT chest 6/28/19 reveals stable mild subpleural reticulonodular changes in LLL. Stable 6 mm nodule MANDEEP partially calcified consistent with benign process. No evidence of residual/recurrent tumor or metastatic disease involving the chest or upper abdomen. \par \par The patient reports continued cough with clear, white mucous. The patient denies shortness of breath, fever, chills, dysphagia or hemoptysis. She reports good appetite. \par \par

## 2019-07-11 NOTE — ASSESSMENT
[FreeTextEntry1] : 79 year old female s/p flex bronch, uniportal right VATS, right upper lobectomy with en bloc wedge resection of RML, and MLND on 1/10/18. Pathology revealed RUL adenocarcinoma, involving RML, predominantly solid with minor acinar component, 1.2 x 0.8 x 0.5 cm, all LN's negative + visceral pleura and lymph vascular invasion, G3, pT2aN0, Stage 1B. She had completed chemotherapy with Dr. Davis (Oncology). \par \par On follow up CT chest scan on 2/27/19 revealed a new soft tissue changes in the RML which are adjacent to postsurgical change and the right heart border measuring up to approximately 3.2 x 2.2 cm. Partially calcified MANDEEP nodule, stable from previous.\par \par PET/CT on 3/8/19 revealed a few active right axillary nonenlarged lymph nodes (SUV max 2.5). Stable round nodular soft tissue changes in the right middle lobe abutting the pericardium. 30mm rounded nodule measures up to SUV max 4.1. A left upper lobe partially calcified nodule is stable. Few small areas of subsegmental atelectasis and/or scarring in both lungs.\par \par She underwent s/p CT guided core biopsy of RML nodule on 3/21/19 with IR. Pathology was negative for malignant cells. \par \par On previous visit, I prescribed her Abx and asked her to return in 3 months with repeat CT scan. \par \par CT chest 6/28/19 reveals stable mild subpleural reticulonodular changes in LLL. Stable 6 mm nodule MANDEEP partially calcified consistent with benign process. No evidence of residual/recurrent tumor or metastatic disease involving the chest or upper abdomen. \par \par The patient reports continued cough with clear, white mucous. The patient denies shortness of breath, fever, chills, dysphagia or hemoptysis. She reports good appetite. \par \par I have reviewed the images with the patient and made the following recommendations. I have recommended the patient follow up in 3 months with a CT scan of the chest. Follow up with Pulmonologist for cough. \par \par Written by Bonnie Gonsales NP, acting as a scribe for Javad Corbin MD.\par The documentation recorded by the scribe accurately reflects the service I personally performed and the decisions made by me. Javad Corbin MD\par \par  \par

## 2019-07-11 NOTE — CONSULT LETTER
[Dear  ___] : Dear  [unfilled], [Courtesy Letter:] : I had the pleasure of seeing your patient, [unfilled], in my office today. [Please see my note below.] : Please see my note below. [Sincerely,] : Sincerely, [FreeTextEntry3] : Javad Corbin MD, FACS \par Chief, Division of Thoracic Surgery \par Director, Minimally Invasive Thoracic Surgery \par Department of Cardiovascular and Thoracic Surgery \par [facility] \par , Cardiovascular and Thoracic Surgery \par [facility] [employer] [facility] \par   [FreeTextEntry2] :  Ilya Alaniz MD (Med Onc)\par  Brigitte Mitchell DO (Pulm)\par  Nick Caba (PCP)\par  \par

## 2019-07-19 ENCOUNTER — APPOINTMENT (OUTPATIENT)
Dept: PULMONOLOGY | Facility: CLINIC | Age: 79
End: 2019-07-19
Payer: MEDICARE

## 2019-07-19 VITALS — OXYGEN SATURATION: 95 % | DIASTOLIC BLOOD PRESSURE: 67 MMHG | HEART RATE: 74 BPM | SYSTOLIC BLOOD PRESSURE: 109 MMHG

## 2019-07-19 PROCEDURE — 99214 OFFICE O/P EST MOD 30 MIN: CPT | Mod: 25

## 2019-07-19 PROCEDURE — 94060 EVALUATION OF WHEEZING: CPT

## 2019-07-19 NOTE — PROCEDURE
[FreeTextEntry1] : Spirometry performed in the office today was within normal limits, with some improvement post bronchodilator\par \par Chest CT scan report on June 28, 2019 noted and reviewed with patient in the office today

## 2019-10-17 ENCOUNTER — APPOINTMENT (OUTPATIENT)
Dept: PULMONOLOGY | Facility: CLINIC | Age: 79
End: 2019-10-17
Payer: MEDICARE

## 2019-10-17 VITALS
DIASTOLIC BLOOD PRESSURE: 73 MMHG | HEART RATE: 70 BPM | TEMPERATURE: 97.6 F | RESPIRATION RATE: 16 BRPM | SYSTOLIC BLOOD PRESSURE: 116 MMHG | OXYGEN SATURATION: 90 %

## 2019-10-17 PROCEDURE — 99214 OFFICE O/P EST MOD 30 MIN: CPT | Mod: 25

## 2019-10-17 PROCEDURE — 94060 EVALUATION OF WHEEZING: CPT

## 2019-10-19 NOTE — PROCEDURE
[FreeTextEntry1] : Chest CT scan performed on September 23, 2019 reviewed with patient at length in the office today\par \par Spirometry performed in the office today was within normal limits, with some improvement post bronchodilator\par

## 2019-10-19 NOTE — REASON FOR VISIT
[Follow-Up] : a follow-up visit [Abnormal CT Scan] : abnormal CT Scan [Cough] : cough [FreeTextEntry2] : Cough is uch better

## 2019-10-22 ENCOUNTER — APPOINTMENT (OUTPATIENT)
Dept: THORACIC SURGERY | Facility: CLINIC | Age: 79
End: 2019-10-22
Payer: MEDICARE

## 2019-10-22 VITALS
SYSTOLIC BLOOD PRESSURE: 113 MMHG | TEMPERATURE: 97.8 F | OXYGEN SATURATION: 98 % | DIASTOLIC BLOOD PRESSURE: 76 MMHG | HEART RATE: 67 BPM | RESPIRATION RATE: 16 BRPM | BODY MASS INDEX: 30.62 KG/M2 | WEIGHT: 184 LBS

## 2019-10-22 PROCEDURE — 99214 OFFICE O/P EST MOD 30 MIN: CPT

## 2019-10-22 NOTE — PHYSICAL EXAM
[Sclera] : the sclera and conjunctiva were normal [Extraocular Movements] : extraocular movements were intact [Neck Appearance] : the appearance of the neck was normal [Neck Cervical Mass (___cm)] : no neck mass was observed [Jugular Venous Distention Increased] : there was no jugular-venous distention [] : no respiratory distress [Respiration, Rhythm And Depth] : normal respiratory rhythm and effort [Auscultation Breath Sounds / Voice Sounds] : lungs were clear to auscultation bilaterally [Exaggerated Use Of Accessory Muscles For Inspiration] : no accessory muscle use [Diminished Respiratory Excursion] : normal chest expansion [Heart Rate And Rhythm] : heart rate was normal and rhythm regular [Abdomen Soft] : soft [Bowel Sounds] : normal bowel sounds [Abdomen Tenderness] : non-tender [Cervical Lymph Nodes Enlarged Anterior Bilaterally] : anterior cervical [Cervical Lymph Nodes Enlarged Posterior Bilaterally] : posterior cervical [Supraclavicular Lymph Nodes Enlarged Bilaterally] : supraclavicular [Skin Color & Pigmentation] : normal skin color and pigmentation [Abnormal Walk] : normal gait [No Focal Deficits] : no focal deficits [Impaired Insight] : insight and judgment were intact [Oriented To Time, Place, And Person] : oriented to person, place, and time [Mood] : the mood was normal [Affect] : the affect was normal

## 2019-10-29 NOTE — ASSESSMENT
[FreeTextEntry1] : 79 year old female s/p flex bronch, uniportal right VATS, right upper lobectomy with en bloc wedge resection of RML, and MLND on 1/10/18. Pathology revealed RUL adenocarcinoma, involving RML, predominantly solid with minor acinar component, 1.2 x 0.8 x 0.5 cm, all LN's negative + visceral pleura and lymph vascular invasion, G3, pT2aN0, Stage 1B. She had completed chemotherapy with Dr. Davis (Oncology). \par \par On follow up CT chest scan on 2/27/19 revealed a new soft tissue changes in the RML which are adjacent to postsurgical change and the right heart border measuring up to approximately 3.2 x 2.2 cm. Partially calcified MANDEEP nodule, stable from previous.\par \par PET/CT on 3/8/19 revealed a few active right axillary nonenlarged lymph nodes (SUV max 2.5). Stable round nodular soft tissue changes in the right middle lobe abutting the pericardium. 30mm rounded nodule measures up to SUV max 4.1. A left upper lobe partially calcified nodule is stable. Few small areas of subsegmental atelectasis and/or scarring in both lungs.\par \par She underwent s/p CT guided core biopsy of RML nodule on 3/21/19 with IR. Pathology was negative for malignant cells. \par \par CT chest 6/28/19 reveals stable mild subpleural reticulonodular changes in LLL. Stable 6 mm nodule MANDEEP partially calcified consistent with benign process. No evidence of residual/recurrent tumor or metastatic disease involving the chest or upper abdomen. \par \par CT chest scan 9/23/19 reveals focal area of small airways disease and mucoid impaction in the superior segment LLL with reticulonodular densities unchanged. Partially calcified 7 mm nodule MANDEEP. This is unchanged and likely benign and postinflammatory/infectious. \par \par I have reviewed the images with the patient and made the following recommendations. I have recommended the patient follow up in 3 months with a CXR. \par \par Written by Bonnie Gonsales NP, acting as a scribe for Javad Corbin MD.\par The documentation recorded by the scribe accurately reflects the service I personally performed and the decisions made by me. Javad Corbin MD\par \par

## 2019-10-29 NOTE — HISTORY OF PRESENT ILLNESS
[FreeTextEntry1] : 79 year old female s/p flex bronch, uniportal right VATS, right upper lobectomy with en bloc wedge resection of RML, and MLND on 1/10/18. Pathology revealed RUL adenocarcinoma, involving RML, predominantly solid with minor acinar component, 1.2 x 0.8 x 0.5 cm, all LN's negative + visceral pleura and lymph vascular invasion, G3, pT2aN0, Stage 1B. She had completed chemotherapy with Dr. Davis (Oncology). \par \par On follow up CT chest scan on 2/27/19 revealed a new soft tissue changes in the RML which are adjacent to postsurgical change and the right heart border measuring up to approximately 3.2 x 2.2 cm. Partially calcified MANDEEP nodule, stable from previous.\par \par PET/CT on 3/8/19 revealed a few active right axillary nonenlarged lymph nodes (SUV max 2.5). Stable round nodular soft tissue changes in the right middle lobe abutting the pericardium. 30mm rounded nodule measures up to SUV max 4.1. A left upper lobe partially calcified nodule is stable. Few small areas of subsegmental atelectasis and/or scarring in both lungs.\par \par She underwent s/p CT guided core biopsy of RML nodule on 3/21/19 with IR. Pathology was negative for malignant cells. \par \par CT chest 6/28/19 reveals stable mild subpleural reticulonodular changes in LLL. Stable 6 mm nodule MANDEEP partially calcified consistent with benign process. No evidence of residual/recurrent tumor or metastatic disease involving the chest or upper abdomen. \par \par CT chest scan 9/23/19 reveals focal area of small airways disease and mucoid impaction in the superior segment LLL with reticulonodular densities unchanged. Partially calcified 7 mm nodule MANDEEP. This is unchanged and likely benign and postinflammatory/infectious. \par \par The patient reports PND. The patient denies shortness of breath, cough, fever, chills, dysphagia or hemoptysis. \par

## 2019-10-29 NOTE — CONSULT LETTER
[Dear  ___] : Dear  [unfilled], [Courtesy Letter:] : I had the pleasure of seeing your patient, [unfilled], in my office today. [Please see my note below.] : Please see my note below. [Sincerely,] : Sincerely, [FreeTextEntry2] : Ilya Alaniz MD (Med Onc)\par Brigitte Mitchell DO (Pulm)\par Nick Caba (PCP)\par  \par  [FreeTextEntry3] : Javad Corbin MD, FACS \par Chief, Division of Thoracic Surgery \par Director, Minimally Invasive Thoracic Surgery \par Department of Cardiovascular and Thoracic Surgery \par [facility] \par , Cardiovascular and Thoracic Surgery \par [facility] \par

## 2020-01-21 ENCOUNTER — OUTPATIENT (OUTPATIENT)
Dept: OUTPATIENT SERVICES | Facility: HOSPITAL | Age: 80
LOS: 1 days | End: 2020-01-21
Payer: MEDICARE

## 2020-01-21 ENCOUNTER — APPOINTMENT (OUTPATIENT)
Dept: THORACIC SURGERY | Facility: CLINIC | Age: 80
End: 2020-01-21
Payer: MEDICARE

## 2020-01-21 ENCOUNTER — APPOINTMENT (OUTPATIENT)
Dept: RADIOLOGY | Facility: HOSPITAL | Age: 80
End: 2020-01-21

## 2020-01-21 ENCOUNTER — APPOINTMENT (OUTPATIENT)
Dept: THORACIC SURGERY | Facility: CLINIC | Age: 80
End: 2020-01-21

## 2020-01-21 VITALS
HEART RATE: 69 BPM | SYSTOLIC BLOOD PRESSURE: 134 MMHG | HEIGHT: 65 IN | DIASTOLIC BLOOD PRESSURE: 81 MMHG | RESPIRATION RATE: 16 BRPM | TEMPERATURE: 97.7 F | OXYGEN SATURATION: 97 % | WEIGHT: 180 LBS | BODY MASS INDEX: 29.99 KG/M2

## 2020-01-21 DIAGNOSIS — Z98.890 OTHER SPECIFIED POSTPROCEDURAL STATES: Chronic | ICD-10-CM

## 2020-01-21 DIAGNOSIS — R91.8 OTHER NONSPECIFIC ABNORMAL FINDING OF LUNG FIELD: ICD-10-CM

## 2020-01-21 DIAGNOSIS — C34.91 MALIGNANT NEOPLASM OF UNSPECIFIED PART OF RIGHT BRONCHUS OR LUNG: ICD-10-CM

## 2020-01-21 DIAGNOSIS — Z90.49 ACQUIRED ABSENCE OF OTHER SPECIFIED PARTS OF DIGESTIVE TRACT: Chronic | ICD-10-CM

## 2020-01-21 PROCEDURE — 99214 OFFICE O/P EST MOD 30 MIN: CPT

## 2020-01-21 PROCEDURE — 71046 X-RAY EXAM CHEST 2 VIEWS: CPT | Mod: 26

## 2020-01-21 NOTE — PHYSICAL EXAM
[Neck Appearance] : the appearance of the neck was normal [Sclera] : the sclera and conjunctiva were normal [] : the neck was supple [Neck Cervical Mass (___cm)] : no neck mass was observed [Respiration, Rhythm And Depth] : normal respiratory rhythm and effort [Heart Rate And Rhythm] : heart rate was normal and rhythm regular [Exaggerated Use Of Accessory Muscles For Inspiration] : no accessory muscle use [Heart Sounds] : normal S1 and S2 [Examination Of The Chest] : the chest was normal in appearance [Abdomen Tenderness] : non-tender [Abdomen Soft] : soft [Supraclavicular Lymph Nodes Enlarged Bilaterally] : supraclavicular [Cervical Lymph Nodes Enlarged Anterior Bilaterally] : anterior cervical [Cervical Lymph Nodes Enlarged Posterior Bilaterally] : posterior cervical [Skin Color & Pigmentation] : normal skin color and pigmentation [Abnormal Walk] : normal gait [No CVA Tenderness] : no ~M costovertebral angle tenderness [No Focal Deficits] : no focal deficits [Oriented To Time, Place, And Person] : oriented to person, place, and time [Affect] : the affect was normal [Impaired Insight] : insight and judgment were intact

## 2020-01-24 NOTE — REVIEW OF SYSTEMS
[Cough] : cough [Negative] : Heme/Lymph [Shortness Of Breath] : no shortness of breath [Wheezing] : no wheezing [SOB on Exertion] : no shortness of breath during exertion [FreeTextEntry6] : + cough, occasionally productive of clear sputum [FreeTextEntry9] : low back pain, sciatica, right leg pain

## 2020-01-24 NOTE — HISTORY OF PRESENT ILLNESS
[FreeTextEntry1] : Ms. OLGA LIDIA LANE is a 79 year old Mandarin speaking female presenting for follow up s/p FB, uniportal right VATS, right upper lobectomy with en bloc wedge resection of RML, and MLND on 1/10/18. Pathology revealed RUL T2aN0 adenocarcinoma, tumor involves RML, 1.2 x 0.8 x 0.5 cm, positive visceral pleura and lymph vascular invasion. She has completed chemotherapy with Dr. Davis (Oncology). \par \par She then underwent CT guided core biopsy of RML nodule on 3/21/19 with IR. Pathology was negative for malignant cells.  \par \par CXR on 1/21/19 revealed:\par - There is right lung volume loss with postsurgical change including chain sutures and surgical clips. \par - There is a 4 mm peripheral left midlung nodule. \par - The lungs are otherwise clear. \par - No pleural effusion or pneumothorax seen. \par - There is anterior wedging of a vertebral body at the thoracolumbar junction. \par \par CT chest scan 9/23/19 reveals focal area of small airways disease and mucoid impaction in the superior segment LLL with reticulonodular densities unchanged. Partially calcified 7 mm nodule MANDEEP. This is unchanged and likely benign and postinflammatory/infectious. \par \par Patient is here today for a follow up.  She admits to an occasional cough, which is productive of clear sputum and sciatica pain which radiates down her right leg.  She denies any fever, chills, shortness of breath, chest pain, hemoptysis, or recent illness. \par

## 2020-01-24 NOTE — CONSULT LETTER
[FreeTextEntry2] : Ilya Alaniz MD (Med Onc)\par Brigitte Mitchell DO (Pulm)\par Nick Caba (PCP) [FreeTextEntry3] : Javad Corbin MD, FACS \par Chief, Division of Thoracic Surgery \par Director, Minimally Invasive Thoracic Surgery \par Department of Cardiovascular and Thoracic Surgery \par [facility] \par , Cardiovascular and Thoracic Surgery\par \par

## 2020-01-24 NOTE — ASSESSMENT
[FreeTextEntry1] : 79 year old female s/p FB, uniportal right VATS, RULobectomy with en bloc wedge resection of RML, and MLND on 1/10/18. Pathology revealed RUL T2aN0 adenocarcinoma, tumor involves RML, 1.2 x 0.8 x 0.5 cm, positive visceral pleura and lymph vascular invasion. She has completed chemotherapy with Dr. Davis (Oncology). \par \par She then underwent CT guided core biopsy of RML nodule on 3/21/19 with IR. Pathology was negative for malignant cells.  \par \par CXR on 1/21/19 revealed:\par - There is right lung volume loss with postsurgical change including chain sutures and surgical clips. \par - There is a 4 mm peripheral left midlung nodule. \par - The lungs are otherwise clear. \par - No pleural effusion or pneumothorax seen. \par - There is anterior wedging of a vertebral body at the thoracolumbar junction. \par \par I have reviewed the patient's medical records and diagnostic images at time of this office consultation and have made the following recommendation:\par 1.  Follow up in 6 months with noncontrast Chest CT.\par \par I personally performed the services described in the documentation, reviewed the documentation recorded by the scribe in my presence and it accurately and completely records my words and actions.\par \par I, Nena Crenshaw NP, am scribing for and the presence of Dr. Corbin, the following sections HISTORY OF PRESENT ILLNESS, PAST MEDICAL/FAMILY/SOCIAL HISTORY; REVIEW OF SYSTEMS; VITAL SIGNS; PHYSICAL EXAM; DISPOSITION.\par  \par \par

## 2020-03-23 ENCOUNTER — NON-APPOINTMENT (OUTPATIENT)
Age: 80
End: 2020-03-23

## 2020-03-23 ENCOUNTER — OUTPATIENT (OUTPATIENT)
Dept: OUTPATIENT SERVICES | Facility: HOSPITAL | Age: 80
LOS: 1 days | End: 2020-03-23
Payer: MEDICARE

## 2020-03-23 DIAGNOSIS — Z90.49 ACQUIRED ABSENCE OF OTHER SPECIFIED PARTS OF DIGESTIVE TRACT: Chronic | ICD-10-CM

## 2020-03-23 DIAGNOSIS — Z98.890 OTHER SPECIFIED POSTPROCEDURAL STATES: Chronic | ICD-10-CM

## 2020-03-23 DIAGNOSIS — R22.43 LOCALIZED SWELLING, MASS AND LUMP, LOWER LIMB, BILATERAL: ICD-10-CM

## 2020-03-23 DIAGNOSIS — R22.41 LOCALIZED SWELLING, MASS AND LUMP, RIGHT LOWER LIMB: ICD-10-CM

## 2020-03-23 PROCEDURE — 93970 EXTREMITY STUDY: CPT

## 2020-03-23 PROCEDURE — 93970 EXTREMITY STUDY: CPT | Mod: 26

## 2020-04-16 ENCOUNTER — APPOINTMENT (OUTPATIENT)
Dept: PULMONOLOGY | Facility: CLINIC | Age: 80
End: 2020-04-16

## 2020-05-11 NOTE — H&P PST ADULT - NSANTHOSAYNRD_GEN_A_CORE
Satisfactory No. BETITO screening performed.  STOP BANG Legend: 0-2 = LOW Risk; 3-4 = INTERMEDIATE Risk; 5-8 = HIGH Risk

## 2020-05-15 ENCOUNTER — APPOINTMENT (OUTPATIENT)
Dept: UROLOGY | Facility: CLINIC | Age: 80
End: 2020-05-15
Payer: MEDICARE

## 2020-05-15 ENCOUNTER — APPOINTMENT (OUTPATIENT)
Dept: ULTRASOUND IMAGING | Facility: IMAGING CENTER | Age: 80
End: 2020-05-15
Payer: MEDICARE

## 2020-05-15 ENCOUNTER — OUTPATIENT (OUTPATIENT)
Dept: OUTPATIENT SERVICES | Facility: HOSPITAL | Age: 80
LOS: 1 days | End: 2020-05-15
Payer: MEDICARE

## 2020-05-15 ENCOUNTER — RESULT REVIEW (OUTPATIENT)
Age: 80
End: 2020-05-15

## 2020-05-15 VITALS
HEIGHT: 65 IN | DIASTOLIC BLOOD PRESSURE: 69 MMHG | WEIGHT: 180 LBS | TEMPERATURE: 98.4 F | HEART RATE: 76 BPM | SYSTOLIC BLOOD PRESSURE: 119 MMHG | BODY MASS INDEX: 29.99 KG/M2

## 2020-05-15 DIAGNOSIS — R79.89 OTHER SPECIFIED ABNORMAL FINDINGS OF BLOOD CHEMISTRY: ICD-10-CM

## 2020-05-15 DIAGNOSIS — N20.1 CALCULUS OF URETER: ICD-10-CM

## 2020-05-15 DIAGNOSIS — Z87.442 PERSONAL HISTORY OF URINARY CALCULI: ICD-10-CM

## 2020-05-15 DIAGNOSIS — Z98.890 OTHER SPECIFIED POSTPROCEDURAL STATES: Chronic | ICD-10-CM

## 2020-05-15 DIAGNOSIS — Z90.49 ACQUIRED ABSENCE OF OTHER SPECIFIED PARTS OF DIGESTIVE TRACT: Chronic | ICD-10-CM

## 2020-05-15 DIAGNOSIS — N13.30 UNSPECIFIED HYDRONEPHROSIS: ICD-10-CM

## 2020-05-15 PROCEDURE — 99213 OFFICE O/P EST LOW 20 MIN: CPT

## 2020-05-15 PROCEDURE — 76770 US EXAM ABDO BACK WALL COMP: CPT | Mod: 26

## 2020-05-15 PROCEDURE — 76770 US EXAM ABDO BACK WALL COMP: CPT

## 2020-05-15 NOTE — PHYSICAL EXAM
[General Appearance - Well Nourished] : well nourished [General Appearance - Well Developed] : well developed [Well Groomed] : well groomed [Normal Appearance] : normal appearance [General Appearance - In No Acute Distress] : no acute distress [Abdomen Soft] : soft [FreeTextEntry1] : b/l pitting edema 1+ in LE, slightly increased on R.  B/l palp pedal pulses [Abdomen Tenderness] : non-tender [Costovertebral Angle Tenderness] : no ~M costovertebral angle tenderness

## 2020-05-15 NOTE — HISTORY OF PRESENT ILLNESS
[FreeTextEntry1] : Patient is a 81 yo F who presents for abnormal labs.  She is referred by PCP for Cr 1.18, up from baseline of ~0.95.  She is also concerned because she has increased LE edema, R>L.  She was started on HCTZ by her Cardiologist for this.  LE duplex negative.\par She also notes feeling dull ache in her LUQ/flank.  No radiation.  No dysuria, gross hematuria.  She is voiding less than usual, perhaps not drinking as much.  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  No fever/chills.\par \par She has h/o L obstructing ureteral stone s/p L URS in 2/2018.  Since then she had follow up normal renal sono in 2019, no imaging in past 1 yr of kidneys.

## 2020-05-15 NOTE — ASSESSMENT
[FreeTextEntry1] : Patient is a 79 yo F h/o L ureteral stone who presents for abnormal labs/slightly elevated Cr.  Also with b/l LE edema.\par \par Discussed with pt that Cr is slightly increased but maybe multifactorial in etiology, including related to hydration status.\par Will obtain renal sono to rule out obstructing stone as potential cause.\par Repeat BMP today\par Will contact with results

## 2020-05-18 LAB
ANION GAP SERPL CALC-SCNC: 13 MMOL/L
BUN SERPL-MCNC: 23 MG/DL
CALCIUM SERPL-MCNC: 9 MG/DL
CHLORIDE SERPL-SCNC: 104 MMOL/L
CO2 SERPL-SCNC: 24 MMOL/L
CREAT SERPL-MCNC: 1.01 MG/DL
GLUCOSE SERPL-MCNC: 150 MG/DL
POTASSIUM SERPL-SCNC: 4.3 MMOL/L
SODIUM SERPL-SCNC: 141 MMOL/L

## 2020-05-21 ENCOUNTER — APPOINTMENT (OUTPATIENT)
Dept: UROLOGY | Facility: CLINIC | Age: 80
End: 2020-05-21

## 2020-07-08 ENCOUNTER — OUTPATIENT (OUTPATIENT)
Dept: OUTPATIENT SERVICES | Facility: HOSPITAL | Age: 80
LOS: 1 days | End: 2020-07-08
Payer: MEDICARE

## 2020-07-08 ENCOUNTER — RESULT REVIEW (OUTPATIENT)
Age: 80
End: 2020-07-08

## 2020-07-08 ENCOUNTER — APPOINTMENT (OUTPATIENT)
Dept: CT IMAGING | Facility: HOSPITAL | Age: 80
End: 2020-07-08
Payer: MEDICARE

## 2020-07-08 DIAGNOSIS — Z90.49 ACQUIRED ABSENCE OF OTHER SPECIFIED PARTS OF DIGESTIVE TRACT: Chronic | ICD-10-CM

## 2020-07-08 DIAGNOSIS — Z98.890 OTHER SPECIFIED POSTPROCEDURAL STATES: Chronic | ICD-10-CM

## 2020-07-08 DIAGNOSIS — C34.91 MALIGNANT NEOPLASM OF UNSPECIFIED PART OF RIGHT BRONCHUS OR LUNG: ICD-10-CM

## 2020-07-08 PROCEDURE — 71250 CT THORAX DX C-: CPT | Mod: 26

## 2020-07-08 PROCEDURE — 71250 CT THORAX DX C-: CPT

## 2020-08-04 ENCOUNTER — APPOINTMENT (OUTPATIENT)
Dept: THORACIC SURGERY | Facility: CLINIC | Age: 80
End: 2020-08-04
Payer: MEDICARE

## 2020-08-04 VITALS
SYSTOLIC BLOOD PRESSURE: 121 MMHG | RESPIRATION RATE: 17 BRPM | HEART RATE: 72 BPM | DIASTOLIC BLOOD PRESSURE: 77 MMHG | OXYGEN SATURATION: 95 %

## 2020-08-04 PROCEDURE — 99214 OFFICE O/P EST MOD 30 MIN: CPT

## 2020-08-04 NOTE — HISTORY OF PRESENT ILLNESS
[FreeTextEntry1] : 80 year old Mandarin speaking female presenting for follow up s/p FB, uniportal right VATS, right upper lobectomy with en bloc wedge resection of RML, and MLND on 1/10/18. Pathology revealed RUL T2aN0 adenocarcinoma, tumor involves RML, 1.2 x 0.8 x 0.5 cm, positive visceral pleura and lymph vascular invasion. She has completed chemotherapy with Dr. Davis (Oncology). \par \par She then underwent CT guided core biopsy of RML nodule on 3/21/19 with IR. Pathology was negative for malignant cells. \par \par CT chest scan 7/8/2020:\par -4mm sized peripheral calcified nodule in the MANDEEP\par -No solid or gg nodules. \par -no evidence of recurrent or metastatic nodules. \par \par CXR on 1/21/19 revealed:\par - There is right lung volume loss with postsurgical change including chain sutures and surgical clips. \par - There is a 4 mm peripheral left midlung nodule. \par - The lungs are otherwise clear. \par \par Patient is here today for a follow up. Patient denies shortness of breath, cough, chest pain, fever, chills, loss of appetite, weight loss, or hemoptysis.

## 2020-08-04 NOTE — CONSULT LETTER
[Dear  ___] : Dear  [unfilled], [Please see my note below.] : Please see my note below. [Consult Closing:] : Thank you very much for allowing me to participate in the care of this patient.  If you have any questions, please do not hesitate to contact me. [Sincerely,] : Sincerely, [FreeTextEntry2] : Ilya Alaniz MD (Med Onc)\par Brigitte Mitchell DO (Pulm)\par Nick Caba (PCP) \par  [FreeTextEntry3] : Jaavd Corbin MD, FACS \par Chief, Division of Thoracic Surgery \par Director, Minimally Invasive Thoracic Surgery \par Department of Cardiovascular and Thoracic Surgery \par [facility] \par , Cardiovascular and Thoracic Surgery\par \par

## 2020-08-04 NOTE — DATA REVIEWED
[FreeTextEntry1] : CT chest scan 7/8/2020:\par -4mm sized peripheral calcified nodule in the MANDEEP\par -No solid or gg nodules. \par -no evidence of recurrent or metastatic nodules.

## 2020-08-04 NOTE — REASON FOR VISIT
[Follow-Up: _____] : a [unfilled] follow-up visit [Family Member] : family member [FreeTextEntry1] : Lung Cancer

## 2020-08-04 NOTE — PHYSICAL EXAM
[Auscultation Breath Sounds / Voice Sounds] : lungs were clear to auscultation bilaterally [Heart Rate And Rhythm] : heart rate was normal and rhythm regular [Heart Sounds] : normal S1 and S2 [Murmurs] : no murmurs [Heart Sounds Gallop] : no gallops [Heart Sounds Pericardial Friction Rub] : no pericardial rub [Chest Visual Inspection Thoracic Asymmetry] : no chest asymmetry [Examination Of The Chest] : the chest was normal in appearance [Diminished Respiratory Excursion] : normal chest expansion [Abnormal Walk] : normal gait [Nail Clubbing] : no clubbing  or cyanosis of the fingernails [Musculoskeletal - Swelling] : no joint swelling seen [Motor Tone] : muscle strength and tone were normal [Skin Turgor] : normal skin turgor [Skin Color & Pigmentation] : normal skin color and pigmentation [] : no rash [Oriented To Time, Place, And Person] : oriented to person, place, and time [Impaired Insight] : insight and judgment were intact [Affect] : the affect was normal

## 2020-10-16 ENCOUNTER — APPOINTMENT (OUTPATIENT)
Dept: PULMONOLOGY | Facility: CLINIC | Age: 80
End: 2020-10-16
Payer: MEDICARE

## 2020-10-16 VITALS
OXYGEN SATURATION: 97 % | WEIGHT: 293 LBS | SYSTOLIC BLOOD PRESSURE: 121 MMHG | DIASTOLIC BLOOD PRESSURE: 71 MMHG | TEMPERATURE: 98.2 F | BODY MASS INDEX: 48.82 KG/M2 | HEART RATE: 78 BPM | HEIGHT: 65 IN | RESPIRATION RATE: 17 BRPM

## 2020-10-16 DIAGNOSIS — F51.04 PSYCHOPHYSIOLOGIC INSOMNIA: ICD-10-CM

## 2020-10-16 DIAGNOSIS — Z72.821 INADEQUATE SLEEP HYGIENE: ICD-10-CM

## 2020-10-16 PROCEDURE — 99204 OFFICE O/P NEW MOD 45 MIN: CPT

## 2020-10-16 NOTE — HISTORY OF PRESENT ILLNESS
[FreeTextEntry1] : HERE FOR EVALUTION OF INSOMNIA\par The patient is a 80-year-old female with history of lung adenocarcinoma s/p right VATS, right upper lobectomy with en bloc wedge resection of RML, and MLND on 1/10/18. Pathology revealed RUL T2aN0 adenocarcinoma, tumor involves RML, 1.2 x 0.8 x 0.5 cm, positive visceral pleura and lymph vascular invasion. She completed chemotherapy with Dr. Davis (Oncology). She then underwent CT guided core biopsy of RML nodule on 3/21/19 with IR. Pathology was negative for malignant cells. \par CT chest scan 7/8/2020:\par -4mm sized peripheral calcified nodule in the MANDEEP\par -No solid or gg nodules. \par -no evidence of recurrent or metastatic nodules. \par f/u with CTSx. \par \par She also has history of hypertension, diabetes, hypothyroidism, kidney stone in 2018, chronic insomnia. pt reports having been on Ambien 10 mg previously but ran out 2-3 months ago and switched to melatonin of unknown dose. With Ambien she was able to stay asleep for longer without frequent awakenings. With melatonin she is only able to sleep for 3-4 hours. She is accompanied by her daughter who states that patient goes to bed at 2 to 3 AM and wakes up at 6 AM on her own. She watches a lot of TV at night and during the daytime she dozes off on the couch constantly. Patient does not drive and lives with  in own home. She denies depression or anxiety. Endorses nocturia with the awakenings; denies gasping or choking sensations or apnea events. Denies waking up with headaches or dry mouth.\par \par  [Witnessed Apneas] : no witnessed sleep apnea [Snoring] : no snoring [Frequent Nocturnal Awakening] : frequent nocturnal awakening [Unintentional Sleep While Inactive] : unintentional sleep while inactive [Awakes Unrefreshed] : awakening unrefreshed [Unintentional Sleep while Active] : no unintentional sleep while active [Daytime Somnolence] : daytime somnolence [Recent  Weight Gain] : no recent weight gain [Awakes with Headache] : no headache upon awakening [Awakening With Dry Mouth] : no dry mouth upon awakening [Unusual Sleep Behavior] : no unusual sleep behavior [Hypersomnolence] : hypersomnolence [DMS] : DMS [DIS] : no DIS [To Bed: ___] : ~he/she~ goes to bed at [unfilled] [Arises: ___] : arises at [unfilled] [Lower Extremity Discomfort] : no lower extremity discomfort in evening or at bedtime [Sleep Onset Latency: ___ minutes] : sleep onset latency of [unfilled] minutes reported [WASO: ___] : Wake time after sleep onset is [unfilled] [Nocturnal Awakenings: ___] : ~he/she~ typically has [unfilled] nocturnal awakenings [TST: ___] : Total sleep time is [unfilled] [Daytime Sleep: ___] : daytime sleep: [unfilled]

## 2020-10-16 NOTE — REASON FOR VISIT
[Initial Evaluation] : an initial evaluation [FreeTextEntry2] : insomnia [Family Member] : family member

## 2020-10-16 NOTE — PHYSICAL EXAM
[General Appearance - Well Developed] : well developed [Normal Appearance] : normal appearance [Well Groomed] : well groomed [General Appearance - In No Acute Distress] : no acute distress [Enlarged Base of the Tongue] : enlargement of the base of the tongue [II] : II [Heart Rate And Rhythm] : heart rate was normal and rhythm regular [] : no respiratory distress [Heart Sounds] : normal S1 and S2 [Exaggerated Use Of Accessory Muscles For Inspiration] : no accessory muscle use [Auscultation Breath Sounds / Voice Sounds] : lungs were clear to auscultation bilaterally [Nail Clubbing] : no clubbing  or cyanosis of the fingernails [FreeTextEntry2] : bilateral chronic LE edema [1+ Pitting] : 1+  pitting [Skin Color & Pigmentation] : normal skin color and pigmentation [Affect] : the affect was normal [Oriented To Time, Place, And Person] : oriented to person, place, and time [Impaired Insight] : insight and judgment were intact

## 2020-10-16 NOTE — ASSESSMENT
[FreeTextEntry1] : ATTENDING ATTESTATION\par \par The patient has history of lung adenocarcinoma s/p VATS, RUL lobectomy, hypertension, diabetes, hypothyroidism, kidney stone in 2018, chronic insomnia. pt reports having been on Ambien 10 mg previously but ran out 2-3 months ago and switched to melatonin of unknown dose. Has severe DMS, TST 3-4 hours at night but also dosing off a lot during daytime. \par \par Insomnia - Educated patient on sleep hygiene. Avoid watching TV at night. denies caffeine intake.\par Trial of Trazodone 25 mg (half tablet) QHS, if no improvement after one week, can increase to 50 mg (1 tablet). Advised to be cautious for following-morning grogginess if occurs. \par If no further improvement, advised daughter to call me back. They wish to avoid ambien which I agree with.\par Gave sleep logs and advised how to use sleep restriction to advance bed time over every 3-4 days as tolerated. Once feasible, pt should avoid daytime napping and practice better sleep hygiene.\par

## 2020-10-16 NOTE — REVIEW OF SYSTEMS
Epidural Block    Start time: 8/21/2019 4:29 AM  End time: 8/21/2019 4:37 AM  Performed by: Maty Chatterjee MD  Authorized by: Maty Chatterjee MD     Pre-Procedure  Indication: labor epidural    Preanesthetic Checklist: patient identified, risks and benefits discussed, anesthesia consent, timeout performed and anesthesia consent    Timeout Time: 04:29        Epidural:   Patient position:  Seated  Prep region:  Lumbar  Prep: Betadine    Location:  L3-4    Needle and Epidural Catheter:   Needle Type:  Tuohy  Needle Gauge:  17 G  Injection Technique:  Loss of resistance using air  Attempts:  1  Catheter Size:  18 G  Events: no paresthesia and negative aspiration test    Test Dose:  Lidocaine 1.5% w/ epi and negative    Assessment:   Catheter Secured:  Tegaderm and tape  Insertion:  Uncomplicated  Patient tolerance:  Patient tolerated the procedure well with no immediate complications [EDS: ESS=____] : daytime somnolence: ESS=[unfilled] [Fatigue] : fatigue [Diabetes] : diabetes  [A.M. Headache] : no headache present upon awakening [Thyroid Disease] : thyroid disease [Nocturia] : nocturia [Arthralgias] : arthralgias [Leg Dysesthesias] : no leg dysesthesias [Anxious] : not anxious [Difficulty Initiating Sleep] : no difficulty falling asleep [Depression] : no depression [Acute Insomnia] : no acute insomnia [Chronic Insomnia] : chronic  insomnia [Lower Extremity Discomfort] : no lower extremity discomfort [Irresistible urge to move legs] : no irresistible urge to move legs because of lower extremity discomfort [Late day/ Evening symptoms] : no late day/evening symptoms [Sleep Disturbances due to LE symptoms] : ~T no sleep disturbances due to lower extremity symptoms [Hypersomnolence] : sleeping much more than usual [Unusual Sleep Behavior] : no unusual sleep behavior [Negative] : Psychiatric [FreeTextEntry6] : sciatica, but doesn't take any pain medications

## 2021-01-28 ENCOUNTER — APPOINTMENT (OUTPATIENT)
Dept: RADIOLOGY | Facility: HOSPITAL | Age: 81
End: 2021-01-28
Payer: MEDICARE

## 2021-01-28 ENCOUNTER — RESULT REVIEW (OUTPATIENT)
Age: 81
End: 2021-01-28

## 2021-01-28 ENCOUNTER — OUTPATIENT (OUTPATIENT)
Dept: OUTPATIENT SERVICES | Facility: HOSPITAL | Age: 81
LOS: 1 days | End: 2021-01-28
Payer: MEDICARE

## 2021-01-28 DIAGNOSIS — C34.91 MALIGNANT NEOPLASM OF UNSPECIFIED PART OF RIGHT BRONCHUS OR LUNG: ICD-10-CM

## 2021-01-28 DIAGNOSIS — Z98.890 OTHER SPECIFIED POSTPROCEDURAL STATES: Chronic | ICD-10-CM

## 2021-01-28 DIAGNOSIS — R91.8 OTHER NONSPECIFIC ABNORMAL FINDING OF LUNG FIELD: ICD-10-CM

## 2021-01-28 DIAGNOSIS — Z90.49 ACQUIRED ABSENCE OF OTHER SPECIFIED PARTS OF DIGESTIVE TRACT: Chronic | ICD-10-CM

## 2021-01-28 PROCEDURE — 71046 X-RAY EXAM CHEST 2 VIEWS: CPT | Mod: 26

## 2021-01-28 PROCEDURE — 71046 X-RAY EXAM CHEST 2 VIEWS: CPT

## 2021-02-09 ENCOUNTER — APPOINTMENT (OUTPATIENT)
Dept: THORACIC SURGERY | Facility: CLINIC | Age: 81
End: 2021-02-09
Payer: MEDICARE

## 2021-02-09 PROCEDURE — 99443: CPT | Mod: 95

## 2021-02-09 NOTE — CONSULT LETTER
[FreeTextEntry2] : Ilya Alaniz MD (Med Onc)\par Brigitte Mitchell DO (Pulm)\par Nick Caba (PCP)  [FreeTextEntry3] : Javad Corbin MD, FACS \par Chief, Division of Thoracic Surgery \par Director, Minimally Invasive Thoracic Surgery \par Department of Cardiovascular and Thoracic Surgery \par [facility] \par , Cardiovascular and Thoracic Surgery\par \par

## 2021-02-09 NOTE — ASSESSMENT
[FreeTextEntry1] : 80 year old Mandarin speaking female, s/p FB, uniportal right VATS, right upper lobectomy with en bloc wedge resection of RML, and MLND on 1/10/18. Pathology revealed RUL T2aN0 adenocarcinoma, tumor involves RML, 1.2 x 0.8 x 0.5 cm, positive visceral pleura and lymph vascular invasion. She has completed chemotherapy with Dr. Davis (Oncology). \par \par She then underwent CT guided core biopsy of RML nodule on 3/21/19 with IR. Pathology was negative for malignant cells. \par \par CXR on 01/28/2021:\par - post-op changes\par - stable 4 mm peripheral left midlung\par \par I have reviewed the patient's medical records and diagnostic images at time of this office consultation and have made the following recommendation:\par 1. CT chest reviewed and explained to patient, I recommended patient to return to office in 6 months with CT Chest without contrast.\par \par I have spent 25 minutes on the phone discussing above result and plan of care with patient.\par \par I personally performed the services described in the documentation, reviewed the documentation recorded by the scribe in my presence and it accurately and completely records my words and actions.\par \par I, Brianna Dwyer NP, am scribing for and the presence of MIGUEL ANGEL Serrano, the following sections HISTORY OF PRESENT ILLNESS, PAST MEDICAL/FAMILY/SOCIAL HISTORY; REVIEW OF SYSTEMS; VITAL SIGNS; PHYSICAL EXAM; DISPOSITION.

## 2021-02-09 NOTE — HISTORY OF PRESENT ILLNESS
[Home] : at home, [unfilled] , at the time of the visit. [Medical Office: ([facility])___] : at the medical office located in  [Verbal consent obtained from patient] : the patient, [unfilled] [FreeTextEntry1] : 80 year old Mandarin speaking female, s/p FB, uniportal right VATS, right upper lobectomy with en bloc wedge resection of RML, and MLND on 1/10/18. Pathology revealed RUL T2aN0 adenocarcinoma, tumor involves RML, 1.2 x 0.8 x 0.5 cm, positive visceral pleura and lymph vascular invasion. She has completed chemotherapy with Dr. Davis (Oncology). \par \par She then underwent CT guided core biopsy of RML nodule on 3/21/19 with IR. Pathology was negative for malignant cells. \par \par CXR on 1/21/19 revealed:\par - There is right lung volume loss with postsurgical change including chain sutures and surgical clips. \par - There is a 4 mm peripheral left midlung nodule. \par - The lungs are otherwise clear. \par \par CT chest scan 7/8/2020:\par -4mm sized peripheral calcified nodule in the MANDEEP\par -No solid or gg nodules. \par -no evidence of recurrent or metastatic nodules. \par \par CXR on 01/28/2021:\par - post-op changes\par - stable 4 mm peripheral left midlung\par \par Patient is here today for a follow up. Patient denies shortness of breath, cough, chest pain, fever, chills.

## 2021-02-09 NOTE — DATA REVIEWED
[FreeTextEntry1] : CXR on 01/28/2021:\par - post-op changes\par - stable 4 mm peripheral left midlung

## 2021-04-16 NOTE — H&P PST ADULT - NS MD HP PULSE RADIAL
Sleep Apnea  Sleep apnea affects breathing during sleep. It causes breathing to stop for a short time or to become shallow. It can also increase the risk of:  · Heart attack.  · Stroke.  · Being very overweight (obese).  · Diabetes.  · Heart failure.  · Irregular heartbeat.  The goal of treatment is to help you breathe normally again.  What are the causes?  There are three kinds of sleep apnea:  · Obstructive sleep apnea. This is caused by a blocked or collapsed airway.  · Central sleep apnea. This happens when the brain does not send the right signals to the muscles that control breathing.  · Mixed sleep apnea. This is a combination of obstructive and central sleep apnea.  The most common cause of this condition is a collapsed or blocked airway. This can happen if:  · Your throat muscles are too relaxed.  · Your tongue and tonsils are too large.  · You are overweight.  · Your airway is too small.  What increases the risk?  · Being overweight.  · Smoking.  · Having a small airway.  · Being older.  · Being male.  · Drinking alcohol.  · Taking medicines to calm yourself (sedatives or tranquilizers).  · Having family members with the condition.  What are the signs or symptoms?  · Trouble staying asleep.  · Being sleepy or tired during the day.  · Getting angry a lot.  · Loud snoring.  · Headaches in the morning.  · Not being able to focus your mind (concentrate).  · Forgetting things.  · Less interest in sex.  · Mood swings.  · Personality changes.  · Feelings of sadness (depression).  · Waking up a lot during the night to pee (urinate).  · Dry mouth.  · Sore throat.  How is this diagnosed?  · Your medical history.  · A physical exam.  · A test that is done when you are sleeping (sleep study). The test is most often done in a sleep lab but may also be done at home.  How is this treated?    · Sleeping on your side.  · Using a medicine to get rid of mucus in your nose (decongestant).  · Avoiding the use of alcohol,  medicines to help you relax, or certain pain medicines (narcotics).  · Losing weight, if needed.  · Changing your diet.  · Not smoking.  · Using a machine to open your airway while you sleep, such as:  ? An oral appliance. This is a mouthpiece that shifts your lower jaw forward.  ? A CPAP device. This device blows air through a mask when you breathe out (exhale).  ? An EPAP device. This has valves that you put in each nostril.  ? A BPAP device. This device blows air through a mask when you breathe in (inhale) and breathe out.  · Having surgery if other treatments do not work.  It is important to get treatment for sleep apnea. Without treatment, it can lead to:  · High blood pressure.  · Coronary artery disease.  · In men, not being able to have an erection (impotence).  · Reduced thinking ability.  Follow these instructions at home:  Lifestyle  · Make changes that your doctor recommends.  · Eat a healthy diet.  · Lose weight if needed.  · Avoid alcohol, medicines to help you relax, and some pain medicines.  · Do not use any products that contain nicotine or tobacco, such as cigarettes, e-cigarettes, and chewing tobacco. If you need help quitting, ask your doctor.  General instructions  · Take over-the-counter and prescription medicines only as told by your doctor.  · If you were given a machine to use while you sleep, use it only as told by your doctor.  · If you are having surgery, make sure to tell your doctor you have sleep apnea. You may need to bring your device with you.  · Keep all follow-up visits as told by your doctor. This is important.  Contact a doctor if:  · The machine that you were given to use during sleep bothers you or does not seem to be working.  · You do not get better.  · You get worse.  Get help right away if:  · Your chest hurts.  · You have trouble breathing in enough air.  · You have an uncomfortable feeling in your back, arms, or stomach.  · You have trouble talking.  · One side of your  body feels weak.  · A part of your face is hanging down.  These symptoms may be an emergency. Do not wait to see if the symptoms will go away. Get medical help right away. Call your local emergency services (911 in the [place] Do not drive yourself to the hospital.  Summary  · This condition affects breathing during sleep.  · The most common cause is a collapsed or blocked airway.  · The goal of treatment is to help you breathe normally while you sleep.  This information is not intended to replace advice given to you by your health care provider. Make sure you discuss any questions you have with your health care provider.  Document Released: 09/26/2009 Document Revised: 10/04/2019 Document Reviewed: 08/13/2019  Elsevier Patient Education © 2020 [employer]     right normal/left normal

## 2021-07-23 ENCOUNTER — OUTPATIENT (OUTPATIENT)
Dept: OUTPATIENT SERVICES | Facility: HOSPITAL | Age: 81
LOS: 1 days | End: 2021-07-23
Payer: MEDICARE

## 2021-07-23 ENCOUNTER — APPOINTMENT (OUTPATIENT)
Dept: CT IMAGING | Facility: HOSPITAL | Age: 81
End: 2021-07-23
Payer: MEDICARE

## 2021-07-23 DIAGNOSIS — Z98.890 OTHER SPECIFIED POSTPROCEDURAL STATES: Chronic | ICD-10-CM

## 2021-07-23 DIAGNOSIS — C34.91 MALIGNANT NEOPLASM OF UNSPECIFIED PART OF RIGHT BRONCHUS OR LUNG: ICD-10-CM

## 2021-07-23 DIAGNOSIS — Z90.49 ACQUIRED ABSENCE OF OTHER SPECIFIED PARTS OF DIGESTIVE TRACT: Chronic | ICD-10-CM

## 2021-07-23 PROCEDURE — 71250 CT THORAX DX C-: CPT | Mod: 26,ME

## 2021-07-23 PROCEDURE — G1004: CPT

## 2021-07-23 PROCEDURE — 71250 CT THORAX DX C-: CPT | Mod: ME

## 2021-08-10 ENCOUNTER — APPOINTMENT (OUTPATIENT)
Dept: THORACIC SURGERY | Facility: CLINIC | Age: 81
End: 2021-08-10
Payer: MEDICARE

## 2021-08-10 VITALS
TEMPERATURE: 97.7 F | RESPIRATION RATE: 17 BRPM | HEIGHT: 65 IN | BODY MASS INDEX: 27.49 KG/M2 | HEART RATE: 74 BPM | DIASTOLIC BLOOD PRESSURE: 66 MMHG | WEIGHT: 165 LBS | OXYGEN SATURATION: 96 % | SYSTOLIC BLOOD PRESSURE: 118 MMHG

## 2021-08-10 PROCEDURE — 99214 OFFICE O/P EST MOD 30 MIN: CPT

## 2021-08-10 NOTE — CONSULT LETTER
[Dear  ___] : Dear  [unfilled], [Courtesy Letter:] : I had the pleasure of seeing your patient, [unfilled], in my office today. [Please see my note below.] : Please see my note below. [Sincerely,] : Sincerely, [FreeTextEntry2] : Ilya Alaniz MD (Med Onc)\par Brigitte Mitchell DO (Pulm)\par Nick Caba (PCP) \par  [FreeTextEntry3] : Javad Corbin MD, FACS \par Chief, Division of Thoracic Surgery \par Director, Minimally Invasive Thoracic Surgery \par Department of Cardiovascular and Thoracic Surgery \par [facility] \par , Cardiovascular and Thoracic Surgery\par \par \par

## 2021-08-10 NOTE — HISTORY OF PRESENT ILLNESS
[FreeTextEntry1] : 81 year old Mandarin speaking female, s/p FB, uniportal right VATS, right upper lobectomy with en bloc wedge resection of RML, and MLND on 1/10/18. Pathology revealed RUL T2aN0 adenocarcinoma, tumor involves RML, 1.2 x 0.8 x 0.5 cm, positive visceral pleura and lymph vascular invasion. She has completed chemotherapy with Dr. Davis (Oncology). \par \par She then underwent CT guided core biopsy of RML nodule on 3/21/19 with IR. Pathology was negative for malignant cells. \par \par CXR on 1/21/19 revealed:\par - There is right lung volume loss with postsurgical change including chain sutures and surgical clips. \par - There is a 4 mm peripheral left midlung nodule. \par - The lungs are otherwise clear. \par \par CT chest scan 7/8/2020:\par -4mm sized peripheral calcified nodule in the MANDEEP\par -No solid or gg nodules. \par -no evidence of recurrent or metastatic nodules. \par \par CXR on 01/28/2021:\par - post-op changes\par - stable 4 mm peripheral left midlung\par \par CT chest on 7/23/21:\par - RUL postop changes. \par - Unchanged, MANDEEP subpleural calcified nodules \par \par Patient is here today for a follow up. Denies SOB, CP, cough.\par

## 2021-08-10 NOTE — ASSESSMENT
[FreeTextEntry1] : 81 year old Mandarin speaking female, s/p FB, uniportal right VATS, right upper lobectomy with en bloc wedge resection of RML, and MLND on 1/10/18. Pathology revealed RUL T2aN0 adenocarcinoma, tumor involves RML, 1.2 x 0.8 x 0.5 cm, positive visceral pleura and lymph vascular invasion. She has completed chemotherapy with Dr. Davis (Oncology). \par \par She then underwent CT guided core biopsy of RML nodule on 3/21/19 with IR. Pathology was negative for malignant cells. \par \par CT chest on 7/23/21:\par - RUL postop changes. \par - Unchanged, MANDEEP subpleural calcified nodules \par \par I have independently reviewed the medical records and imaging at the time of this office consultation, and discussed the following interpretations and recommendations with the patient:\par - CT scan showed no evidence of recurrence, recommended patient to return to office in 6mo w/ CXR.\par Recommendations reviewed with patient during this office visit, and all questions answered; Patient instructed on the importance of follow up and verbalizes understanding.\par \par \par I personally performed the services described in the documentation, reviewed the documentation recorded by the scribe in my presence and it accurately and completely records my words and actions.\par \par I, Addis Lawson NP, am scribing for and the presence of YAQUELIN SerranoIM, the following sections HISTORY OF PRESENT ILLNESS, PAST MEDICAL/FAMILY/SOCIAL HISTORY; REVIEW OF SYSTEMS; VITAL SIGNS; PHYSICAL EXAM; DISPOSITION.\par \par

## 2021-08-18 NOTE — ASU DISCHARGE PLAN (ADULT/PEDIATRIC). - ITEMS TO FOLLOWUP WITH YOUR PHYSICIAN'S
Follow up in 1 week for stent removal Transposition Flap Text: The defect edges were debeveled with a #15 scalpel blade.  Given the location of the defect and the proximity to free margins a transposition flap was deemed most appropriate.  Using a sterile surgical marker, an appropriate transposition flap was drawn incorporating the defect.    The area thus outlined was incised deep to adipose tissue with a #15 scalpel blade.  The skin margins were undermined to an appropriate distance in all directions utilizing iris scissors.

## 2021-09-15 ENCOUNTER — APPOINTMENT (OUTPATIENT)
Dept: PULMONOLOGY | Facility: CLINIC | Age: 81
End: 2021-09-15
Payer: MEDICARE

## 2021-09-15 VITALS
OXYGEN SATURATION: 94 % | DIASTOLIC BLOOD PRESSURE: 72 MMHG | TEMPERATURE: 97.8 F | SYSTOLIC BLOOD PRESSURE: 115 MMHG | HEART RATE: 67 BPM

## 2021-09-15 PROCEDURE — 99214 OFFICE O/P EST MOD 30 MIN: CPT

## 2021-09-16 NOTE — REASON FOR VISIT
[Follow-Up] : a follow-up visit [Cough] : cough [TextBox_44] : Cough is under much better control now

## 2021-09-16 NOTE — PROCEDURE
[FreeTextEntry1] : \par  CT Chest No Cont             Final\par \par No Documents Attached\par \par \par \par \par   EXAM:  CT CHEST\par \par \par PROCEDURE DATE:  07/23/2021\par \par \par \par INTERPRETATION:  EXAMINATION: CT CHEST\par \par CLINICAL INDICATION: Lung cancer.\par \par TECHNIQUE: Noncontrast CT of the chest was obtained.\par \par COMPARISON: Multiple CT, most recent 7/8/2020.\par \par FINDINGS:\par \par AIRWAYS AND LUNGS: The central tracheobronchial tree is patent.  Right upper lobe resection with associated postsurgical changes. Left upper lobe subpleural calcified nodules unchanged from most recent prior study.\par \par MEDIASTINUM AND PLEURA: There are no enlarged mediastinal, hilar or axillary lymph nodes. The visualized portion of the thyroid gland is unremarkable. There is no pleural effusion. There is no pneumothorax.\par \par HEART AND VESSELS: There is mild cardiomegaly.  There are atherosclerotic calcifications of the aorta and coronary arteries.  There is no pericardial effusion.\par \par UPPER ABDOMEN: Images of the upper abdomen demonstrate no abnormality.\par \par BONES AND SOFT TISSUES: There are mild degenerative changes of the spine.  The soft tissues are unremarkable.\par \par TUBES/LINES: None.\par \par IMPRESSION:\par Stable exam\par \par --- End of Report ---\par \par \par \par \par \par \par \par MILDRED MCCRACKEN MD; Attending Radiologist\par This document has been electronically signed. Jul 25 2021  8:30PM\par \par  \par \par  Ordered by: MIGUEL ANGEL WHITTEN       Collected/Examined: [id number] 11:39AM       \par Verification Required       Stage: Final       \par  Performed at: [facility] [place]       Resulted: [id number] 08:27PM       Last Updated: 25Jul2021 08:34PM       Accession: [id number]

## 2022-02-07 ENCOUNTER — APPOINTMENT (OUTPATIENT)
Dept: RADIOLOGY | Facility: HOSPITAL | Age: 82
End: 2022-02-07
Payer: MEDICARE

## 2022-02-07 ENCOUNTER — OUTPATIENT (OUTPATIENT)
Dept: OUTPATIENT SERVICES | Facility: HOSPITAL | Age: 82
LOS: 1 days | End: 2022-02-07
Payer: MEDICARE

## 2022-02-07 DIAGNOSIS — Z98.890 OTHER SPECIFIED POSTPROCEDURAL STATES: Chronic | ICD-10-CM

## 2022-02-07 DIAGNOSIS — C34.91 MALIGNANT NEOPLASM OF UNSPECIFIED PART OF RIGHT BRONCHUS OR LUNG: ICD-10-CM

## 2022-02-07 DIAGNOSIS — Z90.49 ACQUIRED ABSENCE OF OTHER SPECIFIED PARTS OF DIGESTIVE TRACT: Chronic | ICD-10-CM

## 2022-02-07 PROCEDURE — 71046 X-RAY EXAM CHEST 2 VIEWS: CPT | Mod: 26

## 2022-02-07 PROCEDURE — 71046 X-RAY EXAM CHEST 2 VIEWS: CPT

## 2022-02-15 ENCOUNTER — APPOINTMENT (OUTPATIENT)
Dept: THORACIC SURGERY | Facility: CLINIC | Age: 82
End: 2022-02-15
Payer: MEDICARE

## 2022-02-15 ENCOUNTER — TRANSCRIPTION ENCOUNTER (OUTPATIENT)
Age: 82
End: 2022-02-15

## 2022-02-15 VITALS
BODY MASS INDEX: 28.17 KG/M2 | HEART RATE: 70 BPM | HEIGHT: 64 IN | RESPIRATION RATE: 17 BRPM | SYSTOLIC BLOOD PRESSURE: 104 MMHG | OXYGEN SATURATION: 96 % | WEIGHT: 165 LBS | DIASTOLIC BLOOD PRESSURE: 70 MMHG

## 2022-02-15 PROCEDURE — 99214 OFFICE O/P EST MOD 30 MIN: CPT

## 2022-02-15 NOTE — PHYSICAL EXAM
[] : no respiratory distress [Auscultation Breath Sounds / Voice Sounds] : lungs were clear to auscultation bilaterally [Heart Rate And Rhythm] : heart rate was normal and rhythm regular [Heart Sounds Gallop] : no gallops [Heart Sounds] : normal S1 and S2 [Murmurs] : no murmurs [Heart Sounds Pericardial Friction Rub] : no pericardial rub [Examination Of The Chest] : the chest was normal in appearance [Chest Visual Inspection Thoracic Asymmetry] : no chest asymmetry [Diminished Respiratory Excursion] : normal chest expansion

## 2022-02-15 NOTE — ASSESSMENT
[FreeTextEntry1] : 82 year old Mandarin speaking female, s/p FB, uniportal right VATS, right upper lobectomy with en bloc wedge resection of RML, and MLND on 1/10/18. Pathology revealed RUL T2aN0 adenocarcinoma, tumor involves RML, 1.2 x 0.8 x 0.5 cm, positive visceral pleura and lymph vascular invasion. She has completed chemotherapy with Dr. Davis (Oncology). \par \par She then underwent CT guided core biopsy of RML nodule on 3/21/19 with IR. Pathology was negative for malignant cells. \par \par CXR on 02/07/2022:\par - The lungs demonstrate stable 5 mm nodular density in the left lower lobe. Linear subsegmental atelectatic changes are seen at the left base.\par \par I have reviewed the patient's medical records and diagnostic images at time of this office consultation and have made the following recommendation:\par 1. CXR reviewed and explained to patient, I recommended patient to return to office in 6 months with CT Chest without contrast. \par \par I personally performed the services described in the documentation, reviewed the documentation recorded by the scribe in my presence and it accurately and completely records my words and actions.\par \par I, Brianna Dwyer NP, am scribing for and the presence of MIGUEL ANGEL Serrano, the following sections HISTORY OF PRESENT ILLNESS, PAST MEDICAL/FAMILY/SOCIAL HISTORY; REVIEW OF SYSTEMS; VITAL SIGNS; PHYSICAL EXAM; DISPOSITION.\par

## 2022-02-15 NOTE — CONSULT LETTER
[Dear  ___] : Dear  [unfilled], [Courtesy Letter:] : I had the pleasure of seeing your patient, [unfilled], in my office today. [Please see my note below.] : Please see my note below. [Sincerely,] : Sincerely, [FreeTextEntry2] : Ilya Alaniz MD (Med Onc)\par Brigitte Mitchell DO (Pulm)\par Nick Caba (PCP) \par  [FreeTextEntry3] : Javad Corbin MD, FACS \par Chief, Division of Thoracic Surgery \par Director, Minimally Invasive Thoracic Surgery \par Department of Cardiovascular and Thoracic Surgery \par [facility] \par , Cardiovascular and Thoracic Surgery\par \par \par

## 2022-02-15 NOTE — HISTORY OF PRESENT ILLNESS
[FreeTextEntry1] : 82 year old Mandarin speaking female, s/p FB, uniportal right VATS, right upper lobectomy with en bloc wedge resection of RML, and MLND on 1/10/18. Pathology revealed RUL T2aN0 adenocarcinoma, tumor involves RML, 1.2 x 0.8 x 0.5 cm, positive visceral pleura and lymph vascular invasion. She has completed chemotherapy with Dr. Davis (Oncology). \par \par She then underwent CT guided core biopsy of RML nodule on 3/21/19 with IR. Pathology was negative for malignant cells. \par \par CXR on 1/21/19 revealed:\par - There is right lung volume loss with postsurgical change including chain sutures and surgical clips. \par - There is a 4 mm peripheral left midlung nodule. \par - The lungs are otherwise clear. \par \par CT chest scan 7/8/2020:\par -4mm sized peripheral calcified nodule in the MANDEEP\par -No solid or gg nodules. \par -no evidence of recurrent or metastatic nodules. \par \par CXR on 01/28/2021:\par - post-op changes\par - stable 4 mm peripheral left midlung\par \par CT chest on 7/23/21:\par - RUL postop changes. \par - Unchanged, MANDEEP subpleural calcified nodules \par \par CXR on 02/07/2022:\par - The lungs demonstrate stable 5 mm nodular density in the left lower lobe. Linear subsegmental atelectatic changes are seen at the left base.\par \par Patient is here today for a follow up. Patient denies shortness of breath, cough, chest pain, fever, chills.

## 2022-03-16 ENCOUNTER — APPOINTMENT (OUTPATIENT)
Dept: PULMONOLOGY | Facility: CLINIC | Age: 82
End: 2022-03-16
Payer: MEDICARE

## 2022-03-16 VITALS
OXYGEN SATURATION: 96 % | TEMPERATURE: 97.9 F | DIASTOLIC BLOOD PRESSURE: 73 MMHG | HEART RATE: 70 BPM | SYSTOLIC BLOOD PRESSURE: 116 MMHG

## 2022-03-16 LAB — SARS-COV-2 AG RESP QL IA.RAPID: NEGATIVE

## 2022-03-16 PROCEDURE — 88738 HGB QUANT TRANSCUTANEOUS: CPT

## 2022-03-16 PROCEDURE — 94727 GAS DIL/WSHOT DETER LNG VOL: CPT

## 2022-03-16 PROCEDURE — 94010 BREATHING CAPACITY TEST: CPT

## 2022-03-16 PROCEDURE — 99214 OFFICE O/P EST MOD 30 MIN: CPT | Mod: 25

## 2022-03-16 PROCEDURE — 94729 DIFFUSING CAPACITY: CPT

## 2022-03-16 PROCEDURE — 87811 SARS-COV-2 COVID19 W/OPTIC: CPT | Mod: QW

## 2022-03-16 PROCEDURE — ZZZZZ: CPT

## 2022-03-16 NOTE — DISCUSSION/SUMMARY
[FreeTextEntry1] : Improved cough secondary to postnasal drip.  History of lung adenocarcinoma, status post resection.  Hypothyroidism.

## 2022-03-16 NOTE — PROCEDURE
[FreeTextEntry1] : Pulmonary function test performed in my office today: Spirometry is within normal limits; lung volume is within normal limits; diffusion is within normal limits.\par \par  POCT COVID-19 (Binax Rapid Antigen Card)             Final\par \par No Documents Attached\par \par \par   Test   Result   Flag Reference Goal Last Verified \par   POCT COVID-19 (Binax Rapid Antigen Card) Negative      REQUIRED \par \par  Ordered by: SCOTT GONZALEZ       Collected/Examined: 16Mar2022 11:44AM       \par Verification Required       Stage: Final       \par  Performed at: In Office       Performed by: SCOTT GONZALEZ       Resulted: 16Mar2022 11:44AM       Last Updated: 16Mar2022 11:44AM       \par \par \par  CT Chest No Cont             Final\par \par No Documents Attached\par \par \par \par \par   EXAM:  CT CHEST\par \par \par PROCEDURE DATE:  07/23/2021\par \par \par \par INTERPRETATION:  EXAMINATION: CT CHEST\par \par CLINICAL INDICATION: Lung cancer.\par \par TECHNIQUE: Noncontrast CT of the chest was obtained.\par \par COMPARISON: Multiple CT, most recent 7/8/2020.\par \par FINDINGS:\par \par AIRWAYS AND LUNGS: The central tracheobronchial tree is patent.  Right upper lobe resection with associated postsurgical changes. Left upper lobe subpleural calcified nodules unchanged from most recent prior study.\par \par MEDIASTINUM AND PLEURA: There are no enlarged mediastinal, hilar or axillary lymph nodes. The visualized portion of the thyroid gland is unremarkable. There is no pleural effusion. There is no pneumothorax.\par \par HEART AND VESSELS: There is mild cardiomegaly.  There are atherosclerotic calcifications of the aorta and coronary arteries.  There is no pericardial effusion.\par \par UPPER ABDOMEN: Images of the upper abdomen demonstrate no abnormality.\par \par BONES AND SOFT TISSUES: There are mild degenerative changes of the spine.  The soft tissues are unremarkable.\par \par TUBES/LINES: None.\par \par IMPRESSION:\par Stable exam\par \par --- End of Report ---\par \par \par \par \par \par \par \par MILDRED MCCRACKEN MD; Attending Radiologist\par This document has been electronically signed. Jul 25 2021  8:30PM\par \par  \par \par  Ordered by: MIGUEL ANGEL WHITTEN       Collected/Examined: [id number] 11:39AM       \par Verification Required       Stage: Final       \par  Performed at: [facility] [place]       Resulted: [id number] 08:27PM       Last Updated: [id number] 08:34PM       Accession: [id number]

## 2022-03-16 NOTE — ASSESSMENT
[FreeTextEntry1] : Continue Flonase nasal spray, hold Astelin\par Albuterol HFA as needed for shortness of breath.\par Thoracic surgery follow-up with Dr. Corbin.\par Continue Levoxyl for hypothyroidism.

## 2022-03-16 NOTE — REASON FOR VISIT
[Follow-Up] : a follow-up visit [Cough] : cough [Shortness of Breath] : shortness of breath [TextBox_44] : Cough is under much better control now, Less SOB

## 2022-03-20 LAB — POCT - HEMOGLOBIN (HGB), QUANTITATIVE, TRANSCUTANEOUS: 11.5

## 2022-08-08 ENCOUNTER — OUTPATIENT (OUTPATIENT)
Dept: OUTPATIENT SERVICES | Facility: HOSPITAL | Age: 82
LOS: 1 days | End: 2022-08-08
Payer: MEDICARE

## 2022-08-08 ENCOUNTER — APPOINTMENT (OUTPATIENT)
Dept: CT IMAGING | Facility: HOSPITAL | Age: 82
End: 2022-08-08

## 2022-08-08 ENCOUNTER — RESULT REVIEW (OUTPATIENT)
Age: 82
End: 2022-08-08

## 2022-08-08 DIAGNOSIS — Z90.49 ACQUIRED ABSENCE OF OTHER SPECIFIED PARTS OF DIGESTIVE TRACT: Chronic | ICD-10-CM

## 2022-08-08 DIAGNOSIS — Z98.890 OTHER SPECIFIED POSTPROCEDURAL STATES: Chronic | ICD-10-CM

## 2022-08-08 DIAGNOSIS — C34.91 MALIGNANT NEOPLASM OF UNSPECIFIED PART OF RIGHT BRONCHUS OR LUNG: ICD-10-CM

## 2022-08-08 PROCEDURE — 71250 CT THORAX DX C-: CPT | Mod: 26,MH

## 2022-08-08 PROCEDURE — 71250 CT THORAX DX C-: CPT

## 2022-08-16 ENCOUNTER — APPOINTMENT (OUTPATIENT)
Dept: THORACIC SURGERY | Facility: CLINIC | Age: 82
End: 2022-08-16

## 2022-08-16 VITALS
OXYGEN SATURATION: 97 % | BODY MASS INDEX: 28.68 KG/M2 | HEIGHT: 64 IN | DIASTOLIC BLOOD PRESSURE: 68 MMHG | HEART RATE: 67 BPM | WEIGHT: 168 LBS | SYSTOLIC BLOOD PRESSURE: 109 MMHG | RESPIRATION RATE: 16 BRPM

## 2022-08-16 PROCEDURE — 99213 OFFICE O/P EST LOW 20 MIN: CPT

## 2022-08-19 NOTE — ASSESSMENT
[FreeTextEntry1] : 82 year old Mandarin speaking female, s/p FB, uniportal right VATS, right upper lobectomy with en bloc wedge resection of RML, and MLND on 1/10/18. Pathology revealed RUL T2aN0 adenocarcinoma, tumor involves RML, 1.2 x 0.8 x 0.5 cm, positive visceral pleura and lymph vascular invasion. She has completed chemotherapy with Dr. Davis (Oncology). \par \par She then underwent CT guided core biopsy of RML nodule on 3/21/19 with IR. Pathology was negative for malignant cells. \par \par CT Chest on 8/8/2022: \par - Post op changes \par - Stable 7 mm partially calcified nodule within periphery of MANDEEP\par -  Stable 1.3 cm air cyst within RLL \par \par I have reviewed the patient's medical records and diagnostic images at time of this office consultation and have made the following recommendation:\par 1. CT reviewed, stable scan. RTC in 6 mons with CXR \par \par \par I personally performed the services described in the documentation, reviewed the documentation recorded by the scribe in my presence and it accurately and completely records my words and actions. \par \par I, Jumana Beasley, MARYCRUZ, am scribing for and the presence of MIGUEL ANGEL Serrano, the following sections HISTORY OF PRESENT ILLNESS, PAST MEDICAL/FAMILY/SOCIAL HISTORY; REVIEW OF SYSTEMS; VITAL SIGNS; PHYSICAL EXAM; DISPOSITION.\par \par

## 2022-08-19 NOTE — CONSULT LETTER
[Dear  ___] : Dear  [unfilled], [Courtesy Letter:] : I had the pleasure of seeing your patient, [unfilled], in my office today. [Please see my note below.] : Please see my note below. [Sincerely,] : Sincerely, [FreeTextEntry2] : Ilya Alaniz MD (Med Onc)\par Brigitte Mitchell DO (Pulm)\par Nick Caba (PCP) \par  [FreeTextEntry3] : Javad Corbin MD, FACS \par Chief, Division of Thoracic Surgery \par Director, Minimally Invasive Thoracic Surgery \par Department of Cardiovascular and Thoracic Surgery \par [facility] \par , Cardiovascular and Thoracic Surgery\par \par \par

## 2022-08-19 NOTE — HISTORY OF PRESENT ILLNESS
[FreeTextEntry1] : 82 year old Mandarin speaking female, s/p FB, uniportal right VATS, right upper lobectomy with en bloc wedge resection of RML, and MLND on 1/10/18. Pathology revealed RUL T2aN0 adenocarcinoma, tumor involves RML, 1.2 x 0.8 x 0.5 cm, positive visceral pleura and lymph vascular invasion. She has completed chemotherapy with Dr. Davis (Oncology). \par \par She then underwent CT guided core biopsy of RML nodule on 3/21/19 with IR. Pathology was negative for malignant cells. \par \par CXR on 1/21/19 revealed:\par - There is right lung volume loss with postsurgical change including chain sutures and surgical clips. \par - There is a 4 mm peripheral left midlung nodule. \par - The lungs are otherwise clear. \par \par CT chest scan 7/8/2020:\par -4mm sized peripheral calcified nodule in the MANDEEP\par -No solid or gg nodules. \par -no evidence of recurrent or metastatic nodules. \par \par CXR on 01/28/2021:\par - post-op changes\par - stable 4 mm peripheral left midlung\par \par CT chest on 7/23/21:\par - RUL postop changes. \par - Unchanged, MANDEEP subpleural calcified nodules \par \par CXR on 02/07/2022:\par - The lungs demonstrate stable 5 mm nodular density in the left lower lobe. Linear subsegmental atelectatic changes are seen at the left base.\par \par CT Chest on 8/8/2022: \par - Post op changes \par - Stable 7 mm partially calcified nodule within periphery of MANDEEP\par -  Stable 1.3 cm air cyst within RLL \par \par Patient is here today for a follow up. Pt reports doing well, denies SOB, cough or CP.\par

## 2022-11-02 NOTE — REVIEW OF SYSTEMS
PRE-OP DATA and Check List - GI:  Procedure: Colonoscopy (41676)  Diagnosis: Positive colorectal cancer screening using Cologuard test [R19.5]  Tentative Date: 2-3 months  Tentative Time: To be determined  Duration of Procedure: 45 min  Anesthesia: MAC    Pre-Op Needed: yes     Do not take vitamins/herbal supplements for 7 days prior to procedure. This includes omega-3's, fish oils, and iron supplements.    Patient should hold NSAID such as ibuprofen, aleve, advil, meloxicam, naproxen, aspirin, etc for 3 days prior to procedure. It is okay to use tylenol during this time.    Do not take LOSARTAN for 24 hours prior to your procedure.   You may resume your normal medication regimen once your procedure is completed.     Do not take METFORMIN the morning of the procedure.    Do take METOPROLOL the morning of the procedure.      [Negative] : Heme/Lymph [As Noted in HPI] : as noted in HPI

## 2023-02-06 ENCOUNTER — APPOINTMENT (OUTPATIENT)
Dept: RADIOLOGY | Facility: HOSPITAL | Age: 83
End: 2023-02-06
Payer: MEDICARE

## 2023-02-06 ENCOUNTER — OUTPATIENT (OUTPATIENT)
Dept: OUTPATIENT SERVICES | Facility: HOSPITAL | Age: 83
LOS: 1 days | End: 2023-02-06
Payer: MEDICARE

## 2023-02-06 DIAGNOSIS — Z98.890 OTHER SPECIFIED POSTPROCEDURAL STATES: Chronic | ICD-10-CM

## 2023-02-06 DIAGNOSIS — Z90.49 ACQUIRED ABSENCE OF OTHER SPECIFIED PARTS OF DIGESTIVE TRACT: Chronic | ICD-10-CM

## 2023-02-06 DIAGNOSIS — C34.91 MALIGNANT NEOPLASM OF UNSPECIFIED PART OF RIGHT BRONCHUS OR LUNG: ICD-10-CM

## 2023-02-06 PROCEDURE — 71046 X-RAY EXAM CHEST 2 VIEWS: CPT

## 2023-02-06 PROCEDURE — 71046 X-RAY EXAM CHEST 2 VIEWS: CPT | Mod: 26

## 2023-02-14 ENCOUNTER — APPOINTMENT (OUTPATIENT)
Dept: THORACIC SURGERY | Facility: CLINIC | Age: 83
End: 2023-02-14
Payer: MEDICARE

## 2023-02-14 VITALS
BODY MASS INDEX: 28.82 KG/M2 | SYSTOLIC BLOOD PRESSURE: 132 MMHG | RESPIRATION RATE: 17 BRPM | OXYGEN SATURATION: 95 % | WEIGHT: 173 LBS | HEIGHT: 64.96 IN | HEART RATE: 70 BPM | DIASTOLIC BLOOD PRESSURE: 73 MMHG

## 2023-02-14 PROCEDURE — 99214 OFFICE O/P EST MOD 30 MIN: CPT

## 2023-02-14 RX ORDER — AZELASTINE HYDROCHLORIDE 137 UG/1
0.1 SPRAY, METERED NASAL TWICE DAILY
Qty: 3 | Refills: 3 | Status: COMPLETED | COMMUNITY
End: 2023-02-14

## 2023-02-14 RX ORDER — LEVOTHYROXINE SODIUM 50 UG/1
50 TABLET ORAL
Refills: 0 | Status: COMPLETED | COMMUNITY
End: 2023-02-14

## 2023-02-14 RX ORDER — TRAZODONE HYDROCHLORIDE 50 MG/1
50 TABLET ORAL
Qty: 30 | Refills: 0 | Status: COMPLETED | COMMUNITY
Start: 2020-10-16 | End: 2023-02-14

## 2023-02-14 RX ORDER — FLUTICASONE PROPIONATE 50 UG/1
50 SPRAY, METERED NASAL
Qty: 3 | Refills: 3 | Status: COMPLETED | COMMUNITY
Start: 2019-02-21 | End: 2023-02-14

## 2023-02-14 RX ORDER — SIMVASTATIN 40 MG/1
40 TABLET, FILM COATED ORAL
Refills: 0 | Status: COMPLETED | COMMUNITY
End: 2023-02-14

## 2023-02-14 NOTE — ASSESSMENT
[FreeTextEntry1] : 82 year old Mandarin speaking female, s/p FB, uniportal right VATS, right upper lobectomy with en bloc wedge resection of RML, and MLND on 1/10/18. Pathology revealed RUL T2aN0 adenocarcinoma, tumor involves RML, 1.2 x 0.8 x 0.5 cm, positive visceral pleura and lymph vascular invasion. She has completed chemotherapy with Dr. Davis (Oncology). \par \par She then underwent CT guided core biopsy of RML nodule on 3/21/19 with IR. Pathology was negative for malignant cells. \par \par Here today with follow up CXR\par \par I have reviewed the patient's medical records and diagnostic images at time of this office consultation and have made the following recommendation:\par 1. CXR reviewed and explained to patient, patient is doing well, patient would like to to return to office in 6 months with CT Chest without contrast. \par \par I, MIGUEL ANGEL Serrano, personally performed the evaluation and management (E/M) services for this established patient who follow up today with an existing condition.  That E/M includes conducting the examination, assessing all new/exacerbated/existing conditions, and establishing a plan of care.  Today, my ACP, STEPH Rhoades, was here to observe my evaluation and management services for this existing condition to be followed going forward. \par \par

## 2023-02-14 NOTE — CONSULT LETTER
[Dear  ___] : Dear  [unfilled], [Courtesy Letter:] : I had the pleasure of seeing your patient, [unfilled], in my office today. [Please see my note below.] : Please see my note below. [Sincerely,] : Sincerely, [FreeTextEntry2] : Ilya Alaniz MD (Med Onc)\par Brigtite Mitchell DO (Pulm)\par Nick Caba (PCP) \par  [FreeTextEntry3] : Javad Corbin MD, FACS \par Chief, Division of Thoracic Surgery \par Director, Minimally Invasive Thoracic Surgery \par Department of Cardiovascular and Thoracic Surgery \par [facility] \par , Cardiovascular and Thoracic Surgery\par \par \par

## 2023-02-14 NOTE — HISTORY OF PRESENT ILLNESS
[FreeTextEntry1] : 82 year old Mandarin speaking female, s/p FB, uniportal right VATS, right upper lobectomy with en bloc wedge resection of RML, and MLND on 1/10/18. Pathology revealed RUL T2aN0 adenocarcinoma, tumor involves RML, 1.2 x 0.8 x 0.5 cm, positive visceral pleura and lymph vascular invasion. She has completed chemotherapy with Dr. Davis (Oncology). \par \par She then underwent CT guided core biopsy of RML nodule on 3/21/19 with IR. Pathology was negative for malignant cells. \par \par CXR on 1/21/19 revealed:\par - There is right lung volume loss with postsurgical change including chain sutures and surgical clips. \par - There is a 4 mm peripheral left midlung nodule. \par - The lungs are otherwise clear. \par \par CT chest scan 7/8/2020:\par -4mm sized peripheral calcified nodule in the MANDEEP\par -No solid or gg nodules. \par -no evidence of recurrent or metastatic nodules. \par \par CXR on 01/28/2021:\par - post-op changes\par - stable 4 mm peripheral left midlung\par \par CT chest on 7/23/21:\par - RUL postop changes. \par - Unchanged, MANDEEP subpleural calcified nodules \par \par CXR on 02/07/2022:\par - The lungs demonstrate stable 5 mm nodular density in the left lower lobe. Linear subsegmental atelectatic changes are seen at the left base.\par \par CT Chest on 8/8/2022: \par - Post op changes \par - Stable 7 mm partially calcified nodule within periphery of MANDEEP\par -  Stable 1.3 cm air cyst within RLL \par \par CXR on 2/6/23: No focal consolidation is seen. Stable subcentimeter pulmonary nodule is seen in the LLL\par \par Patient is here today for a follow up. Patient c/o itchy throat, with white sputum, denies shortness of breath, cough, chest pain, fever, chills.

## 2023-03-16 ENCOUNTER — APPOINTMENT (OUTPATIENT)
Dept: PULMONOLOGY | Facility: CLINIC | Age: 83
End: 2023-03-16
Payer: MEDICARE

## 2023-03-16 VITALS — SYSTOLIC BLOOD PRESSURE: 108 MMHG | HEART RATE: 71 BPM | DIASTOLIC BLOOD PRESSURE: 69 MMHG | OXYGEN SATURATION: 99 %

## 2023-03-16 DIAGNOSIS — R09.82 POSTNASAL DRIP: ICD-10-CM

## 2023-03-16 DIAGNOSIS — R05.9 COUGH, UNSPECIFIED: ICD-10-CM

## 2023-03-16 PROCEDURE — 88738 HGB QUANT TRANSCUTANEOUS: CPT

## 2023-03-16 PROCEDURE — ZZZZZ: CPT

## 2023-03-16 PROCEDURE — 94729 DIFFUSING CAPACITY: CPT

## 2023-03-16 PROCEDURE — 94727 GAS DIL/WSHOT DETER LNG VOL: CPT

## 2023-03-16 PROCEDURE — 95012 NITRIC OXIDE EXP GAS DETER: CPT

## 2023-03-16 PROCEDURE — 94010 BREATHING CAPACITY TEST: CPT

## 2023-03-16 PROCEDURE — 99214 OFFICE O/P EST MOD 30 MIN: CPT | Mod: 25

## 2023-03-16 RX ORDER — FLUTICASONE PROPIONATE 50 UG/1
50 SPRAY, METERED NASAL
Qty: 3 | Refills: 3 | Status: ACTIVE | COMMUNITY
Start: 2023-03-16 | End: 1900-01-01

## 2023-03-17 NOTE — PROCEDURE
[FreeTextEntry1] : Pulmonary Function Test performed in my office today: Spirometry: Within normal limits; Lung Volume: Within normal limits; Diffusion: Moderate impairment.\par ______\par \par  Exhaled Nitric Oxide             Final\par \par No Documents Attached\par \par \par   Test   Result   Flag Reference Goal Last Verified \par   Exhaled Nitric Oxide 58      REQUIRED \par \par  Ordered by: SCOTT GONZALEZ       Collected/Examined: 16Mar2023 11:23AM       \par Verification Required       Stage: Final       \par  Performed at: In Office       Performed by: SCOTT GONZALEZ       Resulted: 16Mar2023 11:23AM       Last Updated: 16Mar2023 11:24AM       \par __________\par  CT Chest No Cont             Final\par \par No Documents Attached\par \par \par \par \par   ACC: [id number]     EXAM:  CT CHEST\par \par PROCEDURE DATE:  08/08/2022\par \par \par \par INTERPRETATION:  INDICATION: Lung cancer\par TECHNIQUE: Unenhanced CT of the chest. Coronal, sagittal, and MIP images were reconstructed and reviewed.\par COMPARISON: 7/23/2021 chest CT.\par \par FINDINGS:\par \par AIRWAYS, LUNGS and PLEURA: Patent central airways. Status post right upper lobectomy with stable postsurgical changes. Stable 7 mm partially calcified nodule within periphery of left upper lobe. Stable 1.3 cm air cyst within right lower lobe. The lungs are otherwise clear. No new or enlarging nodule.. No pleural effusion.\par \par MEDIASTINUM AND GILDA: No lymphadenopathy.\par \par HEART AND VESSELS: Heart size is normal. No pericardial effusion. Thoracic aorta and pulmonary artery normal in diameter. Coronary and mitral annulus calcifications.\par \par VISUALIZED UPPER ABDOMEN: Within normal limits.\par \par CHEST WALL AND BONES: No aggressive osseous lesion. Wedge deformity of L1 vertebral body is unchanged.\par \par LOWER NECK: Within normal limits.\par \par IMPRESSION:\par \par Status post right upper lobectomy with stable surgical changes. No new or enlarging nodule.\par \par --- End of Report ---\par \par \par \par \par \par NAVIN ARTIS MD; Attending Radiologist\par This document has been electronically signed. Aug 11 2022  8:22AM\par \par  \par \par  Ordered by: MIGUEL ANGEL WHITTEN       Collected/Examined: [id number] 11:23AM       \par Verification Required       Stage: Final       \par  Performed at: [facility]       Resulted: 11Aug2022 08:13AM       Last Updated: 11Aug2022 08:25AM       Accession: [id number]

## 2023-03-17 NOTE — ASSESSMENT
[FreeTextEntry1] : Continue Flonase nasal spray for postnasal drip\par Albuterol HFA as needed for shortness of breath.\par Thoracic surgery follow-up with Dr. Corbin.\par Continue Levoxyl for hypothyroidism.

## 2023-03-19 LAB — POCT - HEMOGLOBIN (HGB), QUANTITATIVE, TRANSCUTANEOUS: 13.7

## 2023-04-24 ENCOUNTER — APPOINTMENT (OUTPATIENT)
Dept: ORTHOPEDIC SURGERY | Facility: CLINIC | Age: 83
End: 2023-04-24
Payer: MEDICARE

## 2023-04-24 ENCOUNTER — NON-APPOINTMENT (OUTPATIENT)
Age: 83
End: 2023-04-24

## 2023-04-24 VITALS — HEART RATE: 70 BPM | WEIGHT: 165 LBS | BODY MASS INDEX: 27.49 KG/M2 | HEIGHT: 65 IN

## 2023-04-24 DIAGNOSIS — M54.50 LOW BACK PAIN, UNSPECIFIED: ICD-10-CM

## 2023-04-24 PROCEDURE — 72170 X-RAY EXAM OF PELVIS: CPT

## 2023-04-24 PROCEDURE — 99204 OFFICE O/P NEW MOD 45 MIN: CPT

## 2023-04-24 PROCEDURE — 72110 X-RAY EXAM L-2 SPINE 4/>VWS: CPT

## 2023-05-01 ENCOUNTER — OUTPATIENT (OUTPATIENT)
Dept: OUTPATIENT SERVICES | Facility: HOSPITAL | Age: 83
LOS: 1 days | End: 2023-05-01
Payer: MEDICARE

## 2023-05-01 ENCOUNTER — APPOINTMENT (OUTPATIENT)
Dept: MRI IMAGING | Facility: HOSPITAL | Age: 83
End: 2023-05-01
Payer: MEDICARE

## 2023-05-01 DIAGNOSIS — M84.48XS PATHOLOGICAL FRACTURE, OTHER SITE, SEQUELA: ICD-10-CM

## 2023-05-01 DIAGNOSIS — Z90.49 ACQUIRED ABSENCE OF OTHER SPECIFIED PARTS OF DIGESTIVE TRACT: Chronic | ICD-10-CM

## 2023-05-01 DIAGNOSIS — M54.16 RADICULOPATHY, LUMBAR REGION: ICD-10-CM

## 2023-05-01 DIAGNOSIS — M48.062 SPINAL STENOSIS, LUMBAR REGION WITH NEUROGENIC CLAUDICATION: ICD-10-CM

## 2023-05-01 DIAGNOSIS — Z98.890 OTHER SPECIFIED POSTPROCEDURAL STATES: Chronic | ICD-10-CM

## 2023-05-01 DIAGNOSIS — M41.80 OTHER FORMS OF SCOLIOSIS, SITE UNSPECIFIED: ICD-10-CM

## 2023-05-01 PROCEDURE — 72148 MRI LUMBAR SPINE W/O DYE: CPT | Mod: 26,MH

## 2023-05-01 PROCEDURE — 72148 MRI LUMBAR SPINE W/O DYE: CPT

## 2023-05-02 NOTE — HISTORY OF PRESENT ILLNESS
[Worsening] : worsening [8] : a current pain level of 8/10 [Constant] : ~He/She~ states the symptoms seem to be constant [Bending] : worsened by bending [Prolonged Sitting] : worsened by prolonged sitting [Standing] : worsened by standing [Walking] : worsened by walking [None] : No relieving factors are noted [___ yrs] : [unfilled] year(s) ago [de-identified] : Patient presents here today for initial evaluation of ongoing chronic back pain for 20 years worsening in the last 2 years. Patient reports pain level today is 8/10 in intensity.Patient also reports numbness and tingling on bilateral lower extremities. Patient states had Epidural injection in the past 2-3 years with some relief.\par chronic back pain and OA for decades\par Past 5+ yrs numbness and tingling in legs is worse\par Lifting leg has increased posterior thigh pain left worse than right\par 2 block ET

## 2023-05-02 NOTE — DISCUSSION/SUMMARY
[Medication Risks Reviewed] : Medication risks reviewed [de-identified] : Patient today presents for evaluation of chronic low back pain for 20+ years worsening over the past 2 years.  She has also reported increased numbness and tingling bilateral lower extremities.  She has had lumbar epidural steroid injection in the past 2 to 3 years with some relief.  At this point her symptoms suggestive of lumbar spinal stenosis with associated lumbar radiculopathy.  Recommended MRI lumbar spine which was prescribed for her today.  Gabapentin was also prescribed on a trial basis.  I will see her back after the MRI to discuss further treatment options as appropriate.  This may include additional epidural steroid injections.  If appropriate spinal stenosis identified surgical decompression with laminectomy may also be an option.\par \par The patient was educated regarding their condition, treatment options as well as prescribed course of treatment. \par Risks and benefits as well as alternatives to the proposed treatment were also provided to the patient \par They were given the opportunity to have all their questions answered to their satisfaction.\par \par Vital signs were reviewed with the patient and the patient was instructed to followup with their primary care provider for further management. There were no PAs or scribes used in the evaluation, exam or treatment plan discussion. The surgeon was the primary evaluating or treating physician as noted above.

## 2023-05-02 NOTE — PHYSICAL EXAM
[Stooped] : stooped [Cane] : ambulates with cane [SLR] : positive straight leg raise [] : Motor: [NL] : Motor strength of the right lower extremity was normal [___/5] : left ([unfilled]/5) [LE] : Sensory: Intact in bilateral lower extremities [0] : left ankle jerk 0 [DP] : dorsalis pedis 2+ and symmetric bilaterally [Plantar Reflex Right Only] : absent on the right [Plantar Reflex Left Only] : absent on the left [DTR Reflexes Clonus Of Right Ankle (___ Beats)] : absent on the right [DTR Reflexes Clonus Of Left Ankle (___ Beats)] : absent on the left [de-identified] : seen with her daughter\par The pt is awake, alert and oriented to self, place and time, is comfortable and in no acute distress. Inspection of neck, back and lower extremities bilaterally reveals no rashes or ecchymotic lesions.  There is no tenderness over the cervical, thoracic or lumbar spine, or the paraspinal or upper and lower extremities musculature. There is no sacroiliac tenderness. No greater trochanteric tenderness bilaterally. No atrophy or abnormal movements noted in the upper or lower extremities. There is no swelling noted in the upper or lower extremities bilaterally. No cervical lymphadenopathy noted anteriorly. No joint laxity noted in the upper and lower extremity joints bilaterally.\par Hip range of motion is degrees internal rotation 30° external rotation without pain. Full range of motion of the shoulders bilaterally with no significant pain\par There is no groin pain with hip internal rotation and a negative EDVIN test bilaterally.  [de-identified] : 4 views lumbar spine obtained today demonstrate right-sided thoracolumbar curve.  On the lateral projection hyperlordosis noted in the lumbar spine with increased thoracolumbar kyphosis.  Degeneration at the L1-2 level with suggestion of chronic compression deformity at L1.  No dynamic instability between flexion-extension.\par \par AP pelvis demonstrates normal appearance of the hips bilaterally.  Enthesopathy over the iliac crest bilaterally.  Also over the greater trochanter right more than left.

## 2023-05-12 ENCOUNTER — APPOINTMENT (OUTPATIENT)
Dept: ORTHOPEDIC SURGERY | Facility: CLINIC | Age: 83
End: 2023-05-12
Payer: MEDICARE

## 2023-05-12 DIAGNOSIS — M48.062 SPINAL STENOSIS, LUMBAR REGION WITH NEUROGENIC CLAUDICATION: ICD-10-CM

## 2023-05-12 DIAGNOSIS — M41.80 OTHER FORMS OF SCOLIOSIS, SITE UNSPECIFIED: ICD-10-CM

## 2023-05-12 DIAGNOSIS — M54.16 RADICULOPATHY, LUMBAR REGION: ICD-10-CM

## 2023-05-12 PROCEDURE — 99214 OFFICE O/P EST MOD 30 MIN: CPT | Mod: 95

## 2023-05-15 NOTE — HISTORY OF PRESENT ILLNESS
[Home] : at home, [unfilled] , at the time of the visit. [Verbal consent obtained from patient] : the patient, [unfilled] [Worsening] : worsening [8] : a current pain level of 8/10 [Bending] : worsened by bending [de-identified] : Patient presents here today via Telehealth visit to re evaluate low back  pain and for MRI of lumbar spine done 5/1/23 for review today\par Patient verbalized low back pain radiates to bilateral lower extremities with swelling on both legs and feet.\par  [de-identified] : laying down

## 2023-05-15 NOTE — DISCUSSION/SUMMARY
[Medication Risks Reviewed] : Medication risks reviewed [de-identified] : MRI lumbar spine performed previously was independent reviewed by me and findings discussed with the patient and her daughter.  She has severe spinal stenosis focally at the L4-5 level.  Moderate left and moderate to severe right foraminal narrowing.  Focal kyphosis at L1-2 with moderate spinal canal narrowing.  Her primary complaint is related to pathology at the L4-5 level.  She has difficulty walking. \par \par  I spoke with the patient and her daughter regarding possible surgical decompression with laminectomy at L4-5 level and she will call to schedule.  She understands epidural steroid injections may be considered though the will likely not be effective given the level of stenosis and her clinical presentation of difficulty walking.  The family may consider a follow-up visit to discuss surgical treatment person or may schedule an L4-5 laminectomy over the telephone.  Alternative surgery were also discussed including medications injections physical therapy chiropractic care and acupuncture as tolerated.

## 2023-05-15 NOTE — REASON FOR VISIT
[Follow-Up Visit] : a follow-up visit for [Back Pain] : back pain [Radiculopathy] : radiculopathy [Scoliosis] : scoliosis [Spinal Stenosis] : spinal stenosis [FreeTextEntry2] : Pathologic fracture of lumbar

## 2023-05-15 NOTE — PHYSICAL EXAM
[de-identified] : AAO X3\par Uncomfortable, NAD [de-identified] : \par ACC: [id number] EXAM: MR SPINE LUMBAR ORDERED BY: KENDY HORAN\par \par PROCEDURE DATE: 05/01/2023\par \par INTERPRETATION: CLINICAL INFORMATION: Chronic lower back pain. Leg pain. History of lung cancer.\par \par ADDITIONAL CLINICAL INFORMATION: Low back muscle strain S39.012A\par \par TECHNIQUE: Multiplanar, multisequence MRI was performed of the lumbar spine.\par IV Contrast: NONE\par \par PRIOR STUDIES: No priors available for comparison.\par \par FINDINGS:\par \par BONES: Edema within the right L5 pedicle, consistent with stress reaction.\par ALIGNMENT: Focal kyphosis at L1/L2. Multilevel loss of intervertebral disc height with endplate osteophyte formation. Mild asymmetric loss of intervertebral disc height on the right at L4/L5 and on the left at L1/L2.\par SACROILIAC JOINTS/SACRUM: There is no sacral fracture. The SI joints are partially visualized but are intact.\par CONUS AND CAUDA EQUINA: The distal cord and conus are normal in signal. Conus terminates at T12/L1.\par VISUALIZED INTRAPELVIC/INTRA-ABDOMINAL SOFT TISSUES: Normal.\par PARASPINAL SOFT TISSUES: Normal.\par \par \par INDIVIDUAL LEVELS:\par \par LOWER THORACIC SPINE: At T12/L1 with small disc bulge slightly asymmetric to the left bilateral facet arthrosis resulting mild spinal canal narrowing.\par \par L1-L2: Focal kyphosis with diffuse disc bulge slightly asymmetric to the right and bilateral facet arthrosis and thickening of the ligamentum flavum resulting in moderate spinal canal narrowing and minimal foraminal narrowing.\par L2-L3: Minimal disc bulge. No spinal canal narrowing. Mild foraminal narrowing.\par L3-L4: Mild disc bulge and bilateral facet arthrosis resulting in indentation of the thecal sac and moderate right foraminal narrowing.\par L4-L5: Diffuse disc bulge with superimposed central protrusion and advanced facet arthrosis and thickening of the ligamentum flavum resulting in severe spinal canal narrowing and moderate left and moderate to severe right foraminal narrowing.\par L5-S1: Mild small disc bulge resulting in indentation of the thecal sac and mild left and moderate right foraminal narrowing.\par \par \par IMPRESSION:\par Lumbar spondylosis at L4/L5, resulting in severe spinal canal narrowing and moderate left and moderate to severe right foraminal narrowing.\par Focal kyphosis at L1/L2 with spondylosis resulting in moderate spinal canal narrowing.\par \par --- End of Report ---\par \par AISSATOU GILL MD; Attending Radiologist\par This document has been electronically signed. May 5 2023 4:59PM

## 2023-05-18 ENCOUNTER — OUTPATIENT (OUTPATIENT)
Dept: OUTPATIENT SERVICES | Facility: HOSPITAL | Age: 83
LOS: 1 days | End: 2023-05-18
Payer: MEDICARE

## 2023-05-18 VITALS
WEIGHT: 164.91 LBS | TEMPERATURE: 98 F | SYSTOLIC BLOOD PRESSURE: 96 MMHG | OXYGEN SATURATION: 98 % | HEIGHT: 63 IN | HEART RATE: 71 BPM | RESPIRATION RATE: 14 BRPM | DIASTOLIC BLOOD PRESSURE: 56 MMHG

## 2023-05-18 DIAGNOSIS — Z98.890 OTHER SPECIFIED POSTPROCEDURAL STATES: Chronic | ICD-10-CM

## 2023-05-18 DIAGNOSIS — M41.80 OTHER FORMS OF SCOLIOSIS, SITE UNSPECIFIED: ICD-10-CM

## 2023-05-18 DIAGNOSIS — M54.16 RADICULOPATHY, LUMBAR REGION: ICD-10-CM

## 2023-05-18 DIAGNOSIS — Z90.49 ACQUIRED ABSENCE OF OTHER SPECIFIED PARTS OF DIGESTIVE TRACT: Chronic | ICD-10-CM

## 2023-05-18 DIAGNOSIS — Z01.818 ENCOUNTER FOR OTHER PREPROCEDURAL EXAMINATION: ICD-10-CM

## 2023-05-18 LAB
ALBUMIN SERPL ELPH-MCNC: 4 G/DL — SIGNIFICANT CHANGE UP (ref 3.3–5)
ALP SERPL-CCNC: 58 U/L — SIGNIFICANT CHANGE UP (ref 30–120)
ALT FLD-CCNC: 38 U/L DA — SIGNIFICANT CHANGE UP (ref 10–60)
ANION GAP SERPL CALC-SCNC: 10 MMOL/L — SIGNIFICANT CHANGE UP (ref 5–17)
APTT BLD: 30.7 SEC — SIGNIFICANT CHANGE UP (ref 27.5–35.5)
AST SERPL-CCNC: 27 U/L — SIGNIFICANT CHANGE UP (ref 10–40)
BILIRUB SERPL-MCNC: 0.5 MG/DL — SIGNIFICANT CHANGE UP (ref 0.2–1.2)
BLD GP AB SCN SERPL QL: SIGNIFICANT CHANGE UP
BUN SERPL-MCNC: 15 MG/DL — SIGNIFICANT CHANGE UP (ref 7–23)
CALCIUM SERPL-MCNC: 9.1 MG/DL — SIGNIFICANT CHANGE UP (ref 8.4–10.5)
CHLORIDE SERPL-SCNC: 104 MMOL/L — SIGNIFICANT CHANGE UP (ref 96–108)
CO2 SERPL-SCNC: 27 MMOL/L — SIGNIFICANT CHANGE UP (ref 22–31)
CREAT SERPL-MCNC: 1.25 MG/DL — SIGNIFICANT CHANGE UP (ref 0.5–1.3)
EGFR: 43 ML/MIN/1.73M2 — LOW
GLUCOSE SERPL-MCNC: 195 MG/DL — HIGH (ref 70–99)
HCT VFR BLD CALC: 33.3 % — LOW (ref 34.5–45)
HGB BLD-MCNC: 11.2 G/DL — LOW (ref 11.5–15.5)
INR BLD: 0.94 RATIO — SIGNIFICANT CHANGE UP (ref 0.88–1.16)
MCHC RBC-ENTMCNC: 32.9 PG — SIGNIFICANT CHANGE UP (ref 27–34)
MCHC RBC-ENTMCNC: 33.6 GM/DL — SIGNIFICANT CHANGE UP (ref 32–36)
MCV RBC AUTO: 97.9 FL — SIGNIFICANT CHANGE UP (ref 80–100)
NRBC # BLD: 0 /100 WBCS — SIGNIFICANT CHANGE UP (ref 0–0)
PLATELET # BLD AUTO: 134 K/UL — LOW (ref 150–400)
POTASSIUM SERPL-MCNC: 4.7 MMOL/L — SIGNIFICANT CHANGE UP (ref 3.5–5.3)
POTASSIUM SERPL-SCNC: 4.7 MMOL/L — SIGNIFICANT CHANGE UP (ref 3.5–5.3)
PROT SERPL-MCNC: 7.7 G/DL — SIGNIFICANT CHANGE UP (ref 6–8.3)
PROTHROM AB SERPL-ACNC: 11.1 SEC — SIGNIFICANT CHANGE UP (ref 10.5–13.4)
RBC # BLD: 3.4 M/UL — LOW (ref 3.8–5.2)
RBC # FLD: 12.8 % — SIGNIFICANT CHANGE UP (ref 10.3–14.5)
SODIUM SERPL-SCNC: 141 MMOL/L — SIGNIFICANT CHANGE UP (ref 135–145)
WBC # BLD: 3.31 K/UL — LOW (ref 3.8–10.5)
WBC # FLD AUTO: 3.31 K/UL — LOW (ref 3.8–10.5)

## 2023-05-18 PROCEDURE — 93005 ELECTROCARDIOGRAM TRACING: CPT

## 2023-05-18 PROCEDURE — 93010 ELECTROCARDIOGRAM REPORT: CPT

## 2023-05-18 PROCEDURE — G0463: CPT

## 2023-05-18 RX ORDER — ASPIRIN/CALCIUM CARB/MAGNESIUM 324 MG
1 TABLET ORAL
Qty: 0 | Refills: 0 | DISCHARGE

## 2023-05-18 RX ORDER — METOCLOPRAMIDE HCL 10 MG
1 TABLET ORAL
Qty: 0 | Refills: 0 | DISCHARGE

## 2023-05-18 RX ORDER — FOLIC ACID 0.8 MG
1 TABLET ORAL
Qty: 0 | Refills: 0 | DISCHARGE

## 2023-05-18 RX ORDER — SIMVASTATIN 20 MG/1
1 TABLET, FILM COATED ORAL
Qty: 0 | Refills: 0 | DISCHARGE

## 2023-05-18 RX ORDER — DEXAMETHASONE 0.5 MG/5ML
1 ELIXIR ORAL
Qty: 0 | Refills: 0 | DISCHARGE

## 2023-05-18 NOTE — H&P PST ADULT - NSICDXPASTMEDICALHX_GEN_ALL_CORE_FT
PAST MEDICAL HISTORY:  Adenocarcinoma of right lung - s/p VATS 1/2018 - chemotherapy (current)    Arthritis     Diabetes type 2, controlled     Hypercholesterolemia     Hypertension     Hypothyroid     Insomnia     Language barrier native language Mandarin, Chinese; comprehends and verbalizes no Englsih    Lung cancer     Nephrolithiasis 9 mm kidney stone - left proximal ureter    Obesity     Paroxysmal atrial fibrillation diagnosed 1/2018    Sciatica injection done in Dec. 2017    Seasonal allergies     Snoring BETITO precautions -- responds affirmatively to STOP BANG questionnaire -- admits to loud snoring; age > 50; h/o htn    Vitamin D deficiency      PAST MEDICAL HISTORY:  Adenocarcinoma of right lung - s/p VATS 1/2018 - chemotherapy (current)    Arthritis     Diabetes type 2, controlled     Hypercholesterolemia     Hypertension     Hypothyroid     Insomnia     Language barrier native language Mandarin, Chinese; comprehends and verbalizes no Englsih    Lumbar radiculopathy     Lung cancer     Nephrolithiasis 9 mm kidney stone - left proximal ureter    Obesity     Paroxysmal atrial fibrillation diagnosed 1/2018    Sciatica injection done in Dec. 2017    Seasonal allergies     Snoring BETITO precautions -- responds affirmatively to STOP BANG questionnaire -- admits to loud snoring; age > 50; h/o htn    Vitamin D deficiency

## 2023-05-18 NOTE — H&P PST ADULT - HISTORY OF PRESENT ILLNESS
84 y/o female , mandarian speaking with PMH of adenocarcinoma of lung, s/p surgery, type 2 diabetes, A-fib with many years of back pain. Denies any acute injury. Failed conservative threrapy. Pain 10/10 pain all time. Was recommended surgery 8 years ago. Because of chronic pain was consulted with Dr Cross , who advised surgery 82 y/o female , Mandarian speaking with PMH of adenocarcinoma of lung, s/p surgery, type 2 diabetes, A-fib with many years of back pain. Denies any acute injury. Failed conservative threrapy. Pain 10/10 pain all time. Was recommended surgery 8 years ago. Because of chronic pain was consulted with Dr Cross , who advised surgery.

## 2023-05-18 NOTE — H&P PST ADULT - NSICDXPASTSURGICALHX_GEN_ALL_CORE_FT
PAST SURGICAL HISTORY:  H/O: hysterectomy 1985 -menorrhagia, benign disease    History of lung surgery 1/10/18 - unilateral right VATS RU lobectomy with en bloc wedge resection of RML, MLND, and intercostal nerve block    S/P appendectomy greater than 30 years ago

## 2023-05-18 NOTE — H&P PST ADULT - ASSESSMENT
84 y/o female with lower back pain  Planned surgery.- lumbar laminectomy  Will obtain medical clearance  /cardiac clearance  Cardi-Thoracic and pulmonary consult in chart  Family was not aware of obtaining clearances' Ama-Daughter at bedside  Provided [facility] Access number  Explained and insisted the need for clearances  Pre op instructions provided  Instructions provided on medications to continue and to take the day morning of surgery

## 2023-05-19 LAB
A1C WITH ESTIMATED AVERAGE GLUCOSE RESULT: 6.6 % — HIGH (ref 4–5.6)
ESTIMATED AVERAGE GLUCOSE: 143 MG/DL — HIGH (ref 68–114)

## 2023-05-22 ENCOUNTER — NON-APPOINTMENT (OUTPATIENT)
Age: 83
End: 2023-05-22

## 2023-05-26 NOTE — PROVIDER CONTACT NOTE (OTHER) - ASSESSMENT
The spine pre-operative education packet was mailed to the patient on 5/15/23. The patient reviewed the information included in the packet. She called the office and reviewed the information with the NP. All her questions were answered, and she gave a clear understanding of the instructions. She was advised to call the office at any time with further questions or concerns.

## 2023-05-31 ENCOUNTER — TRANSCRIPTION ENCOUNTER (OUTPATIENT)
Age: 83
End: 2023-05-31

## 2023-05-31 PROBLEM — M54.16 RADICULOPATHY, LUMBAR REGION: Chronic | Status: ACTIVE | Noted: 2023-05-18

## 2023-05-31 PROBLEM — E11.9 TYPE 2 DIABETES MELLITUS WITHOUT COMPLICATIONS: Chronic | Status: ACTIVE | Noted: 2023-05-18

## 2023-06-01 ENCOUNTER — OUTPATIENT (OUTPATIENT)
Dept: OUTPATIENT SERVICES | Facility: HOSPITAL | Age: 83
LOS: 1 days | End: 2023-06-01
Payer: MEDICARE

## 2023-06-01 ENCOUNTER — APPOINTMENT (OUTPATIENT)
Dept: ORTHOPEDIC SURGERY | Facility: HOSPITAL | Age: 83
End: 2023-06-01

## 2023-06-01 ENCOUNTER — TRANSCRIPTION ENCOUNTER (OUTPATIENT)
Age: 83
End: 2023-06-01

## 2023-06-01 ENCOUNTER — RESULT REVIEW (OUTPATIENT)
Age: 83
End: 2023-06-01

## 2023-06-01 VITALS
RESPIRATION RATE: 10 BRPM | HEART RATE: 73 BPM | HEIGHT: 64.96 IN | DIASTOLIC BLOOD PRESSURE: 56 MMHG | OXYGEN SATURATION: 98 % | WEIGHT: 164.91 LBS | SYSTOLIC BLOOD PRESSURE: 131 MMHG | TEMPERATURE: 98 F

## 2023-06-01 VITALS
RESPIRATION RATE: 20 BRPM | DIASTOLIC BLOOD PRESSURE: 77 MMHG | OXYGEN SATURATION: 99 % | SYSTOLIC BLOOD PRESSURE: 126 MMHG | HEART RATE: 75 BPM

## 2023-06-01 DIAGNOSIS — M54.16 RADICULOPATHY, LUMBAR REGION: ICD-10-CM

## 2023-06-01 DIAGNOSIS — Z01.818 ENCOUNTER FOR OTHER PREPROCEDURAL EXAMINATION: ICD-10-CM

## 2023-06-01 DIAGNOSIS — M41.80 OTHER FORMS OF SCOLIOSIS, SITE UNSPECIFIED: ICD-10-CM

## 2023-06-01 DIAGNOSIS — Z98.890 OTHER SPECIFIED POSTPROCEDURAL STATES: Chronic | ICD-10-CM

## 2023-06-01 DIAGNOSIS — Z90.49 ACQUIRED ABSENCE OF OTHER SPECIFIED PARTS OF DIGESTIVE TRACT: Chronic | ICD-10-CM

## 2023-06-01 LAB
ABO RH CONFIRMATION: SIGNIFICANT CHANGE UP
GLUCOSE BLDC GLUCOMTR-MCNC: 100 MG/DL — HIGH (ref 70–99)
GLUCOSE BLDC GLUCOMTR-MCNC: 138 MG/DL — HIGH (ref 70–99)

## 2023-06-01 PROCEDURE — 88311 DECALCIFY TISSUE: CPT

## 2023-06-01 PROCEDURE — 97161 PT EVAL LOW COMPLEX 20 MIN: CPT

## 2023-06-01 PROCEDURE — 88304 TISSUE EXAM BY PATHOLOGIST: CPT | Mod: 26

## 2023-06-01 PROCEDURE — C1889: CPT

## 2023-06-01 PROCEDURE — 36415 COLL VENOUS BLD VENIPUNCTURE: CPT

## 2023-06-01 PROCEDURE — 63047 LAM FACETEC & FORAMOT LUMBAR: CPT

## 2023-06-01 PROCEDURE — 76000 FLUOROSCOPY <1 HR PHYS/QHP: CPT

## 2023-06-01 PROCEDURE — 82962 GLUCOSE BLOOD TEST: CPT

## 2023-06-01 PROCEDURE — 88311 DECALCIFY TISSUE: CPT | Mod: 26

## 2023-06-01 PROCEDURE — 63047 LAM FACETEC & FORAMOT LUMBAR: CPT | Mod: AS

## 2023-06-01 PROCEDURE — 88304 TISSUE EXAM BY PATHOLOGIST: CPT

## 2023-06-01 PROCEDURE — 63048 LAM FACETEC &FORAMOT EA ADDL: CPT

## 2023-06-01 DEVICE — SURGIFOAM PAD 8CM X 12.5CM X 10MM (100): Type: IMPLANTABLE DEVICE | Status: FUNCTIONAL

## 2023-06-01 DEVICE — SURGIFLO HEMOSTATIC MATRIX KIT: Type: IMPLANTABLE DEVICE | Status: FUNCTIONAL

## 2023-06-01 DEVICE — BONE WAX 2.5GM: Type: IMPLANTABLE DEVICE | Status: FUNCTIONAL

## 2023-06-01 RX ORDER — LEVOTHYROXINE SODIUM 125 MCG
75 TABLET ORAL DAILY
Refills: 0 | Status: DISCONTINUED | OUTPATIENT
Start: 2023-06-01 | End: 2023-06-15

## 2023-06-01 RX ORDER — APREPITANT 80 MG/1
40 CAPSULE ORAL ONCE
Refills: 0 | Status: COMPLETED | OUTPATIENT
Start: 2023-06-01 | End: 2023-06-01

## 2023-06-01 RX ORDER — ASPIRIN/CALCIUM CARB/MAGNESIUM 324 MG
1 TABLET ORAL
Qty: 0 | Refills: 0 | DISCHARGE

## 2023-06-01 RX ORDER — ONDANSETRON 8 MG/1
4 TABLET, FILM COATED ORAL ONCE
Refills: 0 | Status: DISCONTINUED | OUTPATIENT
Start: 2023-06-01 | End: 2023-06-01

## 2023-06-01 RX ORDER — GLYBURIDE 5 MG
1 TABLET ORAL
Qty: 0 | Refills: 0 | DISCHARGE

## 2023-06-01 RX ORDER — ZOLPIDEM TARTRATE 10 MG/1
0.5 TABLET ORAL
Qty: 0 | Refills: 0 | DISCHARGE

## 2023-06-01 RX ORDER — HYDROCHLOROTHIAZIDE 25 MG
1 TABLET ORAL
Refills: 0 | DISCHARGE

## 2023-06-01 RX ORDER — ASPIRIN/CALCIUM CARB/MAGNESIUM 324 MG
81 TABLET ORAL DAILY
Refills: 0 | Status: DISCONTINUED | OUTPATIENT
Start: 2023-06-03 | End: 2023-06-15

## 2023-06-01 RX ORDER — OXYCODONE HYDROCHLORIDE 5 MG/1
5 TABLET ORAL ONCE
Refills: 0 | Status: DISCONTINUED | OUTPATIENT
Start: 2023-06-01 | End: 2023-06-01

## 2023-06-01 RX ORDER — SODIUM CHLORIDE 9 MG/ML
1000 INJECTION, SOLUTION INTRAVENOUS
Refills: 0 | Status: DISCONTINUED | OUTPATIENT
Start: 2023-06-01 | End: 2023-06-01

## 2023-06-01 RX ORDER — ACETAMINOPHEN 500 MG
1000 TABLET ORAL ONCE
Refills: 0 | Status: COMPLETED | OUTPATIENT
Start: 2023-06-01 | End: 2023-06-01

## 2023-06-01 RX ORDER — METFORMIN HYDROCHLORIDE 850 MG/1
500 TABLET ORAL DAILY
Refills: 0 | Status: DISCONTINUED | OUTPATIENT
Start: 2023-06-01 | End: 2023-06-15

## 2023-06-01 RX ORDER — LEVOTHYROXINE SODIUM 125 MCG
1 TABLET ORAL
Qty: 0 | Refills: 0 | DISCHARGE

## 2023-06-01 RX ORDER — HYDROMORPHONE HYDROCHLORIDE 2 MG/ML
0.25 INJECTION INTRAMUSCULAR; INTRAVENOUS; SUBCUTANEOUS
Refills: 0 | Status: DISCONTINUED | OUTPATIENT
Start: 2023-06-01 | End: 2023-06-01

## 2023-06-01 RX ORDER — HYDROMORPHONE HYDROCHLORIDE 2 MG/ML
0.5 INJECTION INTRAMUSCULAR; INTRAVENOUS; SUBCUTANEOUS
Refills: 0 | Status: DISCONTINUED | OUTPATIENT
Start: 2023-06-01 | End: 2023-06-01

## 2023-06-01 RX ORDER — DILTIAZEM HCL 120 MG
1 CAPSULE, EXT RELEASE 24 HR ORAL
Refills: 0 | DISCHARGE

## 2023-06-01 RX ORDER — METFORMIN HYDROCHLORIDE 850 MG/1
1 TABLET ORAL
Refills: 0 | DISCHARGE

## 2023-06-01 RX ORDER — CEFAZOLIN SODIUM 1 G
2000 VIAL (EA) INJECTION ONCE
Refills: 0 | Status: COMPLETED | OUTPATIENT
Start: 2023-06-01 | End: 2023-06-01

## 2023-06-01 RX ORDER — ATORVASTATIN CALCIUM 80 MG/1
40 TABLET, FILM COATED ORAL AT BEDTIME
Refills: 0 | Status: DISCONTINUED | OUTPATIENT
Start: 2023-06-01 | End: 2023-06-15

## 2023-06-01 RX ORDER — DILTIAZEM HCL 120 MG
180 CAPSULE, EXT RELEASE 24 HR ORAL DAILY
Refills: 0 | Status: DISCONTINUED | OUTPATIENT
Start: 2023-06-01 | End: 2023-06-15

## 2023-06-01 RX ORDER — METOPROLOL TARTRATE 50 MG
25 TABLET ORAL
Refills: 0 | Status: DISCONTINUED | OUTPATIENT
Start: 2023-06-01 | End: 2023-06-15

## 2023-06-01 RX ORDER — METFORMIN HYDROCHLORIDE 850 MG/1
1 TABLET ORAL
Qty: 0 | Refills: 0 | DISCHARGE

## 2023-06-01 RX ORDER — ATORVASTATIN CALCIUM 80 MG/1
1 TABLET, FILM COATED ORAL
Refills: 0 | DISCHARGE

## 2023-06-01 RX ADMIN — APREPITANT 40 MILLIGRAM(S): 80 CAPSULE ORAL at 06:57

## 2023-06-01 NOTE — ASU PATIENT PROFILE, ADULT - NSICDXPASTMEDICALHX_GEN_ALL_CORE_FT
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 2017    Seasonal allergies     Snoring BETITO precautions -- responds affirmatively to STOP BANG questionnaire -- admits to loud snoring; age > 50; h/o htn    Vitamin D deficiency

## 2023-06-01 NOTE — PHYSICAL THERAPY INITIAL EVALUATION ADULT - NSACTIVITYREC_GEN_A_PT
Pt requires Sup x 1 for bed mobility, transfers, amb with RW. Pt dtr present throughout duration of PT eval, pt/pt dtr edu on spinal precautions, proper body mechanics throughout mobility. Pt edu on stair negotiation simulation and proper assist. Pt recommended to use RW for transfers and ambulation. Pt cleared from PT services with assist, no anticipated skilled needs upon d/c.

## 2023-06-01 NOTE — ASU DISCHARGE PLAN (ADULT/PEDIATRIC) - CARE PROVIDER_API CALL
Joselito Cross [facility]  Spine Surgery  [address], [address]  [place] [address]  Phone: [phone]  Fax: [phone]  Follow Up Time: 2 weeks

## 2023-06-01 NOTE — ASU PATIENT PROFILE, ADULT - BRADEN SCORE (IF 18 OR LESS ACTIVATE SKIN INJURY RISK INCREASED GUIDELINE), MLM
Patient stated he attempted to get the platelet inhibitor lab drawn at an outpatient lab other than [facility]  Informed patient that this test can only be done at [facility], patient stated he was aware of this, but refuses to go to [facility] "an hour away", only agreeable to [facility]  Called Александр BERUMEN @ [facility] lab and she confirmed they only can do it at the [facility], no longer able to do it at [facility] due to falsified results  [address]  outpatient lab and spoke to Sydnee Jean-Baptiste  She stated she isn't sure if they are able to do this test, and will have to call us back (she did mention they most likely would send it out)  Waiting for a call back  Please advise  Patient requesting a call back  (May leave a detailed message, however patient stated he doesn't check his vm frequently)  20

## 2023-06-01 NOTE — PHYSICAL THERAPY INITIAL EVALUATION ADULT - ADDITIONAL COMMENTS
Pt resides in apartment with spouse, however will be staying with pt dtr and family upon d/c to assist as needed. Pt denies OBDULIO/stairs throughout apartment has elevator, states stairs at pt dtrs house. Pt has RW and cane. Pt reports was independent prior to admission with use of cane.

## 2023-06-01 NOTE — ASU PREOP CHECKLIST - PATIENT SENT TO
BMI:   Wt Readings from Last 3 Encounters:   10/31/17 252 lb (114.3 kg)     Body mass index is 32.35 kg/m². CBC: No results found for: WBC, RBC, HGB, HCT, MCV, RDW, PLT    CMP: No results found for: NA, K, CL, CO2, BUN, CREATININE, GFRAA, AGRATIO, LABGLOM, GLUCOSE, PROT, CALCIUM, BILITOT, ALKPHOS, AST, ALT    POC Tests: No results for input(s): POCGLU, POCNA, POCK, POCCL, POCBUN, POCHEMO, POCHCT in the last 72 hours. Coags: No results found for: PROTIME, INR, APTT    HCG (If Applicable): No results found for: PREGTESTUR, PREGSERUM, HCG, HCGQUANT     ABGs: No results found for: PHART, PO2ART, GYJ0NON, GBN5BQC, BEART, [id number]     Type & Screen (If Applicable):  No results found for: LABABO, [address]    Anesthesia Evaluation  Patient summary reviewed and Nursing notes reviewed no history of anesthetic complications:   Airway: Mallampati: II  TM distance: >3 FB   Neck ROM: full  Mouth opening: > = 3 FB Dental:          Pulmonary:normal exam                               Cardiovascular:             Beta Blocker:  Not on Beta Blocker         Neuro/Psych:   {neg ROS  (+) psychiatric history: stable with treatment            GI/Hepatic/Renal:        (-) GERD       Endo/Other: negative ROS       (-) no Type II DM               Abdominal:           Vascular:                                      Anesthesia Plan      general and regional     ASA 2     (Sciatic n block)  Induction: intravenous. MIPS: Postoperative opioids intended and Prophylactic antiemetics administered. Anesthetic plan and risks discussed with patient and mother. Plan discussed with CRNA.                   Alfonso Yanes MD   11/2/2017 operating room

## 2023-06-01 NOTE — PHYSICAL THERAPY INITIAL EVALUATION ADULT - MANUAL MUSCLE TESTING RESULTS, REHAB EVAL
November 7, 2022       Luz Wilson MD  [id number]  [facility] [address]  Via Fax: [phone]      Patient: Milind Samuel   YOB: 1937   Date of Visit: 11/7/2022       Dear Dr. Wilson:    Thank you for referring Milind Samuel to me for evaluation. Below are my notes for this visit with him.    If you have questions, please do not hesitate to call me. I look forward to following your patient along with you.      Sincerely,        Misha Pillai CNP        CC: No Recipients  Misha Pillai CNP  11/7/2022  3:23 PM  Signed  HEART FAILURE CLINIC NOTE  Chief Complaint   Patient presents with   • Congestive Heart Failure     Initial visit        Accompanied By: family member, daughter, Marichuy  :  services used: Yes daughter translated and spoke for pt and pt spoke some words in English to answer questions  Referred by: Dr. REGAN Belle    Patient’s cardiologist is Dr. REGAN Belle, last seen 10/14/2022. Patient’s primary care provider is Luz Wilson MD, last seen every 3 months fu.    History of Present Illness    85 year old male    Recent ER/Hospitalizations/Procedures:  Here for CHF management.  Recent echo 10/2022 EF 50%, peak pressure 42 mmHg.  BNP 2800 down to 1700.   BMP from today still pending.  Small L pleural effusion on CXR.     Pt went to [facility] and was not taking his Lasix regularly and when he came home, he still wasn't taking it.  Pt usually takes it about 3 times per week and daughter concerned with worsening kidney fxn on the water pill.  Recent Cr stable 1.20.    Pt was also suppose to have LHC 10/19/22 but was CAN due to fluid overload.  Daughter not sure if pt still needs to have done.  Pt denies CP.     Underwent a Lexiscan in January 2018 which showed a large reversible anterior and lateral wall defect.  He underwent a LHC on 1/30/2018 which revealed an EF 55-60% and showed severe 3v CAD with a patent degenerated VG to the OM, patent vein  graft to RCA, and patent LIMA to LAD.  Medical therapy was recommended at the time.    Repeat lexiscan (6/2020) again shows moderate to large reversible defect in the anterolateral wall suggestive of ischemia.  -Reversible ischemia on stress test from January 2018 is stable to current stress test.  Left heart cath January 2018 revealed disease of SVG/OM/circumflex not amenable to percutaneous intervention    Pt also fu with heme for low platelets.  Fu 6 weeks with more labs.      H/o HTN, CAD, CABG x3 2010, PVD, CVA, carotid stenosis s/p endarterectomy right 2/2013, HLD, obesity, DM.      HF Symptom Assessment:   • General: takes daily naps pt walks around a lot  • Dizziness/lightheadedness: denies, denies falls or syncope  • Shortness of breath: improved  • Cough: denies  • Chest pain: denies  • Palpitations: denies  • Orthopnea: denies, 2 pillow  • PND: denies  • LE edema: stable, does not try to resolve with elevation, does not use compression stockings, but does have them at home, does walk regularly  • GI: denies nausea, denies abdominal distention, denies early satiety, denies lack of appetite and denies bloating    The patient does not has a device.   The patient has not been evaluated for RADHA. See STOP BANG screen. 4.    Home Monitoring/Diet/Exercise:  • Daily weights: adherent, 169 lbs at home, lost about 9 lbs in office in 3 weeks.  • Blood pressure monitoring: adherent, readings are at goal in office and has home cuff  • Sodium restriction: adherent  • Fluid restriction: adherent, 64 ounces, some coffee and water  • Exercise: light exercise    Smoking Hx  denies  ETOH Hx  denies  Drug Hx  denies    Pt lives with wife and daughter, who also helps manage medications.    Pt received COVID Vaccines.       ROS  Review of Systems   Constitutional: Negative for fever.        Past Medical History  Past Medical History:   Diagnosis Date   • CAD (coronary artery disease)    • Cardiomyopathy (CMS/HCC)    • CHF  (congestive heart failure) (CMS/[facility])    • Diabetes (CMS/[facility])    • H/O carotid endarterectomy     Right  Feb 20113   • Hx of CABG 2010   • Hyperlipidemia    • Hypertension    • PVD (peripheral vascular disease) (CMS/[facility])        Surgical History  Past Surgical History:   Procedure Laterality Date   • Coronary artery bypass graft  2010   • Endarterectomy  02/2013       Family History  Family History   Problem Relation Age of Onset   • Pacemaker Brother         Cardiac Cath   • Cancer, Prostate Brother        Social History  Social History     Socioeconomic History   • Marital status: /Civil Union     Spouse name: Not on file   • Number of children: Not on file   • Years of education: Not on file   • Highest education level: Not on file   Occupational History   • Not on file   Tobacco Use   • Smoking status: Never Smoker   • Smokeless tobacco: Never Used   Substance and Sexual Activity   • Alcohol use: Never   • Drug use: Never   • Sexual activity: Yes     Partners: Female   Other Topics Concern   • Not on file   Social History Narrative   • Not on file     Social Determinants of Health     Financial Resource Strain: Not on file   Food Insecurity: Not on file   Transportation Needs: Not on file   Physical Activity: Not on file   Stress: Not on file   Social Connections: Not on file   Intimate Partner Violence: Not on file       Alcohol Use: Never             Drug Use:    Never             Allergies  ALLERGIES:   Allergen Reactions   • Metformin Hcl DIARRHEA       Presenting Meds  Current Outpatient Medications   Medication Sig Dispense Refill   • Cholecalciferol (vitamin D3) 25 mcg (1,000 units) capsule      • isosorbide dinitrate (ISORDIL) 20 MG tablet TAKE 1 TABLET BY MOUTH IN THE MORNING AND IN THE EVENING 180 tablet 0   • cilostazol (PLETAL) 50 MG tablet Take 1 tablet by mouth 2 times daily. 180 tablet 3   • furosemide (LASIX) 20 MG tablet Take 20 mg by mouth daily.     • potassium CHLORIDE 10 MEQ ER tablet  Take 10 mEq by mouth daily.     • metoPROLOL succinate (TOPROL-XL) 50 MG 24 hr tablet Take 50 mg by mouth daily.     • enalapril (VASOTEC) 2.5 MG tablet Take 1 tablet by mouth daily. 90 tablet 3   • meclizine (ANTIVERT) 12.5 MG tablet 12.5 mg 3 times daily as needed.      • blood glucose test strip every 6 hours.     • ACCU-CHEK DAKOTA PLUS test strip TEST TID UTD  6   • LEVEMIR 100 UNIT/ML injectable solution 40 units every morning and 10 units every evening  3   • atorvastatin (LIPITOR) 80 MG tablet Take 80 mg by mouth daily.     • Cyanocobalamin (VITAMIN B 12 PO)      • Insulin Lispro (HUMALOG KWIKPEN SC)      • aspirin 81 MG tablet Take 1 tablet by mouth daily. 90 tablet 3     No current facility-administered medications for this visit.       Visit Vitals  /60   Pulse 64   Wt 78 kg (172 lb)   BMI 31.46 kg/m²     Wt Readings from Last 4 Encounters:   11/07/22 78 kg (172 lb)   10/14/22 82.1 kg (181 lb)   07/01/22 79.8 kg (176 lb)   03/17/22 79 kg (174 lb 2.6 oz)     BP Readings from Last 4 Encounters:   11/07/22 132/60   10/14/22 138/62   07/01/22 (!) 162/60   03/17/22 (!) 151/92   ]  Pulse Readings from Last 4 Encounters:   11/07/22 64   10/14/22 64   07/01/22 68   03/17/22 65   ]    Physical Exam  Vitals reviewed.   Constitutional:       General: He is not in acute distress.     Appearance: He is obese.   Cardiovascular:      Rate and Rhythm: Normal rate and regular rhythm.   Pulmonary:      Effort: No respiratory distress.      Breath sounds: No wheezing.   Musculoskeletal:         General: Swelling present.      Comments: Mild bilat LE edema, no sores or drainage   Skin:     General: Skin is dry.   Neurological:      Mental Status: He is alert.   Psychiatric:         Mood and Affect: Mood normal.           Results/Data    Most Recent Cardiac Diagnostics:    EKG:  See scan 10/14/22    Echocardiogram:10/2022  ------------------------------------------------------------------------------  STUDY  CONCLUSIONS  SUMMARY:     1. Left ventricle: The cavity size is normal. Mildly increased ventricular     mass. There is concentric hypertrophy. The ejection fraction was measured     by visual estimation. Hypokinesis of the apicalanterior wall. The ejection     fraction is 50%.  2. Aortic valve: The annulus is mildly calcified. The valve is trileaflet. The     leaflets are mildly thickened.  3. Left atrium: The atrium is moderately dilated.  4. Right ventricle: The cavity size is normal. Wall thickness is normal.     Systolic function is normal. Systolic pressure is mildly increased. The     estimated peak pressure is 42mm Hg.    TTE 6/23/2020  STUDY CONCLUSIONS  SUMMARY:   Left ventricle: The cavity size is normal. Wall thickness is  mildly increased. Systolic function is normal. The estimated ejection  fraction is 55-60%, by single plane method of disks. Doppler parameters  are consistent with abnormal left ventricular relaxation (grade 1  diastolic dysfunction).    Stress study:    Lexiscan 6/24/2020  IMPRESSION:     1. No symptoms of angina occurred.  2. There were worsening of baseline ST-T abnormalities.  3. There were no significant dysrhythmias noted.  4. There was moderate to large reversible defect in the anterolateral wall suggestive of   ischemia.  5. Borderline normal left ventricular function with lateral wall hypokinesia.     Risk/Extent for Ischemia is high.    Left Heart Cath: 1/2018  FINAL IMPRESSION:  1. Severe native 3-vessel coronary disease.  2. Normal LV function.  3. Patent degenerated vein graft to obtuse marginal branch of left circumflex  artery.  4. Patent vein graft right coronary artery.  5. Patent LIMA to left descending artery.     RECOMMENDATION:  Maximize medical therapy.    Right Heart Cath:      Most Recent Lab Results:   11/2021 OSH  AST/ALT 19/23  A1C 7.3%  TSH 1.02   HDL 50 Trig 114 LDL 58  The ASCVD Risk score (P[place] DK, et al., 2019) failed to calculate for the  following reasons:    The 2019 ASCVD risk score is only valid for ages 40 to 79    The patient has a prior MI or stroke diagnosis    Sodium   Date Value Ref Range Status   10/24/2022 140 135 - 145 mmol/L Final     Potassium   Date Value Ref Range Status   10/24/2022 4.5 3.4 - 5.1 mmol/L Final     Calcium (mg/dL)   Date Value   10/24/2022 8.5     Chloride (mmol/L)   Date Value   10/24/2022 106     Carbon Dioxide (mmol/L)   Date Value   10/24/2022 25     Glucose   Date Value Ref Range Status   10/24/2022 169 (H) 70 - 99 mg/dL Final     BUN   Date Value Ref Range Status   10/24/2022 34 (H) 6 - 20 mg/dL Final     Creatinine   Date Value Ref Range Status   10/24/2022 1.20 (H) 0.67 - 1.17 mg/dL Final     Glomerular Filtration Rate (no units)   Date Value   10/24/2022 60      CrCl cannot be calculated (Patient's most recent lab result is older than the maximum 7 days allowed.).  No results found for: MG  AST/SGOT   Date Value Ref Range Status   06/23/2020 31 <38 Units/L Final     ALT/SGPT   Date Value Ref Range Status   06/23/2020 43 <64 Units/L Final       HGB   Date Value Ref Range Status   10/15/2022 10.3 (L) 13.0 - 17.0 g/dL Final     HCT   Date Value Ref Range Status   10/15/2022 31.8 (L) 39.0 - 51.0 % Final     PLT   Date Value Ref Range Status   10/15/2022 62 (L) 140 - 450 K/mcL Final     Comment:     Platelet count verified by smear review       TSH   Date Value Ref Range Status   06/23/2020 1.398 0.350 - 5.000 mcUnits/mL Final       NT-proBNP (pg/mL)   Date Value   10/24/2022 1,717 (H)     No results found for: BNP    CHOLESTEROL   Date Value   06/23/2020 146 mg/dL   06/23/2020 Desirable            <200   06/23/2020 Borderline High      200 to 239   06/23/2020 High                 >=240     HDL   Date Value   06/23/2020 58 mg/dL   06/23/2020 Low            <40   06/23/2020 Borderline Low 40 to 49   06/23/2020 Near Optimal   50 to 59   06/23/2020 Optimal        >=60     CHOL/HDL (no units)   Date Value   06/23/2020  2.5     TRIGLYCERIDE   Date Value   06/23/2020 87 mg/dL   06/23/2020 Normal                   <150   06/23/2020 Borderline High          150 to 199   06/23/2020 High                     200 to 499   06/23/2020 Very High                >=500     CALCULATED LDL   Date Value   06/23/2020 BORDERLINE HIGH       130-159   06/23/2020 HIGH                  160-189   06/23/2020 VERY HIGH             >=190   06/23/2020 71 mg/dL   06/23/2020 OPTIMAL               <100   06/23/2020 NEAR OPTIMAL          100-129       Hemoglobin A1C   Date Value   06/23/2020 7.5 % (H)   06/23/2020 A1C%           eAG mg/dL   06/23/2020 6.0            126   06/23/2020 6.5            140   06/23/2020 7.0            154   06/23/2020 7.5            169   06/23/2020 8.0            183   06/23/2020 8.5            197   06/23/2020 9.0            212   06/23/2020 9.5            226   06/23/2020 10.0           240   06/23/2020 ----DIABETIC SCREENING---   06/23/2020 NON DIABETIC                 <5.7%   06/23/2020 INCREASED RISK                5.7-6.4%   06/23/2020 DIAGNOSTIC FOR DIABETES      >6.4%   06/23/2020 ----DIABETIC CONTROL---     No results found for: MALBCR    Assessment    Problem List Items Addressed This Visit        Cardiac and Vasculature    Peripheral arterial disease (CMS/[facility])    Relevant Orders    SLEEP STUDY HOME 4 CHANNEL PORTABLE UNATTENDED    SERVICE TO SLEEP MEDICINE    Hypertension    Relevant Orders    SLEEP STUDY HOME 4 CHANNEL PORTABLE UNATTENDED    SERVICE TO SLEEP MEDICINE    Hyperlipidemia    Hx of CABG    Relevant Orders    SLEEP STUDY HOME 4 CHANNEL PORTABLE UNATTENDED    SERVICE TO SLEEP MEDICINE    H/O carotid endarterectomy    CHF (congestive heart failure) (CMS/[facility]) - Primary    Relevant Orders    SLEEP STUDY HOME 4 CHANNEL PORTABLE UNATTENDED    SERVICE TO SLEEP MEDICINE    Cardiomyopathy (CMS/[facility])    Relevant Orders    SLEEP STUDY HOME 4 CHANNEL PORTABLE UNATTENDED    SERVICE TO SLEEP MEDICINE       Coag and  Thromboembolic    Thrombocytopenia (CMS/HCC)       Endocrine and Metabolic    Diabetes (CMS/HCC)    Class 1 obesity due to excess calories with serious comorbidity and body mass index (BMI) of 31.0 to 31.9 in adult    Relevant Orders    SLEEP STUDY HOME 4 CHANNEL PORTABLE UNATTENDED    SERVICE TO SLEEP MEDICINE       Neuro    H/O: CVA (cerebrovascular accident)       Sleep    Risk factors for obstructive sleep apnea    Relevant Orders    SLEEP STUDY HOME 4 CHANNEL PORTABLE UNATTENDED    SERVICE TO SLEEP MEDICINE          Orders  Orders Placed This Encounter   • SERVICE TO SLEEP MEDICINE     Referral Priority:   Routine     Referral Type:   Consult & Treatment     Number of Visits Requested:   1     Expiration Date:   11/7/2023   • SLEEP STUDY HOME 4 CHANNEL PORTABLE UNATTENDED     eval for RADHA, STOP BANG 4, CHF, SOB, fatigue       Discussion/Summary    CHF     HFpEF  Stage C, NYHA FC II   euvolemic wt down, improved SOB, LE edema  BNP 2795-9020, more adherent to daily Lasix  Etiology ICM HTN, RADHA?  Last TTE 10/2022 EF 50%, peak pressure 42 mmHg  Small left pleural effusion on CXR 10/2022  Echo on 6/23/20 showed EF 55 to 60% with grade 1 DD and no WMA  Lexiscan 1/2018 and 6/2020 as noted above  No current c/o CP    Current GDMT:  Enalapril 2.5 mg daily  Metoprolol XL 50 mg daily  Furosemide 20 mg daily  Potassium 10 meq daily    We discussed GDMT and options for titration, consider switch ACEI to Entresto, add on MRA and can stop potassium, or add on SGLT2 and adjust diuretic.  Daughter concerned not to affect kidney fxn and wants to review pending BMP from today.  Daughter wants to keep meds the same for now.        No acute distress, Compensated, Continue current medication, Proper usage and SE of meds reviewed and discussed and Medical compliance with plan discussed and risks of non compliance reviewed  Please bring medications to your next appointment  Do not run out of your medications  Avoid  NSAIDs    Labs/Diagnostics Due: reviewed cardiac diagnostics and labs with pt and significant other today  Fu heme for low platelets and further lab work up  BMP results from today pending to monitor renal fxn  Daughter agreeable to in home sleep study to eval RADHA    Hypertension  Goal BP <130/80  -BP today is at goal  -Current HTN regimen includes, HF medications listed above    CAD s/p CABG x3 2010  Anticoagulation was discussed with the patient regarding aspirin. Risks of serious, spontaneous life-threatening bleeding or death were discussed. Patient was advised to watch for excessive bruising, bleeding gums, hematuria, blood in the stool, hematemesis, epistaxis or hemoptysis. Patient understood. Patient is aware  that he should not discontinue either of these medications unless advised to do so by his cardiologist. Patient was reminded about the risk of acute stent thrombosis, heart attack or death if aspirin discontinued.   On BB and statin and ISDN 20 mg BID.    Pt was also suppose to have LHC 10/19/22 but was CAN due to fluid overload.  Daughter not sure if pt still needs to have done.  Will discuss with Dr. REGAN Belle.    HLD/CVA  On statin.  Follow low cholesterol/low fat diet.  Exercise/weight loss as recommended.  Monitor lipids.      Bilateral lower extremity claudication/PVD/carotid stenosis s/p endarterectomy right 2/2013  CTA as noted in chart 2/2022 2020 carotid duplex with no evidence of significant stenosis  Vascular following, seen by Dr. Parks and Dr. Batres 3/2022  Was prescribed Cilostazol, however, patient not taking  On ASA and statin    DM-  Controlled A1C 7.3%, on ACEI and statin, fu PCP.  On insulin.  Consider SGLT2.     Obesity-BMI 31.46  Dietary and lifestyle changes as well as long-term weight loss and increased activity were discussed with the patient.      Dietary  Low sodium; no salt added, Low sodium; 2 gram sodium , Read food labels for sodium content and Avoid; added salt,  processed foods, canned goods, take out, fast food, chips, frozen dinners    Fluids  Less than 2.0 L/64 ounces a day  Stay hydrated, okay to drink more liquids up to 64 oz per day    Weights  Does patient have scale at home:  Yes  If weight gain of 5 lbs and 7 days call office    Exercise  Reinforce daily exercise, goal is 30 minutes a day, at least 5 days a week  Walk every day, keep active  Change position slowly to prevent falls and injury, monitor for bleeding and bruising  Advise use of assistive devices as directed for fall prevention    Follow Up Plan  Recommended follow up  Cardiology-Feb Dr. REGAN Belle, Sleep Med,Vascular and PCP as rec  Call if worsening symptoms, Leg elevation, Leg/feet exercises, Wear compression stockings, Patient education completed : Disease process, etiology, prognosis and RTC after pending lab results or prn  Educated on how to contact HF Clinic  Patient (or family/caregiver) verbalized understanding and agreement with plan.  Patient was advised to call if they experience any new or worsening symptoms.  Wash hands, avoid sick contacts.  Contact local [facility] hospital regarding updates on vaccine information.  Can activate [employer] rain or Patient Portal for vaccine updates.    Pt received COVID vaccines.     Time  Consult Counseling:   Total time spent chart prep, with the patient, counseling, coordinating care, documenting was 65 minutes.    The 21st Century Cures Act makes medical notes like these available to patients in the interest of transparency.  Please be advised that this is a medical document. Medical documents are intended to carry relevant information, facts as evident, and the clinical opinion of the practitioner.  The medical note is intended as a peer to peer communication, and may appear blunt or direct. It is written in medical language, and may contain abbreviations and terminology that is unfamiliar.          Misha EDWARDS/CNP  Heart  Failure/Cardiology  [facility]       surgery B UE LE grossly 4/5/grossly assessed due to

## 2023-06-01 NOTE — BRIEF OPERATIVE NOTE - NSICDXBRIEFPROCEDURE_GEN_ALL_CORE_FT
PROCEDURES:  Laminectomy, decompressive, spine, lumbar, 1 level 01-Jun-2023 10:36:11 L4-5 Hubert Reyes

## 2023-06-12 ENCOUNTER — APPOINTMENT (OUTPATIENT)
Dept: ORTHOPEDIC SURGERY | Facility: CLINIC | Age: 83
End: 2023-06-12
Payer: MEDICARE

## 2023-06-12 VITALS
BODY MASS INDEX: 26.82 KG/M2 | WEIGHT: 161 LBS | SYSTOLIC BLOOD PRESSURE: 115 MMHG | HEIGHT: 65 IN | DIASTOLIC BLOOD PRESSURE: 78 MMHG | HEART RATE: 74 BPM

## 2023-06-12 DIAGNOSIS — M41.9 SCOLIOSIS, UNSPECIFIED: ICD-10-CM

## 2023-06-12 PROCEDURE — 99024 POSTOP FOLLOW-UP VISIT: CPT

## 2023-06-12 PROCEDURE — 72100 X-RAY EXAM L-S SPINE 2/3 VWS: CPT

## 2023-06-12 NOTE — HISTORY OF PRESENT ILLNESS
[___ Days Post Op] : post op day #[unfilled] [6] : the patient reports pain that is 6/10 in severity [Clean/Dry/Intact] : clean, dry and intact [Vascular Intact] : ~T peripheral vascular exam normal [Negative Nicole's] : maneuvers demonstrated a negative Nicole's sign [Doing Well] : is doing well [Excellent Pain Control] : has excellent pain control [No Sign of Infection] : is showing no signs of infection [Steri-Strips Removed & Replaced] : steri-strips removed and replaced [Limited ADLs] : to participate in activities of daily living with limitations [No Work] : not to work [No Housework] : not to do housework [No Sports] : not to participate in sports [Chills] : no chills [Constipation] : no constipation [Diarrhea] : no diarrhea [Dysuria] : no dysuria [Fever] : no fever [Nausea] : no nausea [Vomiting] : no vomiting [Erythema] : not erythematous [Discharge] : absent of discharge [Swelling] : not swollen [Dehiscence] : not dehisced [de-identified] : Lumbar laminectomy 6/1/2023 L4-5 [de-identified] : Patient is here today for her first post operative office visit. Overall coming along was in a lot of pain first couple of days now walking less pain wearing back brace. Worse walking and standing and better resting sitting. Patient's daughter states mother fell 2 days ago but states she feels fine. [de-identified] : cane\par seen with her daughter\par Ambulates with a non-ataxic nonantalgic gait.  5 out of 5 motor strength of hip flexion knee extension flexion ankle dorsi flexion EHL bilaterally.  Grossly intact light touch sensation bilateral lower extremities. [de-identified] : AP and lateral image of the lumbar spine demonstrates minimal right-sided thoracolumbar curve.  On the lateral projection 65 degrees of segmental kyphosis from T12-L3 with degeneration most prominent at the apex from T12-L2.  No obvious acute fractures.  Laminectomy seen with resection of L4 spinous process [de-identified] : continue tylenol, tizanidine at night may be tried for back spasms.  She has significant improvement of her leg pain with improved walking but does report some back pain rating to her buttocks.  This is likely related to the thoracolumbar kyphotic deformity\par start PT on 7/1/23 and a prescription was provided\par f/u in 1 month\par May seen PMNR if still having LBP\par \par The patient was educated regarding their condition, treatment options as well as prescribed course of treatment. \par Risks and benefits as well as alternatives to the proposed treatment were also provided to the patient \par They were given the opportunity to have all their questions answered to their satisfaction.\par \par Vital signs were reviewed with the patient and the patient was instructed to followup with their primary care provider for further management. There were no PAs or scribes used in the evaluation, exam or treatment plan discussion. The surgeon was the primary evaluating or treating physician as noted above.

## 2023-08-09 ENCOUNTER — APPOINTMENT (OUTPATIENT)
Dept: CT IMAGING | Facility: HOSPITAL | Age: 83
End: 2023-08-09
Payer: MEDICARE

## 2023-08-09 ENCOUNTER — OUTPATIENT (OUTPATIENT)
Dept: OUTPATIENT SERVICES | Facility: HOSPITAL | Age: 83
LOS: 1 days | End: 2023-08-09
Payer: MEDICARE

## 2023-08-09 ENCOUNTER — APPOINTMENT (OUTPATIENT)
Dept: ORTHOPEDIC SURGERY | Facility: CLINIC | Age: 83
End: 2023-08-09

## 2023-08-09 DIAGNOSIS — Z90.49 ACQUIRED ABSENCE OF OTHER SPECIFIED PARTS OF DIGESTIVE TRACT: Chronic | ICD-10-CM

## 2023-08-09 DIAGNOSIS — Z98.890 OTHER SPECIFIED POSTPROCEDURAL STATES: Chronic | ICD-10-CM

## 2023-08-09 DIAGNOSIS — C34.91 MALIGNANT NEOPLASM OF UNSPECIFIED PART OF RIGHT BRONCHUS OR LUNG: ICD-10-CM

## 2023-08-09 PROCEDURE — 71250 CT THORAX DX C-: CPT | Mod: 26,MH

## 2023-08-09 PROCEDURE — 71250 CT THORAX DX C-: CPT

## 2023-08-15 ENCOUNTER — APPOINTMENT (OUTPATIENT)
Dept: THORACIC SURGERY | Facility: CLINIC | Age: 83
End: 2023-08-15
Payer: MEDICARE

## 2023-08-15 VITALS
DIASTOLIC BLOOD PRESSURE: 60 MMHG | SYSTOLIC BLOOD PRESSURE: 108 MMHG | RESPIRATION RATE: 17 BRPM | HEIGHT: 64 IN | WEIGHT: 160 LBS | HEART RATE: 67 BPM | OXYGEN SATURATION: 95 % | BODY MASS INDEX: 27.31 KG/M2

## 2023-08-15 DIAGNOSIS — R91.8 OTHER NONSPECIFIC ABNORMAL FINDING OF LUNG FIELD: ICD-10-CM

## 2023-08-15 PROCEDURE — 99213 OFFICE O/P EST LOW 20 MIN: CPT

## 2023-08-15 RX ORDER — ASPIRIN 81 MG
81 TABLET, DELAYED RELEASE (ENTERIC COATED) ORAL
Refills: 0 | Status: COMPLETED | COMMUNITY
End: 2023-08-15

## 2023-08-15 NOTE — DATA REVIEWED
[FreeTextEntry1] : Independently reviewed the following: CT Chest on 8/9/2023: [employer] ID [id number]

## 2023-08-15 NOTE — HISTORY OF PRESENT ILLNESS
[FreeTextEntry1] : 83 year old Mandarin speaking female, s/p FB, uniportal right VATS, right upper lobectomy with en bloc wedge resection of RML, and MLND on 1/10/18. Pathology revealed RUL T2aN0 adenocarcinoma, tumor involves RML, 1.2 x 0.8 x 0.5 cm, positive visceral pleura and lymph vascular invasion. She has completed chemotherapy with Dr. Davis (Oncology).   She then underwent CT guided core biopsy of RML nodule on 3/21/19 with IR. Pathology was negative for malignant cells.   CXR on 1/21/19 revealed: - There is right lung volume loss with postsurgical change including chain sutures and surgical clips.  - There is a 4 mm peripheral left midlung nodule.  - The lungs are otherwise clear.   CT chest scan 7/8/2020: -4mm sized peripheral calcified nodule in the MANDEEP -No solid or gg nodules.  -no evidence of recurrent or metastatic nodules.   CXR on 01/28/2021: - post-op changes - stable 4 mm peripheral left midlung  CT Chest on 7/23/21: - RUL postop changes.  - Unchanged, MANDEEP subpleural calcified nodules   CXR on 02/07/2022: - The lungs demonstrate stable 5 mm nodular density in the left lower lobe. Linear subsegmental atelectatic changes are seen at the left base.  CT Chest on 8/9/2023: Carestream ID [id number]  - post-op changes - 2 mm left upper lobe pulmonary nodule on image 96 of series 3 is also unchanged. - 2 mm right lower lobe pulmonary nodule on image 117 of series 3 is unchanged since July 8, 2020 chest CT. - 4 mm right middle lobe pulmonary nodule adjacent to the fissure on image 45 of series 3 is unchanged since July 8, 2020. - Vertically oriented and ill-defined groundglass opacity measuring about 5 mm in its longest dimension as measured on the axial images on image 46 of series 3 adjacent to the fissure is without significant interval change since August 8, 2022 although it demonstrates interval increase in size since July 8, 2020 when a 2 mm nodular opacity is noted.  Patient is here today for a follow up. Admits to SOB on exertion. Denies CP or cough.

## 2023-08-15 NOTE — CONSULT LETTER
[Dear  ___] : Dear  [unfilled], [Courtesy Letter:] : I had the pleasure of seeing your patient, [unfilled], in my office today. [Please see my note below.] : Please see my note below. [Sincerely,] : Sincerely, [FreeTextEntry2] : Ilya Alaniz MD (Med Onc)\par  Brigitte Mitchell DO (Pulm)\par  Nick Caba (PCP) \par   [FreeTextEntry3] : Javad Corbin MD, FACS \par  Chief, Division of Thoracic Surgery \par  Director, Minimally Invasive Thoracic Surgery \par  Department of Cardiovascular and Thoracic Surgery \par  [facility] \par  , Cardiovascular and Thoracic Surgery\par  \par  \par

## 2023-08-15 NOTE — ASSESSMENT
[FreeTextEntry1] : 83 year old Mandarin speaking female, s/p FB, uniportal right VATS, right upper lobectomy with en bloc wedge resection of RML, and MLND on 1/10/18. Pathology revealed RUL T2aN0 adenocarcinoma, tumor involves RML, 1.2 x 0.8 x 0.5 cm, positive visceral pleura and lymph vascular invasion. She has completed chemotherapy with Dr. Davis (Oncology).   CT Chest on 8/9/2023: Carestream ID [id number]  - post-op changes - 2 mm left upper lobe pulmonary nodule on image 96 of series 3 is also unchanged. - 2 mm right lower lobe pulmonary nodule on image 117 of series 3 is unchanged since July 8, 2020 chest CT. - 4 mm right middle lobe pulmonary nodule adjacent to the fissure on image 45 of series 3 is unchanged since July 8, 2020. - Vertically oriented and ill-defined groundglass opacity measuring about 5 mm in its longest dimension as measured on the axial images on image 46 of series 3 adjacent to the fissure is without significant interval change since August 8, 2022 although it demonstrates interval increase in size since July 8, 2020 when a 2 mm nodular opacity is noted.  I have reviewed the patient's medical records and diagnostic images at time of this office consultation and have made the following recommendation: 1. CT scan showed no evidence of recurrence, recommended patient to return to office in 6 mo w/ CT Chest w/o contrast.   I, YAQUELIN SerranoIM, personally performed the evaluation and management (E/M) services for this established patient who presents today with (a) new problem(s)/exacerbation of (an) existing condition(s). That E/M includes conducting the examination, assessing all new/exacerbated conditions, and establishing a new plan of care. Today, my ACP, Addis Lawson NP was here to observe my evaluation and management services for this new problem/exacerbated condition to be followed going forward.

## 2023-10-13 NOTE — PATIENT PROFILE ADULT. - SOCIAL CONCERNS
Quality 226: Preventive Care And Screening: Tobacco Use: Screening And Cessation Intervention: Patient screened for tobacco use and is an ex/non-smoker Quality 110: Preventive Care And Screening: Influenza Immunization: Influenza Immunization not Administered because Patient Refused. Detail Level: Detailed Quality 47: Advance Care Plan: Advance care planning not documented, reason not otherwise specified. Quality 111:Pneumonia Vaccination Status For Older Adults: Pneumococcal Vaccination not Administered or Previously Received, Reason not Otherwise Specified None

## 2023-12-06 DIAGNOSIS — M54.9 DORSALGIA, UNSPECIFIED: ICD-10-CM

## 2023-12-06 DIAGNOSIS — G89.29 DORSALGIA, UNSPECIFIED: ICD-10-CM

## 2023-12-06 DIAGNOSIS — Z98.890 OTHER SPECIFIED POSTPROCEDURAL STATES: ICD-10-CM

## 2023-12-07 ENCOUNTER — APPOINTMENT (OUTPATIENT)
Dept: GERIATRICS | Facility: CLINIC | Age: 83
End: 2023-12-07
Payer: MEDICARE

## 2023-12-07 VITALS
DIASTOLIC BLOOD PRESSURE: 70 MMHG | OXYGEN SATURATION: 98 % | HEART RATE: 72 BPM | SYSTOLIC BLOOD PRESSURE: 92 MMHG | WEIGHT: 155 LBS | BODY MASS INDEX: 26.46 KG/M2 | TEMPERATURE: 97 F | HEIGHT: 64 IN | RESPIRATION RATE: 14 BRPM

## 2023-12-07 DIAGNOSIS — R41.3 OTHER AMNESIA: ICD-10-CM

## 2023-12-07 DIAGNOSIS — Z23 ENCOUNTER FOR IMMUNIZATION: ICD-10-CM

## 2023-12-07 DIAGNOSIS — K59.09 OTHER CONSTIPATION: ICD-10-CM

## 2023-12-07 DIAGNOSIS — E53.8 DEFICIENCY OF OTHER SPECIFIED B GROUP VITAMINS: ICD-10-CM

## 2023-12-07 PROCEDURE — 99204 OFFICE O/P NEW MOD 45 MIN: CPT

## 2023-12-07 RX ORDER — ZOLPIDEM TARTRATE 10 MG/1
10 TABLET ORAL
Refills: 0 | Status: COMPLETED | COMMUNITY
End: 2023-09-01

## 2023-12-07 RX ORDER — GABAPENTIN 100 MG/1
100 CAPSULE ORAL
Qty: 30 | Refills: 2 | Status: COMPLETED | COMMUNITY
Start: 2023-04-24 | End: 2023-12-07

## 2023-12-07 RX ORDER — METFORMIN HYDROCHLORIDE 500 MG/1
500 TABLET, COATED ORAL DAILY
Refills: 0 | Status: ACTIVE | COMMUNITY
Start: 2023-12-07

## 2023-12-07 RX ORDER — GLYBURIDE 2.5 MG/1
2.5 TABLET ORAL DAILY
Refills: 0 | Status: COMPLETED | COMMUNITY
End: 2023-12-07

## 2023-12-07 RX ORDER — METOPROLOL TARTRATE 25 MG/1
25 TABLET, FILM COATED ORAL TWICE DAILY
Refills: 0 | Status: ACTIVE | COMMUNITY

## 2023-12-07 RX ORDER — TIZANIDINE 2 MG/1
2 TABLET ORAL
Qty: 30 | Refills: 0 | Status: COMPLETED | COMMUNITY
Start: 2023-05-25 | End: 2023-09-01

## 2023-12-07 RX ORDER — HYDROCHLOROTHIAZIDE 12.5 MG/1
CAPSULE ORAL
Refills: 0 | Status: COMPLETED | COMMUNITY
End: 2023-09-01

## 2023-12-07 RX ORDER — ATORVASTATIN CALCIUM 40 MG/1
40 TABLET, FILM COATED ORAL
Refills: 0 | Status: ACTIVE | COMMUNITY
Start: 2023-12-07

## 2023-12-07 RX ORDER — POLYETHYLENE GLYCOL 3350 17 G/17G
17 POWDER, FOR SOLUTION ORAL DAILY
Qty: 1 | Refills: 2 | Status: ACTIVE | COMMUNITY
Start: 2023-12-07 | End: 1900-01-01

## 2023-12-07 RX ORDER — METFORMIN HYDROCHLORIDE 850 MG/1
850 TABLET, FILM COATED ORAL DAILY
Refills: 0 | Status: COMPLETED | COMMUNITY
End: 2023-12-07

## 2023-12-07 RX ORDER — HYDROMORPHONE HYDROCHLORIDE 2 MG/1
2 TABLET ORAL
Qty: 30 | Refills: 0 | Status: COMPLETED | COMMUNITY
Start: 2023-05-25 | End: 2023-07-01

## 2023-12-07 RX ORDER — ATORVASTATIN CALCIUM 10 MG/1
10 TABLET, FILM COATED ORAL
Refills: 0 | Status: COMPLETED | COMMUNITY
End: 2023-12-07

## 2023-12-14 ENCOUNTER — APPOINTMENT (OUTPATIENT)
Dept: GERIATRICS | Facility: CLINIC | Age: 83
End: 2023-12-14
Payer: MEDICARE

## 2023-12-14 VITALS
DIASTOLIC BLOOD PRESSURE: 70 MMHG | HEIGHT: 64 IN | HEART RATE: 79 BPM | OXYGEN SATURATION: 96 % | WEIGHT: 155 LBS | SYSTOLIC BLOOD PRESSURE: 118 MMHG | TEMPERATURE: 97.3 F | RESPIRATION RATE: 14 BRPM | BODY MASS INDEX: 26.46 KG/M2

## 2023-12-14 DIAGNOSIS — F03.B3 UNSPECIFIED DEMENTIA, MODERATE, WITH MOOD DISTURBANCE: ICD-10-CM

## 2023-12-14 DIAGNOSIS — Z71.89 OTHER SPECIFIED COUNSELING: ICD-10-CM

## 2023-12-14 PROCEDURE — 99483 ASSMT & CARE PLN PT COG IMP: CPT

## 2023-12-14 RX ORDER — DILTIAZEM HYDROCHLORIDE 180 MG/1
180 CAPSULE, EXTENDED RELEASE ORAL
Refills: 0 | Status: COMPLETED | COMMUNITY
End: 2023-12-07

## 2024-01-25 ENCOUNTER — APPOINTMENT (OUTPATIENT)
Dept: GERIATRICS | Facility: CLINIC | Age: 84
End: 2024-01-25

## 2024-02-06 ENCOUNTER — APPOINTMENT (OUTPATIENT)
Dept: CT IMAGING | Facility: HOSPITAL | Age: 84
End: 2024-02-06
Payer: MEDICARE

## 2024-02-06 ENCOUNTER — APPOINTMENT (OUTPATIENT)
Dept: MRI IMAGING | Facility: HOSPITAL | Age: 84
End: 2024-02-06
Payer: MEDICARE

## 2024-02-06 ENCOUNTER — OUTPATIENT (OUTPATIENT)
Dept: OUTPATIENT SERVICES | Facility: HOSPITAL | Age: 84
LOS: 1 days | End: 2024-02-06
Payer: MEDICARE

## 2024-02-06 DIAGNOSIS — Z98.890 OTHER SPECIFIED POSTPROCEDURAL STATES: Chronic | ICD-10-CM

## 2024-02-06 DIAGNOSIS — Z90.49 ACQUIRED ABSENCE OF OTHER SPECIFIED PARTS OF DIGESTIVE TRACT: Chronic | ICD-10-CM

## 2024-02-06 DIAGNOSIS — R41.3 OTHER AMNESIA: ICD-10-CM

## 2024-02-06 PROCEDURE — 70551 MRI BRAIN STEM W/O DYE: CPT | Mod: 26,MH

## 2024-02-06 PROCEDURE — 71250 CT THORAX DX C-: CPT | Mod: 26,MH

## 2024-02-06 PROCEDURE — 70551 MRI BRAIN STEM W/O DYE: CPT

## 2024-02-06 PROCEDURE — 71250 CT THORAX DX C-: CPT

## 2024-02-20 ENCOUNTER — APPOINTMENT (OUTPATIENT)
Dept: GERIATRICS | Facility: CLINIC | Age: 84
End: 2024-02-20
Payer: MEDICARE

## 2024-02-20 ENCOUNTER — APPOINTMENT (OUTPATIENT)
Dept: THORACIC SURGERY | Facility: CLINIC | Age: 84
End: 2024-02-20
Payer: MEDICARE

## 2024-02-20 VITALS
DIASTOLIC BLOOD PRESSURE: 80 MMHG | SYSTOLIC BLOOD PRESSURE: 128 MMHG | WEIGHT: 162 LBS | BODY MASS INDEX: 26.03 KG/M2 | HEIGHT: 66 IN | RESPIRATION RATE: 17 BRPM | HEART RATE: 69 BPM | OXYGEN SATURATION: 97 %

## 2024-02-20 VITALS
TEMPERATURE: 97 F | RESPIRATION RATE: 16 BRPM | WEIGHT: 162 LBS | DIASTOLIC BLOOD PRESSURE: 82 MMHG | OXYGEN SATURATION: 96 % | HEIGHT: 66 IN | HEART RATE: 71 BPM | SYSTOLIC BLOOD PRESSURE: 120 MMHG | BODY MASS INDEX: 26.03 KG/M2

## 2024-02-20 DIAGNOSIS — K59.00 CONSTIPATION, UNSPECIFIED: ICD-10-CM

## 2024-02-20 PROCEDURE — 99214 OFFICE O/P EST MOD 30 MIN: CPT

## 2024-02-20 PROCEDURE — 99213 OFFICE O/P EST LOW 20 MIN: CPT

## 2024-02-20 NOTE — HISTORY OF PRESENT ILLNESS
[FreeTextEntry1] : 84 year old Mandarin speaking female, s/p FB, uniportal right VATS, right upper lobectomy with en bloc wedge resection of RML, and MLND on 1/10/18. Pathology revealed RUL T2aN0 adenocarcinoma, tumor involves RML, 1.2 x 0.8 x 0.5 cm, positive visceral pleura and lymph vascular invasion. She has completed chemotherapy with Dr. Davis (Oncology).   She then underwent CT guided core biopsy of RML nodule on 3/21/19 with IR. Pathology was negative for malignant cells.   CXR on 1/21/19 revealed: - There is right lung volume loss with postsurgical change including chain sutures and surgical clips.  - There is a 4 mm peripheral left midlung nodule.  - The lungs are otherwise clear.   CT chest scan 7/8/2020: -4mm sized peripheral calcified nodule in the MANDEEP -No solid or gg nodules.  -no evidence of recurrent or metastatic nodules.   CXR on 01/28/2021: - post-op changes - stable 4 mm peripheral left midlung  CT Chest on 7/23/21: - RUL postop changes.  - Unchanged, MANDEEP subpleural calcified nodules   CXR on 02/07/2022: - The lungs demonstrate stable 5 mm nodular density in the left lower lobe. Linear subsegmental atelectatic changes are seen at the left base.  CT Chest on 8/9/2023: Carestream ID [id number]  - post-op changes - 2 mm left upper lobe pulmonary nodule on image 96 of series 3 is also unchanged. - 2 mm right lower lobe pulmonary nodule on image 117 of series 3 is unchanged since July 8, 2020 chest CT. - 4 mm right middle lobe pulmonary nodule adjacent to the fissure on image 45 of series 3 is unchanged since July 8, 2020. - Vertically oriented and ill-defined groundglass opacity measuring about 5 mm in its longest dimension as measured on the axial images on image 46 of series 3 adjacent to the fissure is without significant interval change since August 8, 2022 although it demonstrates interval increase in size since July 8, 2020 when a 2 mm nodular opacity is noted.  CT Chest on 2/6/24:  - s/p RULobectomy. A 0.5 cm groundglass nodule in the RLL as well as 0.4 cm solid nodules in the RML and LLL are noted. They are unchanged when compared to previous exam. - Calcified nodule is noted in the MANDEEP.  - Below the diaphragm, visualized portions of the abdomen demonstrate small calcification in the gallbladder. Thickening of both adrenal glands is noted.  Patient is here today for a follow up. Patient fell 3 months ago and had a fracture to her Rt foot.

## 2024-02-20 NOTE — CONSULT LETTER
[Dear  ___] : Dear  [unfilled], [Courtesy Letter:] : I had the pleasure of seeing your patient, [unfilled], in my office today. [Please see my note below.] : Please see my note below. [Sincerely,] : Sincerely, [FreeTextEntry2] : Ilya Alaniz MD (Med Onc)\par  Brigitte Mitchell DO (Pulm)\par  Nick Caba (PCP) \par   [FreeTextEntry3] : Javad Corbin MD, FACS \par  Chief, Division of Thoracic Surgery \par  Director, Minimally Invasive Thoracic Surgery \par  Department of Cardiovascular and Thoracic Surgery \par  [facility] \par  , Cardiovascular and Thoracic Surgery\par  \par  \par

## 2024-02-21 PROBLEM — K59.00 CONSTIPATION: Status: ACTIVE | Noted: 2023-12-07

## 2024-02-21 NOTE — HISTORY OF PRESENT ILLNESS
[FreeTextEntry1] : 82 yo female w/ a hx of a fib, DMII, hypothyroidism, depression and dementia presents to the office for a follow up visit. Patient had a brain MRI performed. It did not show any chronic/acute infarcts. There was mild diffuse parenchymal volume loss reported. No acute complaints reported today. Since the last visit patients granddaughter has moved into the house which has been good for patients mood and motivation.  Depression Pt was recently started on Zoloft 25mg daily.  Pt states her mood is much better. She is no longer having anger/sad outbursts.   Dementia  Stable. Not on any medications MOCA score on 12/14/23: 16.   a fib Taking Metoprolol 25mg BID. Rate controlled. Not on oral AC  Constipation Stable with miralax  DM Taking metformin 500mg and glyburide 3mg daily.

## 2024-02-21 NOTE — REVIEW OF SYSTEMS
[Fever] : no fever [Chills] : no chills [Feeling Tired] : not feeling tired [Heart Rate Is Slow] : the heart rate was not slow [Heart Rate Is Fast] : the heart rate was not fast [Chest Pain] : no chest pain [Palpitations] : no palpitations [Shortness Of Breath] : no shortness of breath [Wheezing] : no wheezing [Cough] : no cough [SOB on Exertion] : no shortness of breath during exertion [Abdominal Pain] : no abdominal pain [Vomiting] : no vomiting [Constipation] : constipation [Diarrhea] : no diarrhea [Dysuria] : no dysuria [Incontinence] : no incontinence [Skin Lesions] : no skin lesions [Confused] : no confusion [Dizziness] : no dizziness [Fainting] : no fainting [Sleep Disturbances] : sleep disturbances [Anxiety] : no anxiety [Depression] : depression

## 2024-02-21 NOTE — PHYSICAL EXAM
[Alert] : alert [No Acute Distress] : in no acute distress [EOMI] : extraocular movements were intact [Normal Appearance] : the appearance of the neck was normal [Supple] : the neck was supple [No Respiratory Distress] : no respiratory distress [No Acc Muscle Use] : no accessory muscle use [Respiration, Rhythm And Depth] : normal respiratory rhythm and effort [Auscultation Breath Sounds / Voice Sounds] : lungs were clear to auscultation bilaterally [Normal S1, S2] : normal S1 and S2 [Heart Rate And Rhythm] : heart rate was normal and rhythm regular [Bowel Sounds] : normal bowel sounds [Abdomen Tenderness] : non-tender [Abdomen Soft] : soft [Involuntary Movements] : no involuntary movements were seen [Normal Color / Pigmentation] : normal skin color and pigmentation [No Focal Deficits] : no focal deficits [Normal Affect] : the affect was normal [Normal Mood] : the mood was normal

## 2024-03-14 ENCOUNTER — APPOINTMENT (OUTPATIENT)
Dept: PULMONOLOGY | Facility: CLINIC | Age: 84
End: 2024-03-14

## 2024-03-23 ENCOUNTER — EMERGENCY (EMERGENCY)
Facility: HOSPITAL | Age: 84
LOS: 1 days | Discharge: HOME | End: 2024-03-23
Attending: INTERNAL MEDICINE | Admitting: INTERNAL MEDICINE
Payer: MEDICARE

## 2024-03-23 VITALS
DIASTOLIC BLOOD PRESSURE: 63 MMHG | OXYGEN SATURATION: 97 % | TEMPERATURE: 98 F | WEIGHT: 149.91 LBS | RESPIRATION RATE: 18 BRPM | HEART RATE: 69 BPM | HEIGHT: 65 IN | SYSTOLIC BLOOD PRESSURE: 141 MMHG

## 2024-03-23 DIAGNOSIS — Z98.890 OTHER SPECIFIED POSTPROCEDURAL STATES: Chronic | ICD-10-CM

## 2024-03-23 DIAGNOSIS — Z90.49 ACQUIRED ABSENCE OF OTHER SPECIFIED PARTS OF DIGESTIVE TRACT: Chronic | ICD-10-CM

## 2024-03-23 PROCEDURE — 99285 EMERGENCY DEPT VISIT HI MDM: CPT

## 2024-03-23 PROCEDURE — 73030 X-RAY EXAM OF SHOULDER: CPT

## 2024-03-23 PROCEDURE — 99284 EMERGENCY DEPT VISIT MOD MDM: CPT | Mod: 25

## 2024-03-23 PROCEDURE — 72125 CT NECK SPINE W/O DYE: CPT | Mod: 26,MC

## 2024-03-23 PROCEDURE — 70450 CT HEAD/BRAIN W/O DYE: CPT | Mod: 26,MC

## 2024-03-23 PROCEDURE — 73030 X-RAY EXAM OF SHOULDER: CPT | Mod: 26,LT,76

## 2024-03-23 PROCEDURE — 70450 CT HEAD/BRAIN W/O DYE: CPT | Mod: MC

## 2024-03-23 PROCEDURE — 72125 CT NECK SPINE W/O DYE: CPT | Mod: MC

## 2024-03-23 RX ORDER — ACETAMINOPHEN 500 MG
975 TABLET ORAL ONCE
Refills: 0 | Status: COMPLETED | OUTPATIENT
Start: 2024-03-23 | End: 2024-03-23

## 2024-03-23 RX ADMIN — Medication 975 MILLIGRAM(S): at 20:04

## 2024-03-23 NOTE — ED PROVIDER NOTE - CARE PLAN
Principal Discharge DX:	Closed head injury  Secondary Diagnosis:	Fracture of humerus, proximal, closed  Secondary Diagnosis:	Accidental fall  Secondary Diagnosis:	Left cervical radiculopathy   1

## 2024-03-23 NOTE — ED PROVIDER NOTE - OBJECTIVE STATEMENT
84-year-old female past history of type 2 diabetes atrial fibrillation not on anticoagulation hypothyroidism dyslipidemia CT of the lung hypertension lumbar radiculopathy came to the emergency room status post fall slipped hit left side of the head also complaining of left shoulder pain and swelling as per the family the left shoulder was was hurting prior to the fall but the injury became worse after the fall patient also complains of numbness in the left upper extremity Patient denies any pain in the neck area upper back or lower back and denies any chest pain or abdominal pain no nausea vomiting

## 2024-03-23 NOTE — ED ADULT TRIAGE NOTE - CHIEF COMPLAINT QUOTE
PT c/o left shoulder pain s/p fall. Bruising noted to left side forehead as well. Denies blood thinners or LOC.

## 2024-03-23 NOTE — ED PROVIDER NOTE - PATIENT PORTAL LINK FT
You can access the FollowMyHealth Patient Portal offered by [facility] by registering at the following website: [email or web address]. By joining [facility]’s FollowMyHealth portal, you will also be able to view your health information using other applications (apps) compatible with our system.

## 2024-03-23 NOTE — ED ADULT NURSE NOTE - NSFALLHARMRISKINTERV_ED_ALL_ED

## 2024-03-23 NOTE — ED PROVIDER NOTE - PHYSICAL EXAMINATION
General:     NAD, well-nourished, well-appearing  Head:     NC/Positive ecchymosis on the left forehead, EOMI, oral mucosa moist  Neck:     trachea midline, No midline C-spine tenderness full range of motion of the C-spine  Lungs:     CTA b/l, no w/r/r  CVS:     S1S2, RRR, no m/g/r  Abd:     +BS, s/nt/nd, no organomegaly  Ext:    2+ radial and pedal pulses, no c/c/e  Left shoulder swelling tenderness mild hypoesthesia in the left upper extremity motor intact  Neuro: AAOx3, no sensory/motor deficits

## 2024-03-23 NOTE — ED PROVIDER NOTE - NSFOLLOWUPINSTRUCTIONS_ED_ALL_ED_FT
Follow up with your PMD within 1-2 days.  Rest, increase your fluids, advance your activity as tolerated.   Take all of your other medications as previously prescribed.   Worsening, continued or ANY new concerning symptoms return to the emergency department.  Tylenol for pain ice rest sling the left arm follow-up with orthopedic outpatient

## 2024-03-23 NOTE — ED PROVIDER NOTE - CLINICAL SUMMARY MEDICAL DECISION MAKING FREE TEXT BOX
84-year-old female past history of type 2 diabetes atrial fibrillation not on anticoagulation hypothyroidism dyslipidemia CT of the lung hypertension lumbar radiculopathy came to the emergency room status post fall slipped hit left side of the head also complaining of left shoulder pain and swelling as per the family the left shoulder was was hurting prior to the fall but the injury became worse after the fall patient also complains of numbness in the left upper extremity Patient denies any pain in the neck area upper back or lower back and denies any chest pain or abdominal pain no nausea vomiting  CT C-spine CT brain ordered report is pending CT will be followed up by Dr. Cam, x-ray of the left shoulder shows proximal humerus fracture right shoulder normal negative fracture
PROVIDER:[TOKEN:[[id number]],ESTABLISHEDPATIENT:[T]]

## 2024-03-23 NOTE — ED PROVIDER NOTE - CARE PROVIDER_API CALL
Scottie Shipley  Orthopaedic Surgery  [address]  [place] [address]  Phone: [phone]  Fax: [phone]  Follow Up Time:

## 2024-03-26 ENCOUNTER — APPOINTMENT (OUTPATIENT)
Dept: ORTHOPEDIC SURGERY | Facility: CLINIC | Age: 84
End: 2024-03-26
Payer: MEDICARE

## 2024-03-26 VITALS
HEART RATE: 83 BPM | WEIGHT: 165 LBS | HEIGHT: 66 IN | DIASTOLIC BLOOD PRESSURE: 69 MMHG | BODY MASS INDEX: 26.52 KG/M2 | SYSTOLIC BLOOD PRESSURE: 100 MMHG

## 2024-03-26 PROCEDURE — 99213 OFFICE O/P EST LOW 20 MIN: CPT

## 2024-03-26 NOTE — DISCUSSION/SUMMARY
[de-identified] : Left proximal humerus fracture with mild displacement.  I discussed with her and her daughter that the alignment of the fracture is good and it can be treated nonoperatively.  I recommend sling immobilization of the left shoulder for approximately 6 weeks.  She should refrain from moving the shoulder for the time being.  She may do distal range of motion of the elbow, wrist, fingers.  She should also be nonweightbearing to the left upper extremity.  She may use over-the-counter Motrin and Tylenol for pain and should ice the shoulder as well.  Discussed that a fracture like this could indicate osteoporosis, so I recommend that she see her primary doctor for evaluation of bone density.  Also discussed that the right shoulder may have had a previous glenoid fracture based on the x-ray.  This would optimally be evaluated with a CT scan.  However, this likely would not  as she is able to move the shoulder and does not have much pain in it.,  Therefore the patient's daughter would like to hold off on CT scan for now.  I will see her back in 4 weeks for repeat evaluation and repeat left shoulder x-rays.  I have personally obtained the history, reviewed the ROS as noted, and performed the physical examination today. The patient and I discussed the assessment and options and developed the plan. All questions were answered and the patient stated their understanding of the treatment plan and appreciation of the visit.  My cumulative time spent on this patient's visit included: Preparation for the visit, review of the medical records, review of pertinent diagnostic studies, examination and counseling of the patient on the above diagnosis, treatment plan and prognosis, orders of diagnostic tests, medications and/or appropriate procedures and documentation in the medical records of today's visit.  This note was generated using dragon medical dictation software.  A reasonable effort has been made for proofreading its contents, but typos may still remain.  If there are any questions or points of clarification needed please notify my office.

## 2024-03-26 NOTE — HISTORY OF PRESENT ILLNESS
[de-identified] : OLGA LIDIA LANE  is a 84 year old right-hand-dominant F who presents with a left shoulder injury.  Translation is provided by her daughter who accompanies her today.  She reports that on Saturday her mother fell and landed on her left shoulder.  She went to the ER where x-rays demonstrated a fracture.  She was placed into a sling and told to follow-up.  Since then she has had pain in the left shoulder particularly with movement.  She has also had swelling and bruising about the shoulder as well.  She has been taking Motrin and Tylenol with some relief of the pain.  She does note previous left-sided neck pain with radiation down the arm prior to the fall which she says is unchanged after the fall.  She also reports a previous fall that injured her right shoulder.  Currently she does not have significant right shoulder pain.

## 2024-03-26 NOTE — PHYSICAL EXAM
[de-identified] : X-rays of the left shoulder obtained on 3/23/2024 demonstrate comminuted proximal humerus fracture, no shoulder dislocation  X-rays of the right shoulder obtained on 3/23/2024 demonstrate possible previous glenoid fracture  Images reviewed in detail with the patient and her daughter [de-identified] : General: No apparent distress Cardiovascular: extremities warm and well-perfused, no cyanosis Pulmonary: non labored respirations  Musculoskeletal:  Cervical Spine: No Tenderness to palpation over the cervical spine in the midline or at the paraspinal musculature Full range of motion without pain Negative Spurling test Negative L'Hermitte test Motor: 5/5 deltoid/biceps/triceps/wrist extensors/wrist flexors/interossei Sensory: SILT in C5/C6/C7/C8/T1 distributions  Left Shoulder: Skin: Swelling, bruising about upper arm Active ROM and passive range of motion: Deferred given known injury Fires EPL/FPL/dorsal interossei/wrist extensors Sensation intact light touch in median/ulnar/radial distributions Radial pulse 2+  Right Shoulder: Skin: intact, no erythema or warmth, no open wounds Active ROM: 120 forward flexion/60 external rotation/internal rotation to the side Motor and sensory intact, fingers warm and well-perfused Tenderness: Negative greater tuberosity, negative biceps tendon, negative shoulder joint line, negative AC joint RC strength: 4+/5 supraspinatus, 5/5 infraspinatus, 5/5 subscapularis, 5/5 teres minor

## 2024-03-26 NOTE — REASON FOR VISIT
[Initial Visit] : an initial visit for [Shoulder Injury] : shoulder injury [Family Member] : family member

## 2024-04-01 NOTE — CHART NOTE - NSCHARTNOTEFT_GEN_A_CORE
84 y o female presenting to the ED on 3/23/24 for fall as per chart.  As per HIE, patient has the recommended orthopedic follow up appointment scheduled on 3/26/24 with Dr. Mello Molina.

## 2024-04-10 ENCOUNTER — APPOINTMENT (OUTPATIENT)
Dept: ORTHOPEDIC SURGERY | Facility: CLINIC | Age: 84
End: 2024-04-10
Payer: MEDICARE

## 2024-04-10 VITALS
SYSTOLIC BLOOD PRESSURE: 103 MMHG | DIASTOLIC BLOOD PRESSURE: 68 MMHG | WEIGHT: 165 LBS | BODY MASS INDEX: 26.52 KG/M2 | HEIGHT: 66 IN | HEART RATE: 69 BPM

## 2024-04-10 DIAGNOSIS — M54.12 RADICULOPATHY, CERVICAL REGION: ICD-10-CM

## 2024-04-10 DIAGNOSIS — M50.30 OTHER CERVICAL DISC DEGENERATION, UNSPECIFIED CERVICAL REGION: ICD-10-CM

## 2024-04-10 DIAGNOSIS — M50.20 OTHER CERVICAL DISC DISPLACEMENT, UNSPECIFIED CERVICAL REGION: ICD-10-CM

## 2024-04-10 PROCEDURE — 99214 OFFICE O/P EST MOD 30 MIN: CPT

## 2024-04-10 RX ORDER — GABAPENTIN 100 MG/1
100 CAPSULE ORAL
Qty: 30 | Refills: 2 | Status: ACTIVE | COMMUNITY
Start: 2024-04-10 | End: 1900-01-01

## 2024-04-10 RX ORDER — MELOXICAM 7.5 MG/1
7.5 TABLET ORAL DAILY
Qty: 15 | Refills: 0 | Status: ACTIVE | COMMUNITY
Start: 2024-04-10 | End: 1900-01-01

## 2024-04-10 NOTE — PHYSICAL EXAM
[de-identified] : seen with her daughter seen with a left shoulder sling  Extremity exam is limited by the patient's proximal humerus fracture and use of left shoulder sling. [de-identified] : ACC: [id number] EXAM: CT CERVICAL SPINE ORDERED BY: ELIZABETH ARAGON  ACC: [id number] EXAM: CT BRAIN ORDERED BY: ELIZABETH ARAGON  PROCEDURE DATE: 03/23/2024  INTERPRETATION: CT HEAD, CT CERVICAL SPINE  INDICATIONS: head trauma, c/o left shoulder pain s/p fall. Bruising noted to left side forehead as well. Denies blood thinners or LOC.  CT BRAIN:  TECHNIQUE: Multiple contiguous axial images were obtained from the skull base to the vertex without the use of intravenous contrast.  COMPARISON EXAMINATION: Brain MRI 2/6/2024  FINDINGS: Ventricles and sulci: Parenchymal volume loss is present which is commensurate with patient age. Intra-axial: There are hemispheric white matter areas of low attenuation which are nonspecific but likely related to sequelae of microvascular disease. No intracranial mass, acute hemorrhage, or significant midline shift is present. Extra-axial: There is no extra-axial collection. Visualized sinuses: No air-fluid levels are identified. Clear. Visualized mastoids: Clear. Calvarium: No acute fracture. Hyperostosis frontalis. Miscellaneous: None.  Impression: See below  ======================================================================================   CT CERVICAL SPINE:  TECHNIQUE: Axial images were obtained through the cervical spine using multislice helical technique. Reformatted coronal and sagittal images were performed.  COMPARISON EXAMINATION: None available at this time.  FINDINGS: On the sagittal reformations, there is no prevertebral soft tissue swelling. There is no splaying of the spinous processes. Reversal the normal lordotic curvature. On the coronal reformations, occipital condyles are normal. Lateral masses of C1 align normally with C2. On the axial images, no lucent fracture line is identified. 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  Multilevel degenerative osteoarthritis is present. Findings include marginal osteophytes, uncovertebral spurring, and facet joint space compartment narrowing with subchondral sclerosis and hypertrophic osteophytes at multiple levels. There is multilevel degenerative disc disease. Findings include loss of normal disc space height and endplate sclerosis.  Miscellaneous: None.  IMPRESSIONS:  Head CT: No CT evidence of acute intracranial hemorrhage.  C-spine CT: No acute fracture.  --- End of Report ---      MILI PATEL MD; Attending Radiologist This document has been electronically signed. Mar 23 2024 8:38PM

## 2024-04-10 NOTE — REASON FOR VISIT
[Follow-Up Visit] : a follow-up visit for [Neck Pain] : neck pain [Radiculopathy] : radiculopathy [Family Member] : family member

## 2024-04-10 NOTE — HISTORY OF PRESENT ILLNESS
[Stable] : stable [___ wks] : [unfilled] week(s) ago [de-identified] : Patient fell on 3/23/2024  fractured her left shoulder seeing  and since then also tingling and numbness down her arm. Patient went to  at [facility] scan of the cervical spine. Felt tingling down left arm ~3/19/24, no specific injury, cooks at home on occasion.

## 2024-04-10 NOTE — DISCUSSION/SUMMARY
[Medication Risks Reviewed] : Medication risks reviewed [de-identified] : CT of the cervical spine obtained previously was reviewed showing degenerative changes without fracture.  The the patient's daughter reports that the patient had symptoms down her left arm prior to her fall and could be consistent with disc herniation of the cervical spine with associated cervical radiculopathy. Has left proximal humerus fracture, continue sling. will f/u with shoulder surgeon in thes office  f/u in 4 weeks can start PT for cspine when she starts PT for her left shoulder Meloxicam and gabapentin  Rx provided Check MRI of cspine at f/u may consider cervical CESAR DEXA scan and endocrine referral provided for assessment and management of osteoporosis.  The patient was educated regarding their condition, treatment options as well as prescribed course of treatment.  Risks and benefits as well as alternatives to the proposed treatment were also provided to the patient  They were given the opportunity to have all their questions answered to their satisfaction.  Vital signs were reviewed with the patient and the patient was instructed to followup with their primary care provider for further management. There were no PAs or scribes used in the evaluation, exam or treatment plan discussion. The surgeon was the primary evaluating or treating physician as noted above.

## 2024-04-25 ENCOUNTER — APPOINTMENT (OUTPATIENT)
Dept: ORTHOPEDIC SURGERY | Facility: CLINIC | Age: 84
End: 2024-04-25
Payer: MEDICARE

## 2024-04-25 DIAGNOSIS — M25.512 PAIN IN LEFT SHOULDER: ICD-10-CM

## 2024-04-25 DIAGNOSIS — S42.202D UNSPECIFIED FRACTURE OF UPPER END OF LEFT HUMERUS, SUBSEQUENT ENCOUNTER FOR FRACTURE WITH ROUTINE HEALING: ICD-10-CM

## 2024-04-25 PROCEDURE — 99213 OFFICE O/P EST LOW 20 MIN: CPT

## 2024-04-25 PROCEDURE — 73030 X-RAY EXAM OF SHOULDER: CPT | Mod: LT

## 2024-04-25 NOTE — DISCUSSION/SUMMARY
[de-identified] : Left proximal humerus fracture with mild displacement, healing well.  I discussed with her and her daughter that we can continue with nonoperative treatment.  I recommend that she start physical therapy to start range of motion of the shoulder.  She should remain nonweightbearing to the left upper extremity.  She may also discontinue using the sling.  I will see her back in 4 weeks for repeat evaluation.  I have personally obtained the history, reviewed the ROS as noted, and performed the physical examination today. The patient and I discussed the assessment and options and developed the plan. All questions were answered and the patient stated their understanding of the treatment plan and appreciation of the visit.  My cumulative time spent on this patient's visit included: Preparation for the visit, review of the medical records, review of pertinent diagnostic studies, examination and counseling of the patient on the above diagnosis, treatment plan and prognosis, orders of diagnostic tests, medications and/or appropriate procedures and documentation in the medical records of today's visit.  This note was generated using dragon medical dictation software.  A reasonable effort has been made for proofreading its contents, but typos may still remain.  If there are any questions or points of clarification needed please notify my office.

## 2024-04-25 NOTE — HISTORY OF PRESENT ILLNESS
[de-identified] : Interval history: 4/25/2024: She returns for follow-up of her left shoulder.  Translation is provided by her daughter who accompanies her today.  She says that she is feeling less pain in the shoulder.  She still has some soreness particularly with certain movements.  She has been using the sling every day and has avoided trying to raise the arm.  3/26/2024: OLGA LIDIA LANE  is a 84 year old right-hand-dominant F who presents with a left shoulder injury.  Translation is provided by her daughter who accompanies her today.  She reports that on Saturday her mother fell and landed on her left shoulder.  She went to the ER where x-rays demonstrated a fracture.  She was placed into a sling and told to follow-up.  Since then she has had pain in the left shoulder particularly with movement.  She has also had swelling and bruising about the shoulder as well.  She has been taking Motrin and Tylenol with some relief of the pain.  She does note previous left-sided neck pain with radiation down the arm prior to the fall which she says is unchanged after the fall.  She also reports a previous fall that injured her right shoulder.  Currently she does not have significant right shoulder pain.

## 2024-04-25 NOTE — PHYSICAL EXAM
[de-identified] : General: No apparent distress Cardiovascular: extremities warm and well-perfused, no cyanosis Pulmonary: non labored respirations  Musculoskeletal: Left Shoulder: Skin: Mild swelling about upper arm Active ROM and passive range of motion: Able to forward flex 40 degrees, limited by pain Fires EPL/FPL/dorsal interossei/wrist extensors Sensation intact light touch in median/ulnar/radial distributions Fingers warm and well-perfused [de-identified] : 2 views of the left shoulder obtained today and interpreted by me including Grashey AP, external rotation AP demonstrate proximal humerus fracture with similar alignment to previous x-rays and interval callus formation  Images reviewed in detail with the patient

## 2024-05-09 ENCOUNTER — APPOINTMENT (OUTPATIENT)
Dept: GERIATRICS | Facility: CLINIC | Age: 84
End: 2024-05-09
Payer: COMMERCIAL

## 2024-05-09 VITALS
RESPIRATION RATE: 14 BRPM | DIASTOLIC BLOOD PRESSURE: 68 MMHG | HEART RATE: 67 BPM | TEMPERATURE: 97.2 F | BODY MASS INDEX: 23.3 KG/M2 | WEIGHT: 145 LBS | HEIGHT: 66 IN | SYSTOLIC BLOOD PRESSURE: 100 MMHG | OXYGEN SATURATION: 98 %

## 2024-05-09 DIAGNOSIS — H91.90 UNSPECIFIED HEARING LOSS, UNSPECIFIED EAR: ICD-10-CM

## 2024-05-09 DIAGNOSIS — R63.4 ABNORMAL WEIGHT LOSS: ICD-10-CM

## 2024-05-09 DIAGNOSIS — I48.91 UNSPECIFIED ATRIAL FIBRILLATION: ICD-10-CM

## 2024-05-09 DIAGNOSIS — R26.81 UNSTEADINESS ON FEET: ICD-10-CM

## 2024-05-09 DIAGNOSIS — G30.1 ALZHEIMER'S DISEASE WITH LATE ONSET: ICD-10-CM

## 2024-05-09 DIAGNOSIS — F02.B0 ALZHEIMER'S DISEASE WITH LATE ONSET: ICD-10-CM

## 2024-05-09 DIAGNOSIS — C34.91 MALIGNANT NEOPLASM OF UNSPECIFIED PART OF RIGHT BRONCHUS OR LUNG: ICD-10-CM

## 2024-05-09 DIAGNOSIS — M84.48XS PATHOLOGICAL FRACTURE, OTHER SITE, SEQUELA: ICD-10-CM

## 2024-05-09 DIAGNOSIS — F32.A DEPRESSION, UNSPECIFIED: ICD-10-CM

## 2024-05-09 PROCEDURE — 99214 OFFICE O/P EST MOD 30 MIN: CPT

## 2024-05-09 RX ORDER — DONEPEZIL HYDROCHLORIDE 5 MG/1
5 TABLET ORAL
Qty: 90 | Refills: 0 | Status: ACTIVE | COMMUNITY
Start: 2024-05-09 | End: 1900-01-01

## 2024-05-09 NOTE — PHYSICAL EXAM
[Alert] : alert [No Acute Distress] : in no acute distress [Normal Appearance] : the appearance of the neck was normal [Supple] : the neck was supple [No Respiratory Distress] : no respiratory distress [No Acc Muscle Use] : no accessory muscle use [Auscultation Breath Sounds / Voice Sounds] : lungs were clear to auscultation bilaterally [Normal S1, S2] : normal S1 and S2 [Heart Rate And Rhythm] : heart rate was normal and rhythm regular [Abdomen Tenderness] : non-tender [Abdomen Soft] : soft [No Spinal Tenderness] : no spinal tenderness [Normal Gait] : abnormal gait [Normal Color / Pigmentation] : normal skin color and pigmentation [No Focal Deficits] : no focal deficits [de-identified] : Oriented to person and place

## 2024-05-09 NOTE — REVIEW OF SYSTEMS
[Fever] : no fever [Chills] : no chills [Heart Rate Is Slow] : the heart rate was not slow [Heart Rate Is Fast] : the heart rate was not fast [Chest Pain] : no chest pain [Palpitations] : no palpitations [Lower Ext Edema] : no lower extremity edema [Shortness Of Breath] : no shortness of breath [Wheezing] : no wheezing [Cough] : no cough [SOB on Exertion] : no shortness of breath during exertion [Abdominal Pain] : no abdominal pain [Vomiting] : no vomiting [Constipation] : constipation [Diarrhea] : no diarrhea [Dysuria] : no dysuria [Confused] : no confusion [Dizziness] : no dizziness [Anxiety] : no anxiety [Depression] : depression

## 2024-05-09 NOTE — HISTORY OF PRESENT ILLNESS
[Any fall with injury in past year] : Patient reported fall with injury in the past year [FreeTextEntry1] : 82 yo female w/ a hx of a fib, DMII, hypothyroidism, depression and dementia presents to the office for a follow up visit. Since the last visit patient had a fall that resulted in a close fracture of proximal end of left humerus. patient is following up with ortho. No surgical intervention required - non operative management. Otherwise patient states she is doing okay. No acute complaints. Pt has lost 17 lbs since the last visit. Daughter states she eats well when she was staying with her daughter but patient may not be eating as well at her own home.   Depression Taking Zoloft 50mg daily Pt states her mood is down when she has arguments with her .   Dementia  Stable. Not on any medications MOCA score on 12/14/23: 16.   a fib Taking Metoprolol 25mg BID. Rate controlled. Not on oral AC  Constipation Stable with miralax  DM Taking metformin 500mg and glyburide 3mg daily.  [de-identified] : closed fracture of proximal end of lefft humerus

## 2024-05-21 RX ORDER — SERTRALINE HYDROCHLORIDE 50 MG/1
50 TABLET, FILM COATED ORAL DAILY
Qty: 90 | Refills: 0 | Status: ACTIVE | COMMUNITY
Start: 2023-12-14 | End: 1900-01-01

## 2024-05-30 ENCOUNTER — APPOINTMENT (OUTPATIENT)
Dept: ENDOCRINOLOGY | Facility: CLINIC | Age: 84
End: 2024-05-30
Payer: MEDICARE

## 2024-05-30 ENCOUNTER — APPOINTMENT (OUTPATIENT)
Dept: GERIATRICS | Facility: CLINIC | Age: 84
End: 2024-05-30

## 2024-05-30 VITALS
HEART RATE: 65 BPM | SYSTOLIC BLOOD PRESSURE: 98 MMHG | HEIGHT: 66 IN | WEIGHT: 140 LBS | BODY MASS INDEX: 22.5 KG/M2 | OXYGEN SATURATION: 97 % | TEMPERATURE: 97 F | RESPIRATION RATE: 14 BRPM | DIASTOLIC BLOOD PRESSURE: 60 MMHG

## 2024-05-30 DIAGNOSIS — E03.9 HYPOTHYROIDISM, UNSPECIFIED: ICD-10-CM

## 2024-05-30 DIAGNOSIS — M81.0 AGE-RELATED OSTEOPOROSIS W/OUT CURRENT PATHOLOGICAL FRACTURE: ICD-10-CM

## 2024-05-30 DIAGNOSIS — E78.5 HYPERLIPIDEMIA, UNSPECIFIED: ICD-10-CM

## 2024-05-30 DIAGNOSIS — E11.9 TYPE 2 DIABETES MELLITUS W/OUT COMPLICATIONS: ICD-10-CM

## 2024-05-30 PROCEDURE — 99205 OFFICE O/P NEW HI 60 MIN: CPT

## 2024-05-30 RX ORDER — GLYBURIDE 3 MG/1
3 TABLET ORAL DAILY
Refills: 0 | Status: DISCONTINUED | COMMUNITY
Start: 2023-12-07 | End: 2024-05-30

## 2024-05-30 NOTE — REASON FOR VISIT
[Consultation] : a consultation visit [Osteoporosis] : osteoporosis [Other: ______] : provided by MAHESH

## 2024-05-30 NOTE — PHYSICAL EXAM
[de-identified] : General: No distress, well nourished Eyes: Normal Sclera, EOMI, PERRL ENT: Normal appearance of the nose, normal oropharynx Neck/Thyroid: No cervical lymphadenopathy, thyroid gland 20 g in size, no thyroid nodules, non-tender Respiratory: No use of accessory muscles of respiration, vesicular breath sounds heard bilaterally, no crepitations or ronchi Cardiovascular: S1 and S2 heard and normal, no S3 or S4, no murmurs, radial pulse normal bilaterally Abdomen: soft, non-tender, no masses, normal bowel sounds Musculoskeletal: No swelling or deformities of joints of hands, no pedal edema Neurological: Normal range of motion in the hands, Normal brachioradialis reflexes bilaterally Skin: No rashes in hands, no nodules palpated in hands  [de-identified] : DM foot exam done on 05/30/2024: No ulcers seen in feet Dorsalis pedis pulses normal bilaterally Sensation to 10 g monofilament normal in feet bilaterally

## 2024-05-30 NOTE — HISTORY OF PRESENT ILLNESS
[FreeTextEntry1] : Problems: 1. DM type 2  2. Hyperlipidemia 3. Primary hypothyroidism 4. Osteoporosis  Note - patient has atrial fibrillation  Osteoporosis 1. Patient diagnosed with osteoporosis in March 2024 2. Fractures: Patient fractured her left humerus (neck and head) in March 2024 when she fell from standing- no surgical treatment done 3. Labs: 2018 - SPEP - N May 2024 - Cr N, corrected Ca N, 25 OH vitamin D 36.8 4. Calcium and vitamin D intake: On multivitamin that she uses sporadically Patient drinks 1/2 cup of milk per day, patient does not eat cheese or yogurt or ice-cream 5. Meds: Patient never used any medications for osteoporosis in the past  DM type 2 1. Diagnosed in 2004 2. Meds: Metformin 500 mg po daily - no side effects Glyburide 3 mg po daily - I DISCONTINUED THIS ON 05/30/2024.  3. Fingersticks done once per day - 62 to 113, no hypoglycemic unawareness 4. Not on Aspirin, on atorvastatin 40 mg po daily, not ACEi/ARB 5. Complications: No DM nephropathy (normal creatinine) No DM retinopathy (last eye exam was more than one year ago, patient advised on the need for annual DM eye exam) No ASCVD No foot ulcers/amputation 6. Patient never smoked cigarettes   Primary hypothyroidism 1. Patient never had thyroid surgery/radiation/radioactive iodine therapy 2. Meds: Levothyroxine (generic) 75 micrograms po daily - fully compliant and patient advised on the appropriate use of levothyroxine

## 2024-05-30 NOTE — ASSESSMENT
[FreeTextEntry1] : Targets in patients 65 years and older: HbA1c 7 to 8%, BP < 140/90, TSH 4 to 6 but I will also accept a TSH in the normal range.    HbA1c is well below goal so I discontinued glyburide (glyburide also increases the risk of hypoglycemia in elderly patients). BP is at goal.  TSH is at goal.  The patient has osteoporosis diagnosed in March 2024 when she had a fragility fracture of the proximal humerus. I prescribed calcium and vitamin D pills on 05/30/2024. I also ordered a DXA scan. Treatment with alendronate or prolia were discussed with the patient on 05/30/2204 and patient says she will consider her treatment options for now.    Patient is on statin - no indication for ACEi/ARB or Aspirin   Last lipid panel - May 2024 - LDL 65, Trig 78 Last HbA1c - 05/28/2024 - 5.6% Last Vitamin B12 - May 2024 - elevated Last urine albumin panel - None seen Last BMP/CMP - May 2024 - Cr, K, AST, ALT N Last TSH - May 2024 - Normal Last 25 OH vitamin D level - May 2024 - 36.8    Plan: 1. Discontinue glyburide 2. Start vitamin D 1000 units po daily 3. Start calcium 600 mg po bid 4. DXA scan 5. Fingersticks to be done once daily 6. Labs to be done today - PTH level 7. Follow up in 6 weeks to review meter and results - hopefully patient will decide on treatment for her osteoporosis then No

## 2024-05-31 LAB
25(OH)D3 SERPL-MCNC: 36.8 NG/ML
ALBUMIN SERPL ELPH-MCNC: 4.5 G/DL
ALP BLD-CCNC: 71 U/L
ALT SERPL-CCNC: 12 U/L
ANION GAP SERPL CALC-SCNC: 11 MMOL/L
AST SERPL-CCNC: 20 U/L
BILIRUB SERPL-MCNC: 0.4 MG/DL
BUN SERPL-MCNC: 16 MG/DL
CALCIUM SERPL-MCNC: 9.2 MG/DL
CALCIUM SERPL-MCNC: 9.7 MG/DL
CHLORIDE SERPL-SCNC: 108 MMOL/L
CHOLEST SERPL-MCNC: 141 MG/DL
CO2 SERPL-SCNC: 25 MMOL/L
CREAT SERPL-MCNC: 1.12 MG/DL
EGFR: 49 ML/MIN/1.73M2
ESTIMATED AVERAGE GLUCOSE: 114 MG/DL
GLUCOSE SERPL-MCNC: 91 MG/DL
HBA1C MFR BLD HPLC: 5.6 %
HCT VFR BLD CALC: 31.7 %
HDLC SERPL-MCNC: 62 MG/DL
HGB BLD-MCNC: 10.2 G/DL
LDLC SERPL CALC-MCNC: 65 MG/DL
MCHC RBC-ENTMCNC: 31.8 PG
MCHC RBC-ENTMCNC: 32.2 GM/DL
MCV RBC AUTO: 98.8 FL
NONHDLC SERPL-MCNC: 80 MG/DL
PARATHYROID HORMONE INTACT: 54 PG/ML
PLATELET # BLD AUTO: 131 K/UL
POTASSIUM SERPL-SCNC: 4.6 MMOL/L
PROT SERPL-MCNC: 7.4 G/DL
RBC # BLD: 3.21 M/UL
RBC # FLD: 14.1 %
SODIUM SERPL-SCNC: 143 MMOL/L
TRIGL SERPL-MCNC: 78 MG/DL
TSH SERPL-ACNC: 0.77 UIU/ML
VIT B12 SERPL-MCNC: >2000 PG/ML
WBC # FLD AUTO: 3.58 K/UL

## 2024-05-31 RX ORDER — MIRTAZAPINE 7.5 MG/1
7.5 TABLET, FILM COATED ORAL
Qty: 30 | Refills: 1 | Status: ACTIVE | COMMUNITY
Start: 2024-05-31 | End: 1900-01-01

## 2024-06-19 ENCOUNTER — OUTPATIENT (OUTPATIENT)
Dept: OUTPATIENT SERVICES | Facility: HOSPITAL | Age: 84
LOS: 1 days | End: 2024-06-19
Payer: MEDICARE

## 2024-06-19 ENCOUNTER — APPOINTMENT (OUTPATIENT)
Dept: RADIOLOGY | Facility: IMAGING CENTER | Age: 84
End: 2024-06-19
Payer: MEDICARE

## 2024-06-19 DIAGNOSIS — Z90.49 ACQUIRED ABSENCE OF OTHER SPECIFIED PARTS OF DIGESTIVE TRACT: Chronic | ICD-10-CM

## 2024-06-19 DIAGNOSIS — Z98.890 OTHER SPECIFIED POSTPROCEDURAL STATES: Chronic | ICD-10-CM

## 2024-06-19 DIAGNOSIS — M81.0 AGE-RELATED OSTEOPOROSIS WITHOUT CURRENT PATHOLOGICAL FRACTURE: ICD-10-CM

## 2024-06-19 PROCEDURE — 77085 DXA BONE DENSITY AXL VRT FX: CPT

## 2024-06-19 PROCEDURE — 77085 DXA BONE DENSITY AXL VRT FX: CPT | Mod: 26

## 2024-07-05 ENCOUNTER — APPOINTMENT (OUTPATIENT)
Dept: PULMONOLOGY | Facility: CLINIC | Age: 84
End: 2024-07-05
Payer: MEDICARE

## 2024-07-05 VITALS — SYSTOLIC BLOOD PRESSURE: 104 MMHG | OXYGEN SATURATION: 95 % | HEART RATE: 80 BPM | DIASTOLIC BLOOD PRESSURE: 63 MMHG

## 2024-07-05 PROCEDURE — ZZZZZ: CPT

## 2024-07-05 PROCEDURE — 99214 OFFICE O/P EST MOD 30 MIN: CPT | Mod: 25

## 2024-07-05 PROCEDURE — 94727 GAS DIL/WSHOT DETER LNG VOL: CPT

## 2024-07-05 PROCEDURE — 94010 BREATHING CAPACITY TEST: CPT

## 2024-07-05 PROCEDURE — 94729 DIFFUSING CAPACITY: CPT

## 2024-07-05 RX ORDER — AZELASTINE HYDROCHLORIDE 137 UG/1
137 SPRAY, METERED NASAL
Qty: 3 | Refills: 3 | Status: ACTIVE | COMMUNITY
Start: 2024-07-05 | End: 1900-01-01

## 2024-07-15 ENCOUNTER — RESULT REVIEW (OUTPATIENT)
Age: 84
End: 2024-07-15

## 2024-07-17 ENCOUNTER — OUTPATIENT (OUTPATIENT)
Dept: OUTPATIENT SERVICES | Facility: HOSPITAL | Age: 84
LOS: 1 days | End: 2024-07-17
Payer: MEDICARE

## 2024-07-17 ENCOUNTER — APPOINTMENT (OUTPATIENT)
Dept: CT IMAGING | Facility: CLINIC | Age: 84
End: 2024-07-17
Payer: MEDICARE

## 2024-07-17 ENCOUNTER — APPOINTMENT (OUTPATIENT)
Dept: ENDOCRINOLOGY | Facility: CLINIC | Age: 84
End: 2024-07-17
Payer: MEDICARE

## 2024-07-17 VITALS
SYSTOLIC BLOOD PRESSURE: 110 MMHG | OXYGEN SATURATION: 95 % | HEART RATE: 91 BPM | TEMPERATURE: 97 F | DIASTOLIC BLOOD PRESSURE: 68 MMHG | WEIGHT: 140 LBS | RESPIRATION RATE: 14 BRPM | HEIGHT: 66 IN | BODY MASS INDEX: 22.5 KG/M2

## 2024-07-17 DIAGNOSIS — E11.9 TYPE 2 DIABETES MELLITUS W/OUT COMPLICATIONS: ICD-10-CM

## 2024-07-17 DIAGNOSIS — Z98.890 OTHER SPECIFIED POSTPROCEDURAL STATES: Chronic | ICD-10-CM

## 2024-07-17 DIAGNOSIS — M81.0 AGE-RELATED OSTEOPOROSIS W/OUT CURRENT PATHOLOGICAL FRACTURE: ICD-10-CM

## 2024-07-17 DIAGNOSIS — Z90.49 ACQUIRED ABSENCE OF OTHER SPECIFIED PARTS OF DIGESTIVE TRACT: Chronic | ICD-10-CM

## 2024-07-17 DIAGNOSIS — C34.91 MALIGNANT NEOPLASM OF UNSPECIFIED PART OF RIGHT BRONCHUS OR LUNG: ICD-10-CM

## 2024-07-17 DIAGNOSIS — R63.4 ABNORMAL WEIGHT LOSS: ICD-10-CM

## 2024-07-17 DIAGNOSIS — E78.5 HYPERLIPIDEMIA, UNSPECIFIED: ICD-10-CM

## 2024-07-17 PROCEDURE — 71260 CT THORAX DX C+: CPT | Mod: 26,MH

## 2024-07-17 PROCEDURE — G2211 COMPLEX E/M VISIT ADD ON: CPT

## 2024-07-17 PROCEDURE — 74177 CT ABD & PELVIS W/CONTRAST: CPT

## 2024-07-17 PROCEDURE — 74177 CT ABD & PELVIS W/CONTRAST: CPT | Mod: 26,MH

## 2024-07-17 PROCEDURE — 99215 OFFICE O/P EST HI 40 MIN: CPT

## 2024-07-17 PROCEDURE — 71260 CT THORAX DX C+: CPT

## 2024-07-17 RX ADMIN — DENOSUMAB 0 MG/ML: 60 INJECTION SUBCUTANEOUS at 00:00

## 2024-07-19 ENCOUNTER — APPOINTMENT (OUTPATIENT)
Dept: PULMONOLOGY | Facility: CLINIC | Age: 84
End: 2024-07-19
Payer: MEDICARE

## 2024-07-19 VITALS
HEART RATE: 80 BPM | RESPIRATION RATE: 16 BRPM | SYSTOLIC BLOOD PRESSURE: 106 MMHG | OXYGEN SATURATION: 94 % | DIASTOLIC BLOOD PRESSURE: 71 MMHG

## 2024-07-19 DIAGNOSIS — M84.48XS PATHOLOGICAL FRACTURE, OTHER SITE, SEQUELA: ICD-10-CM

## 2024-07-19 DIAGNOSIS — R09.82 POSTNASAL DRIP: ICD-10-CM

## 2024-07-19 DIAGNOSIS — C34.91 MALIGNANT NEOPLASM OF UNSPECIFIED PART OF RIGHT BRONCHUS OR LUNG: ICD-10-CM

## 2024-07-19 DIAGNOSIS — E03.9 HYPOTHYROIDISM, UNSPECIFIED: ICD-10-CM

## 2024-07-19 DIAGNOSIS — R05.9 COUGH, UNSPECIFIED: ICD-10-CM

## 2024-07-19 DIAGNOSIS — R91.8 OTHER NONSPECIFIC ABNORMAL FINDING OF LUNG FIELD: ICD-10-CM

## 2024-07-19 PROCEDURE — 99214 OFFICE O/P EST MOD 30 MIN: CPT

## 2024-07-23 ENCOUNTER — NON-APPOINTMENT (OUTPATIENT)
Age: 84
End: 2024-07-23

## 2024-07-25 ENCOUNTER — APPOINTMENT (OUTPATIENT)
Dept: GERIATRICS | Facility: CLINIC | Age: 84
End: 2024-07-25
Payer: MEDICARE

## 2024-07-25 VITALS
WEIGHT: 132 LBS | DIASTOLIC BLOOD PRESSURE: 68 MMHG | HEIGHT: 66 IN | OXYGEN SATURATION: 97 % | RESPIRATION RATE: 12 BRPM | BODY MASS INDEX: 21.21 KG/M2 | TEMPERATURE: 98.4 F | HEART RATE: 73 BPM | SYSTOLIC BLOOD PRESSURE: 112 MMHG

## 2024-07-25 DIAGNOSIS — F02.B0 ALZHEIMER'S DISEASE WITH LATE ONSET: ICD-10-CM

## 2024-07-25 DIAGNOSIS — G30.1 ALZHEIMER'S DISEASE WITH LATE ONSET: ICD-10-CM

## 2024-07-25 DIAGNOSIS — R63.4 ABNORMAL WEIGHT LOSS: ICD-10-CM

## 2024-07-25 PROCEDURE — 99214 OFFICE O/P EST MOD 30 MIN: CPT

## 2024-07-25 NOTE — REVIEW OF SYSTEMS
[Constipation] : constipation [As Noted in HPI] : as noted in HPI [Sleep Disturbances] : sleep disturbances [Negative] : Integumentary

## 2024-07-25 NOTE — ASSESSMENT
[FreeTextEntry1] : Alz Dementia: counselling provided day regarding setting up meal prep for patient home health aide services - referral to [facility] at home provided patient will need mandarin speaking aide  referral  continue off donepezil for possible GI side effects c/w assistance with medications  weight loss: CT chest/abpelv with new pulmonary nodule and increased size in adrenal nodule which will require follow up imaging and follow up with endocrine and CTSx in mean time, assistance with meal reminders and plan as above to start with assistance with meal prep  f/u 2 months with Flori Camp

## 2024-07-25 NOTE — HISTORY OF PRESENT ILLNESS
[FreeTextEntry1] : 84yoF with dementia seen for follow up with her daughter Ama.  weight loss: daughter reports patient has lack of appetite no formal meal preparation currently sometimes has person come to cook food for her but this is rarely  Dementia: donepezil on hold due to lack of appetite as possible side effect mirtazepine caused side effects with hallucinations and it was discontinued patient's  helps with medications at home sleep distrubance   recent CT chest/ab/pelvis for weight loss reviewed  new pulmonary 4mm right lower lobe nodule and increase in size of left adrenal nodule. on the discharge service for the patient. I have reviewed and made amendments to the documentation where necessary.

## 2024-07-25 NOTE — PHYSICAL EXAM
[Alert] : alert [No Acute Distress] : in no acute distress [Sclera] : the sclera and conjunctiva were normal [EOMI] : extraocular movements were intact [Normal Outer Ear/Nose] : the ears and nose were normal in appearance [Normal Appearance] : the appearance of the neck was normal [No Respiratory Distress] : no respiratory distress [No Acc Muscle Use] : no accessory muscle use [Respiration, Rhythm And Depth] : normal respiratory rhythm and effort [Auscultation Breath Sounds / Voice Sounds] : lungs were clear to auscultation bilaterally [Normal S1, S2] : normal S1 and S2 [Heart Rate And Rhythm] : heart rate was normal and rhythm regular [Edema] : edema was not present [Abdomen Tenderness] : non-tender [Abdomen Soft] : soft [Normal Color / Pigmentation] : normal skin color and pigmentation [Involuntary Movements] : no involuntary movements were seen [No Focal Deficits] : no focal deficits [Normal Affect] : the affect was normal [Normal Mood] : the mood was normal [de-identified] : cane

## 2024-08-08 ENCOUNTER — NON-APPOINTMENT (OUTPATIENT)
Age: 84
End: 2024-08-08

## 2024-08-13 ENCOUNTER — APPOINTMENT (OUTPATIENT)
Dept: CT IMAGING | Facility: HOSPITAL | Age: 84
End: 2024-08-13

## 2024-08-15 ENCOUNTER — NON-APPOINTMENT (OUTPATIENT)
Age: 84
End: 2024-08-15

## 2024-08-20 ENCOUNTER — APPOINTMENT (OUTPATIENT)
Dept: THORACIC SURGERY | Facility: CLINIC | Age: 84
End: 2024-08-20
Payer: COMMERCIAL

## 2024-08-20 VITALS
RESPIRATION RATE: 17 BRPM | WEIGHT: 133 LBS | HEART RATE: 65 BPM | OXYGEN SATURATION: 99 % | SYSTOLIC BLOOD PRESSURE: 113 MMHG | HEIGHT: 66 IN | BODY MASS INDEX: 21.38 KG/M2 | DIASTOLIC BLOOD PRESSURE: 70 MMHG

## 2024-08-20 DIAGNOSIS — C34.91 MALIGNANT NEOPLASM OF UNSPECIFIED PART OF RIGHT BRONCHUS OR LUNG: ICD-10-CM

## 2024-08-20 DIAGNOSIS — E27.9 DISORDER OF ADRENAL GLAND, UNSPECIFIED: ICD-10-CM

## 2024-08-20 PROCEDURE — 99214 OFFICE O/P EST MOD 30 MIN: CPT

## 2024-08-20 PROCEDURE — 99204 OFFICE O/P NEW MOD 45 MIN: CPT

## 2024-08-20 RX ORDER — MELATONIN 3 MG
3 CAPSULE ORAL
Refills: 0 | Status: ACTIVE | COMMUNITY

## 2024-08-20 NOTE — CONSULT LETTER
[Dear  ___] : Dear  [unfilled], [Courtesy Letter:] : I had the pleasure of seeing your patient, [unfilled], in my office today. [Please see my note below.] : Please see my note below. [Sincerely,] : Sincerely, [FreeTextEntry2] : Ilya Alaniz MD (Med Onc)\par  Brigitte Mitchell DO (Pulm)\par  Nick Caba (PCP) \par   [FreeTextEntry3] : Javad Corbin MD, FACS \par  Chief, Division of Thoracic Surgery \par  Director, Minimally Invasive Thoracic Surgery \par  Department of Cardiovascular and Thoracic Surgery \par  [facility] \par  , Cardiovascular and Thoracic Surgery\par  \par  \par

## 2024-08-20 NOTE — HISTORY OF PRESENT ILLNESS
General [FreeTextEntry1] : 84 year old Mandarin speaking female, s/p FB, uniportal right VATS, right upper lobectomy with en bloc wedge resection of RML, and MLND on 1/10/18. Pathology revealed RUL T2aN0 adenocarcinoma, tumor involves RML, 1.2 x 0.8 x 0.5 cm, positive visceral pleura and lymph vascular invasion. She has completed chemotherapy with Dr. Davis (Oncology), not f/u currently.   She then underwent CT guided core biopsy of RML nodule on 3/21/19 with IR. Pathology was negative for malignant cells.   CXR on 1/21/19 revealed: - There is right lung volume loss with postsurgical change including chain sutures and surgical clips.  - There is a 4 mm peripheral left midlung nodule.  - The lungs are otherwise clear.   CT chest scan 7/8/2020: -4mm sized peripheral calcified nodule in the MANDEEP -No solid or gg nodules.  -no evidence of recurrent or metastatic nodules.   CXR on 01/28/2021: - post-op changes - stable 4 mm peripheral left midlung  CT Chest on 7/23/21: - RUL postop changes.  - Unchanged, MANDEEP subpleural calcified nodules   CXR on 02/07/2022: - The lungs demonstrate stable 5 mm nodular density in the left lower lobe. Linear subsegmental atelectatic changes are seen at the left base.  CT Chest on 8/9/2023: Carestream ID [id number]  - post-op changes - 2 mm left upper lobe pulmonary nodule on image 96 of series 3 is also unchanged. - 2 mm right lower lobe pulmonary nodule on image 117 of series 3 is unchanged since July 8, 2020 chest CT. - 4 mm right middle lobe pulmonary nodule adjacent to the fissure on image 45 of series 3 is unchanged since July 8, 2020. - Vertically oriented and ill-defined groundglass opacity measuring about 5 mm in its longest dimension as measured on the axial images on image 46 of series 3 adjacent to the fissure is without significant interval change since August 8, 2022 although it demonstrates interval increase in size since July 8, 2020 when a 2 mm nodular opacity is noted.  CT Chest on 2/6/24:  - s/p RULobectomy. A 0.5 cm groundglass nodule in the RLL as well as 0.4 cm solid nodules in the RML and LLL are noted. They are unchanged when compared to previous exam. - Calcified nodule is noted in the MANDEEP.  - Below the diaphragm, visualized portions of the abdomen demonstrate small calcification in the gallbladder. Thickening of both adrenal glands is noted.  Patient was diagnosed with Alzheimer in 12/2023 with hallucination, ~ 30 lbs weight loss over 6 months. PCP added CT A/P.    CT Chest/Abdomen/Pelvis on 7/17/24:  - Coronary artery calcifications; Mitral annular calcifications. - Post op changes  - New 4 mm RLL nodule (series 2, image 77) - Stable few pulmonary nodules including a solid 4 mm RML nodule (series 2, image 53) - No intrahepatic biliary dilatation - Gallbladder: Fluid filled - Adrenal glands: Interval increase in size of a 1.9 x 1.2 cm left adrenal nodule (series 2, image 152). - Mild compression deformity of the L1 vertebral body.  Patient is here today for a follow up. Patient denies shortness of breath, cough, chest pain, fever, chills. c/o loss of appetite.

## 2024-08-20 NOTE — CONSULT LETTER
[Dear  ___] : Dear  [unfilled], [Courtesy Letter:] : I had the pleasure of seeing your patient, [unfilled], in my office today. [Please see my note below.] : Please see my note below. [Sincerely,] : Sincerely, [FreeTextEntry2] : Ilya Alaniz MD (Med Onc)\par  Brigitte Mitchell DO (Pulm)\par  Nick Caba (PCP) \par   [FreeTextEntry3] : Javad Corbin MD, FACS \par  Chief, Division of Thoracic Surgery \par  Director, Minimally Invasive Thoracic Surgery \par  Department of Cardiovascular and Thoracic Surgery \par  [facility] \par  , Cardiovascular and Thoracic Surgery\par  \par  \par

## 2024-08-20 NOTE — ASSESSMENT
[FreeTextEntry1] : 84 year old Mandarin speaking female, s/p FB, uniportal right VATS, right upper lobectomy with en bloc wedge resection of RML, and MLND on 1/10/18. Pathology revealed RUL T2aN0 adenocarcinoma, tumor involves RML, 1.2 x 0.8 x 0.5 cm, positive visceral pleura and lymph vascular invasion. She has completed chemotherapy with Dr. Davis (Oncology), not f/u currently.   I have reviewed the patient's medical records and diagnostic images at time of this office consultation and have made the following recommendation: 1. CT chest reviewed and explained to patient and her family member, new lung nodule with enlarged left adrenal nodule, we discussed to get PET/CT, MRI of abdomen w/w/o contrast (Adrenal protocol) for further evaluation.  2. Refer to IR for a CT guided biopsy of left adrenal nodule by Dr. Mejia.  3. RTC after to discuss findings.   I, MIGUEL ANGEL Serrano, personally performed the evaluation and management (E/M) services for this established patient who follow up today with an existing condition.  That E/M includes conducting the examination, assessing all new/exacerbated/existing conditions, and establishing a plan of care.  Today, my ACP, BRANDY RhoadesC, was here to observe my evaluation and management services for this existing condition to be followed going forward.

## 2024-08-20 NOTE — HISTORY OF PRESENT ILLNESS
[FreeTextEntry1] : 84 year old Mandarin speaking female, s/p FB, uniportal right VATS, right upper lobectomy with en bloc wedge resection of RML, and MLND on 1/10/18. Pathology revealed RUL T2aN0 adenocarcinoma, tumor involves RML, 1.2 x 0.8 x 0.5 cm, positive visceral pleura and lymph vascular invasion. She has completed chemotherapy with Dr. Davis (Oncology), not f/u currently.   She then underwent CT guided core biopsy of RML nodule on 3/21/19 with IR. Pathology was negative for malignant cells.   CXR on 1/21/19 revealed: - There is right lung volume loss with postsurgical change including chain sutures and surgical clips.  - There is a 4 mm peripheral left midlung nodule.  - The lungs are otherwise clear.   CT chest scan 7/8/2020: -4mm sized peripheral calcified nodule in the MANDEEP -No solid or gg nodules.  -no evidence of recurrent or metastatic nodules.   CXR on 01/28/2021: - post-op changes - stable 4 mm peripheral left midlung  CT Chest on 7/23/21: - RUL postop changes.  - Unchanged, MANDEEP subpleural calcified nodules   CXR on 02/07/2022: - The lungs demonstrate stable 5 mm nodular density in the left lower lobe. Linear subsegmental atelectatic changes are seen at the left base.  CT Chest on 8/9/2023: Carestream ID [id number]  - post-op changes - 2 mm left upper lobe pulmonary nodule on image 96 of series 3 is also unchanged. - 2 mm right lower lobe pulmonary nodule on image 117 of series 3 is unchanged since July 8, 2020 chest CT. - 4 mm right middle lobe pulmonary nodule adjacent to the fissure on image 45 of series 3 is unchanged since July 8, 2020. - Vertically oriented and ill-defined groundglass opacity measuring about 5 mm in its longest dimension as measured on the axial images on image 46 of series 3 adjacent to the fissure is without significant interval change since August 8, 2022 although it demonstrates interval increase in size since July 8, 2020 when a 2 mm nodular opacity is noted.  CT Chest on 2/6/24:  - s/p RULobectomy. A 0.5 cm groundglass nodule in the RLL as well as 0.4 cm solid nodules in the RML and LLL are noted. They are unchanged when compared to previous exam. - Calcified nodule is noted in the MANDEEP.  - Below the diaphragm, visualized portions of the abdomen demonstrate small calcification in the gallbladder. Thickening of both adrenal glands is noted.  Patient was diagnosed with Alzheimer in 12/2023 with hallucination, ~ 30 lbs weight loss over 6 months. PCP added CT A/P.    CT Chest/Abdomen/Pelvis on 7/17/24:  - Coronary artery calcifications; Mitral annular calcifications. - Post op changes  - New 4 mm RLL nodule (series 2, image 77) - Stable few pulmonary nodules including a solid 4 mm RML nodule (series 2, image 53) - No intrahepatic biliary dilatation - Gallbladder: Fluid filled - Adrenal glands: Interval increase in size of a 1.9 x 1.2 cm left adrenal nodule (series 2, image 152). - Mild compression deformity of the L1 vertebral body.  Patient is here today for a follow up. Patient denies shortness of breath, cough, chest pain, fever, chills. c/o loss of appetite.

## 2024-08-21 ENCOUNTER — OUTPATIENT (OUTPATIENT)
Dept: OUTPATIENT SERVICES | Facility: HOSPITAL | Age: 84
LOS: 1 days | End: 2024-08-21
Payer: MEDICARE

## 2024-08-21 ENCOUNTER — APPOINTMENT (OUTPATIENT)
Dept: MRI IMAGING | Facility: HOSPITAL | Age: 84
End: 2024-08-21
Payer: MEDICARE

## 2024-08-21 DIAGNOSIS — Z98.890 OTHER SPECIFIED POSTPROCEDURAL STATES: Chronic | ICD-10-CM

## 2024-08-21 DIAGNOSIS — Z90.49 ACQUIRED ABSENCE OF OTHER SPECIFIED PARTS OF DIGESTIVE TRACT: Chronic | ICD-10-CM

## 2024-08-21 DIAGNOSIS — E27.9 DISORDER OF ADRENAL GLAND, UNSPECIFIED: ICD-10-CM

## 2024-08-21 LAB
APTT BLD: 31.7 SEC
HCT VFR BLD CALC: 32.8 %
HGB BLD-MCNC: 10.6 G/DL
INR PPP: 0.93 RATIO
MCHC RBC-ENTMCNC: 31 PG
MCHC RBC-ENTMCNC: 32.3 GM/DL
MCV RBC AUTO: 95.9 FL
PLATELET # BLD AUTO: 140 K/UL
PT BLD: 10.6 SEC
RBC # BLD: 3.42 M/UL
RBC # FLD: 13.8 %
WBC # FLD AUTO: 3.09 K/UL

## 2024-08-21 PROCEDURE — 74183 MRI ABD W/O CNTR FLWD CNTR: CPT

## 2024-08-21 PROCEDURE — 74183 MRI ABD W/O CNTR FLWD CNTR: CPT | Mod: 26,MH

## 2024-08-21 PROCEDURE — A9579: CPT

## 2024-08-21 RX ORDER — TRAZODONE HYDROCHLORIDE 50 MG/1
50 TABLET ORAL
Qty: 30 | Refills: 0 | Status: ACTIVE | COMMUNITY
Start: 2024-08-21 | End: 1900-01-01

## 2024-08-22 LAB
ANION GAP SERPL CALC-SCNC: 12 MMOL/L
BUN SERPL-MCNC: 16 MG/DL
CALCIUM SERPL-MCNC: 9.6 MG/DL
CHLORIDE SERPL-SCNC: 105 MMOL/L
CO2 SERPL-SCNC: 25 MMOL/L
CREAT SERPL-MCNC: 1.09 MG/DL
EGFR: 51 ML/MIN/1.73M2
GLUCOSE SERPL-MCNC: 111 MG/DL
POTASSIUM SERPL-SCNC: 4.9 MMOL/L
SODIUM SERPL-SCNC: 142 MMOL/L

## 2024-08-27 ENCOUNTER — APPOINTMENT (OUTPATIENT)
Dept: NUCLEAR MEDICINE | Facility: CLINIC | Age: 84
End: 2024-08-27
Payer: MEDICARE

## 2024-08-27 PROCEDURE — A9552: CPT

## 2024-08-27 PROCEDURE — 78815 PET IMAGE W/CT SKULL-THIGH: CPT | Mod: PI

## 2024-08-29 ENCOUNTER — APPOINTMENT (OUTPATIENT)
Dept: PULMONOLOGY | Facility: CLINIC | Age: 84
End: 2024-08-29

## 2024-09-10 ENCOUNTER — APPOINTMENT (OUTPATIENT)
Dept: INTERVENTIONAL RADIOLOGY/VASCULAR | Facility: CLINIC | Age: 84
End: 2024-09-10
Payer: MEDICARE

## 2024-09-10 VITALS — BODY MASS INDEX: 21.69 KG/M2 | WEIGHT: 135 LBS | HEIGHT: 66 IN

## 2024-09-10 DIAGNOSIS — E27.9 DISORDER OF ADRENAL GLAND, UNSPECIFIED: ICD-10-CM

## 2024-09-10 DIAGNOSIS — R91.8 OTHER NONSPECIFIC ABNORMAL FINDING OF LUNG FIELD: ICD-10-CM

## 2024-09-10 PROCEDURE — 99203 OFFICE O/P NEW LOW 30 MIN: CPT

## 2024-09-10 RX ORDER — ATORVASTATIN CALCIUM 40 MG/1
40 TABLET, FILM COATED ORAL
Refills: 0 | Status: ACTIVE | COMMUNITY

## 2024-09-10 RX ORDER — TRAZODONE HYDROCHLORIDE 300 MG/1
TABLET ORAL
Refills: 0 | Status: ACTIVE | COMMUNITY

## 2024-09-11 NOTE — REASON FOR VISIT
[Consultation] : a consultation visit [Home] : at home, [unfilled] , at the time of the visit. [Medical Office: ([facility])___] : at the medical office located in  [Patient] : the patient [Self] : self [Family Member] : family member [FreeTextEntry1] : left adrenal nodule bx

## 2024-09-11 NOTE — ASSESSMENT
[FreeTextEntry1] : 84-year-old Mandarin speaking female, hx of Dm, s/p FB, uniportal right VATS, right upper lobectomy with en bloc wedge resection of RML, and MLND on 1/10/18. Pathology revealed RUL T2aN0 adenocarcinoma, tumor involves RML, 1.2 x 0.8 x 0.5 cm, positive visceral pleura and lymph vascular invasion. She has completed chemotherapy with Dr. Davis (Oncology), not f/u currently.  CT Chest/Abdomen/Pelvis on 7/17/24: Coronary artery calcifications; Mitral annular calcifications. Post op changes New 4 mm RLL nodule (series 2, image 77) Stable few pulmonary nodules including a solid 4 mm RML nodule (series 2, image 53) No intrahepatic biliary dilatation Gallbladder: Fluid filled Adrenal glands: Interval increase in size of a 1.9 x 1.2 cm left adrenal nodule (series 2, image 152). Mild compression deformity of the L1 vertebral body. Patient was seen by Dr. Corbin and is now being referred for consultation to discuss left adrenal nodule bx.   The patient is an appropriate candidate for CT-guided left adrenal nodule biopsy.  The full procedure of adrenal nodule biopsy was discussed with the patient and her daughters.  This included a discussion of the risks, benefits, and alternatives.  Risk discussed included, but were not limited to, risk of bleeding, infection, adjacent organ injury including lung injury.  The potential need for admission, transfusions, and other procedures including chest tube insertion were discussed.  Ample time was provided to answer their questions.  Consent was obtained from the patient's daughter at the time of consultation.  Plan:  CT-guided left adrenal nodule biopsy with sedation Patient in prone versus left lateral decubitus position.

## 2024-09-11 NOTE — HISTORY OF PRESENT ILLNESS
[FreeTextEntry1] : 84-year-old Mandarin speaking female, hx of Dm, s/p FB, uniportal right VATS, right upper lobectomy with en bloc wedge resection of RML, and MLND on 1/10/18. Pathology revealed RUL T2aN0 adenocarcinoma, tumor involves RML, 1.2 x 0.8 x 0.5 cm, positive visceral pleura and lymph vascular invasion. She has completed chemotherapy with Dr. Davis (Oncology), not f/u currently.  CT Chest/Abdomen/Pelvis on 7/17/24: Coronary artery calcifications; Mitral annular calcifications. Post op changes New 4 mm RLL nodule (series 2, image 77) Stable few pulmonary nodules including a solid 4 mm RML nodule (series 2, image 53) No intrahepatic biliary dilatation Gallbladder: Fluid filled Adrenal glands: Interval increase in size of a 1.9 x 1.2 cm left adrenal nodule (series 2, image 152). Mild compression deformity of the L1 vertebral body. Patient was seen by Dr. Corbin and is now being referred for consultation to discuss left adrenal nodule bx.   Denies any recent SOB, CP, fever, chills, n/v/d.  Jayy Stout- Daughter   *patient family aware to hold Metformin for 24 hours prior to procedure

## 2024-09-11 NOTE — REVIEW OF SYSTEMS
[Fever] : no fever [Chills] : no chills [Feeling Tired] : not feeling tired [Nosebleeds] : no nosebleeds [Sore Throat] : no sore throat [Chest Pain] : no chest pain [Palpitations] : no palpitations [Shortness Of Breath] : no shortness of breath [Wheezing] : no wheezing [Cough] : no cough [Abdominal Pain] : no abdominal pain [Vomiting] : no vomiting [Constipation] : no constipation [Easy Bleeding] : no tendency for easy bleeding [Easy Bruising] : no tendency for easy bruising

## 2024-09-11 NOTE — REASON FOR VISIT
[Consultation] : a consultation visit [Home] : at home, [unfilled] , at the time of the visit. [Medical Office: ([facility])___] : at the medical office located in  [Patient] : the patient [Self] : self [Family Member] : family member [FreeTextEntry1] : left adrenal nodule bx

## 2024-09-11 NOTE — REASON FOR VISIT
[Consultation] : a consultation visit [Home] : at home, [unfilled] , at the time of the visit. [Medical Office: ([facility])___] : at the medical office located in  [Patient] : the patient [Self] : self [Family Member] : family member [FreeTextEntry1] : left adrenal nodule bx

## 2024-09-17 ENCOUNTER — OUTPATIENT (OUTPATIENT)
Dept: OUTPATIENT SERVICES | Facility: HOSPITAL | Age: 84
LOS: 1 days | End: 2024-09-17

## 2024-09-17 VITALS
OXYGEN SATURATION: 99 % | WEIGHT: 132.06 LBS | HEIGHT: 63 IN | HEART RATE: 68 BPM | SYSTOLIC BLOOD PRESSURE: 104 MMHG | DIASTOLIC BLOOD PRESSURE: 63 MMHG | TEMPERATURE: 98 F | RESPIRATION RATE: 116 BRPM

## 2024-09-17 DIAGNOSIS — Z98.890 OTHER SPECIFIED POSTPROCEDURAL STATES: Chronic | ICD-10-CM

## 2024-09-17 DIAGNOSIS — E11.9 TYPE 2 DIABETES MELLITUS WITHOUT COMPLICATIONS: ICD-10-CM

## 2024-09-17 DIAGNOSIS — E27.9 DISORDER OF ADRENAL GLAND, UNSPECIFIED: ICD-10-CM

## 2024-09-17 DIAGNOSIS — E27.8 OTHER SPECIFIED DISORDERS OF ADRENAL GLAND: ICD-10-CM

## 2024-09-17 DIAGNOSIS — Z90.49 ACQUIRED ABSENCE OF OTHER SPECIFIED PARTS OF DIGESTIVE TRACT: Chronic | ICD-10-CM

## 2024-09-17 NOTE — H&P PST ADULT - PROBLEM SELECTOR PLAN 2
Monitor BS A.M of surgery. Pt/daughter instructed not to take Metformin on the day of surgery. Both verbalized understanding.

## 2024-09-17 NOTE — H&P PST ADULT - NEGATIVE OPHTHALMOLOGIC SYMPTOMS
no photophobia/no lacrimation L/no lacrimation R/no blurred vision L/no blurred vision R/no pain L/no pain R no photophobia/no blurred vision L/no blurred vision R/no pain L/no pain R/no loss of vision L/no loss of vision R

## 2024-09-17 NOTE — H&P PST ADULT - NSICDXPASTSURGICALHX_GEN_ALL_CORE_FT
PAST SURGICAL HISTORY:  H/O: hysterectomy 1985 -menorrhagia, benign disease    History of lung surgery 1/10/18 - unilateral right VATS RU lobectomy with en bloc wedge resection of RML, MLND, and intercostal nerve block    S/P appendectomy greater than 30 years ago     PAST SURGICAL HISTORY:  H/O: hysterectomy 1985 -menorrhagia, benign disease    History of lung surgery 1/10/18 - unilateral right VATS RU lobectomy with en bloc wedge resection of RML, MLND, and intercostal nerve block    S/P appendectomy greater than 30 years ago    Status post lumbar laminectomy

## 2024-09-17 NOTE — H&P PST ADULT - NEUROLOGICAL SYMPTOMS
s/p fall 2 months ago - evaluated at [facility]. Fx left  head  of humerus/weakness/paresthesias/difficulty walking/headache s/p fall on 03/2024 - evaluated at [facility]. X-ray showed Fx left  head of humerus - had P.T/weakness/paresthesias/difficulty walking/headache

## 2024-09-17 NOTE — H&P PST ADULT - PROBLEM SELECTOR PLAN 1
Schedule for Adrenal mass biopsy tentatively on 09/23/24. Pre op instructions, famotidine, chlorhexidine gluconate soap given and explained. Pt verbalized understanding.

## 2024-09-17 NOTE — H&P PST ADULT - NEGATIVE GASTROINTESTINAL SYMPTOMS
no nausea/no vomiting/no diarrhea/no constipation/no change in bowel habits/no abdominal pain no nausea/no vomiting/no diarrhea/no constipation/no change in bowel habits/no abdominal pain/no melena/no jaundice

## 2024-09-17 NOTE — H&P PST ADULT - MUSCULOSKELETAL
details… no joint warmth/no calf tenderness/decreased ROM due to pain/decreased strength/abnormal gait

## 2024-09-17 NOTE — H&P PST ADULT - NEGATIVE GENERAL GENITOURINARY SYMPTOMS
no hematuria/no renal colic/no flank pain L/no bladder infections no hematuria/no renal colic/no flank pain L/no flank pain R/no bladder infections/no dysuria

## 2024-09-17 NOTE — H&P PST ADULT - PROBLEM SELECTOR PLAN 3
POST OP DELIRIUM SCREENING   1. Patient eligible for victor hugo risk screen age>75? - Yes  2. Health care proxy paperwork given to patient? Yes - Paperwork given and explained   3. Impaired mobility (ie: uses cane, walker, wheelchair, or assist device)? yes  4. Known dementia diagnosis? - no  5. Impaired functional status (METS<4)? - no  6. Malnutrition BMI<20? - no  Email sent to surgeon if risk factor identified

## 2024-09-17 NOTE — H&P PST ADULT - NSANTHOSAYNRD_GEN_A_CORE
No. BETITO screening performed.  STOP BANG Legend: 0-2 = LOW Risk; 3-4 = INTERMEDIATE Risk; 5-8 = HIGH Risk

## 2024-09-17 NOTE — H&P PST ADULT - HISTORY OF PRESENT ILLNESS
85 y/o Mandarin speaking female with Hx of adenocarcinoma of lung, s/p surgery, type 2 diabetes, A-fib   routing F/U with Dr. Corbin on 08/2024. S/P CT scan which showed an adrenal mass      with many years of back pain. Denies any acute injury. Failed conservative threrapy. Pain 10/10 pain all time. Was recommended surgery 8 years ago. Because of chronic pain was consulted with Dr Cross , who advised surgery.     85 y/o Mandarin speaking female with Hx of AFIB., DM type 2, Hypothyroidism, HTN, adenocarcinoma of lung - s/p right VATS, right upper lobectomy (01/10/2018) presents for pe op evaluation accompanied by daughter who stated that on routine F/U visit with Dr. Corbin Ct scan of chest/abd./pelvis done on 07/2024 adrenal mass was noted. Schedule for Adrenal mass biopsy tentatively on 09/23/24.

## 2024-09-17 NOTE — H&P PST ADULT - NEGATIVE ENMT SYMPTOMS
no ear pain/no tinnitus/no vertigo/no sinus symptoms/no nasal congestion/no nose bleeds/no abnormal taste sensation/no dysphagia no ear pain/no tinnitus/no vertigo/no sinus symptoms/no nasal congestion/no nose bleeds/no abnormal taste sensation/no throat pain/no dysphagia

## 2024-09-23 ENCOUNTER — RESULT REVIEW (OUTPATIENT)
Age: 84
End: 2024-09-23

## 2024-09-23 ENCOUNTER — OUTPATIENT (OUTPATIENT)
Dept: OUTPATIENT SERVICES | Facility: HOSPITAL | Age: 84
LOS: 1 days | End: 2024-09-23
Payer: MEDICARE

## 2024-09-23 VITALS
RESPIRATION RATE: 20 BRPM | HEART RATE: 66 BPM | TEMPERATURE: 98 F | SYSTOLIC BLOOD PRESSURE: 117 MMHG | DIASTOLIC BLOOD PRESSURE: 61 MMHG | OXYGEN SATURATION: 99 %

## 2024-09-23 VITALS
HEART RATE: 69 BPM | SYSTOLIC BLOOD PRESSURE: 121 MMHG | DIASTOLIC BLOOD PRESSURE: 54 MMHG | OXYGEN SATURATION: 100 % | RESPIRATION RATE: 16 BRPM

## 2024-09-23 DIAGNOSIS — Z90.49 ACQUIRED ABSENCE OF OTHER SPECIFIED PARTS OF DIGESTIVE TRACT: Chronic | ICD-10-CM

## 2024-09-23 DIAGNOSIS — Z85.118 PERSONAL HISTORY OF OTHER MALIGNANT NEOPLASM OF BRONCHUS AND LUNG: ICD-10-CM

## 2024-09-23 DIAGNOSIS — Z98.890 OTHER SPECIFIED POSTPROCEDURAL STATES: Chronic | ICD-10-CM

## 2024-09-23 DIAGNOSIS — E27.9 DISORDER OF ADRENAL GLAND, UNSPECIFIED: ICD-10-CM

## 2024-09-23 PROCEDURE — 10009 FNA BX W/CT GDN 1ST LES: CPT

## 2024-09-23 PROCEDURE — 88173 CYTOPATH EVAL FNA REPORT: CPT | Mod: 26

## 2024-09-23 PROCEDURE — 71045 X-RAY EXAM CHEST 1 VIEW: CPT | Mod: 26

## 2024-09-23 RX ORDER — FLUTICASONE PROPIONATE 220 MCG
1 AEROSOL WITH ADAPTER (GRAM) INHALATION
Refills: 0 | DISCHARGE

## 2024-09-23 RX ORDER — LEVOTHYROXINE SODIUM 100 MCG
1 TABLET ORAL
Refills: 0 | DISCHARGE

## 2024-09-23 RX ORDER — AZELASTINE HYDROCHLORIDE 205.5 UG/1
1 SPRAY, METERED NASAL
Refills: 0 | DISCHARGE

## 2024-09-23 RX ORDER — ACETAMINOPHEN 325 MG
650 TABLET ORAL ONCE
Refills: 0 | Status: DISCONTINUED | OUTPATIENT
Start: 2024-09-23 | End: 2024-10-07

## 2024-09-23 RX ORDER — CALCIUM CARBONATE 500(1250)
2 TABLET ORAL
Refills: 0 | DISCHARGE

## 2024-09-23 RX ORDER — ONDANSETRON HCL/PF 4 MG/2 ML
4 VIAL (ML) INJECTION ONCE
Refills: 0 | Status: COMPLETED | OUTPATIENT
Start: 2024-09-23 | End: 2024-09-23

## 2024-09-23 RX ORDER — CYANOCOBALAMIN (VITAMIN B-12) 500MCG/0.1
0 GEL (ML) NASAL
Refills: 0 | DISCHARGE

## 2024-09-23 RX ORDER — TRAZODONE HCL 50 MG
0.5 TABLET ORAL
Refills: 0 | DISCHARGE

## 2024-09-23 RX ADMIN — Medication 4 MILLIGRAM(S): at 17:26

## 2024-09-23 NOTE — CHART NOTE - NSCHARTNOTEFT_GEN_A_CORE
IR Follow-Up     Patient's chest xray on 09/23 was reviewed. No complications.   Patient is cleared to eat and for discharge when ready.     --  Tommy Fair MD  Diagnostic Radiology Resident (PGY-5)  IR Pager: [phone] ([facility]) / [phone] ([facility])

## 2024-09-23 NOTE — PROCEDURE NOTE - PROCEDURE FINDINGS AND DETAILS
CT left adrenal mass performed howeever patient unable to tolerate positioning with focal bleed seen after initial FNA . When attempting to reposition, pleural transgression noted. Procedure aborted. CXR obtained Delayed images obtained showing no ptx and stable appearace of bleed. 1 hour after to confirm no PTX.

## 2024-09-23 NOTE — PRE PROCEDURE NOTE - HISTORY OF PRESENT ILLNESS
Interventional Radiology  Pre-Procedure Note    This is a 84y  Female  presenting for adrenal biopsy    HPI:  84-year-old Mandarin speaking female, hx of Dm, s/p FB, uniportal right VATS, right upper lobectomy with en bloc wedge resection of RML, and MLND on 1/10/18. Pathology revealed RUL T2aN0 adenocarcinoma, tumor involves RML, 1.2 x 0.8 x 0.5 cm, positive visceral pleura and lymph vascular invasion. She has completed chemotherapy with Dr. Davis (Oncology), not f/u currently.    CT Chest/Abdomen/Pelvis on 24: Coronary artery calcifications; Mitral annular calcifications. Post op changes New 4 mm RLL nodule (series 2, image 77) Stable few pulmonary nodules including a solid 4 mm RML nodule (series 2, image 53) No intrahepatic biliary dilatation Gallbladder: Fluid filled Adrenal glands: Interval increase in size of a 1.9 x 1.2 cm left adrenal nodule (series 2, image 152). Mild compression deformity of the L1 vertebral body. atient was seen by Dr. Corbin and is now being referred for left adrenal nodule bx.     PAST MEDICAL & SURGICAL HISTORY:  Language barrier  native language Mandarin, Chinese; comprehends and verbalizes no Englsih      Hypothyroid      Hypercholesterolemia      Obesity      Snoring  BETITO precautions -- responds affirmatively to STOP BANG questionnaire -- admits to loud snoring; age > 50; h/o htn      Nephrolithiasis  9 mm kidney stone - left proximal ureter      Seasonal allergies      Insomnia      Sciatica  injection done in Dec. 2017      Arthritis      Hypertension      Vitamin D deficiency      Lung cancer      Adenocarcinoma  of right lung - s/p VATS 2018 - chemotherapy (current)      Paroxysmal atrial fibrillation  diagnosed 2018      Diabetes type 2, controlled      Lumbar radiculopathy      S/P appendectomy  greater than 30 years ago      History of lung surgery  1/10/18 - unilateral right VATS RU lobectomy with en bloc wedge resection of RML, MLND, and intercostal nerve block      H/O: hysterectomy   -menorrhagia, benign disease      Status post lumbar laminectomy          Social History:     FAMILY HISTORY:  Family history of cancer (Sibling)  brother, and 2 sisters have         Allergies: No Known Allergies      Current Medications:     Labs:   From HIE /  H/H 10.6/32.8  PLT  140    INR 0.93    BUN/CR 16/1.09      Assessment/Plan:   This is a 84y Female  presents with left adrenal mass  Patient presents to IR for biopsy.  Procedure/ risks/ benefits/ goals/ alternatives were explained. All questions answered. Informed content obtained from patient. Consent placed in chart.

## 2024-09-25 LAB — NON-GYNECOLOGICAL CYTOLOGY STUDY: SIGNIFICANT CHANGE UP

## 2024-09-26 ENCOUNTER — APPOINTMENT (OUTPATIENT)
Dept: PULMONOLOGY | Facility: CLINIC | Age: 84
End: 2024-09-26
Payer: MEDICARE

## 2024-09-26 VITALS — HEART RATE: 69 BPM | DIASTOLIC BLOOD PRESSURE: 70 MMHG | OXYGEN SATURATION: 95 % | SYSTOLIC BLOOD PRESSURE: 110 MMHG

## 2024-09-26 DIAGNOSIS — Z23 ENCOUNTER FOR IMMUNIZATION: ICD-10-CM

## 2024-09-26 PROCEDURE — G0008: CPT

## 2024-09-26 PROCEDURE — 90662 IIV NO PRSV INCREASED AG IM: CPT

## 2024-09-28 PROBLEM — Z23 ENCOUNTER FOR IMMUNIZATION: Status: ACTIVE | Noted: 2023-12-07 | Resolved: 2024-10-10

## 2024-10-03 ENCOUNTER — APPOINTMENT (OUTPATIENT)
Dept: PULMONOLOGY | Facility: CLINIC | Age: 84
End: 2024-10-03

## 2024-10-23 ENCOUNTER — NON-APPOINTMENT (OUTPATIENT)
Age: 84
End: 2024-10-23

## 2024-10-29 ENCOUNTER — APPOINTMENT (OUTPATIENT)
Dept: GERIATRICS | Facility: CLINIC | Age: 84
End: 2024-10-29
Payer: MEDICARE

## 2024-10-29 ENCOUNTER — APPOINTMENT (OUTPATIENT)
Dept: THORACIC SURGERY | Facility: CLINIC | Age: 84
End: 2024-10-29
Payer: MEDICARE

## 2024-10-29 ENCOUNTER — NON-APPOINTMENT (OUTPATIENT)
Age: 84
End: 2024-10-29

## 2024-10-29 VITALS
TEMPERATURE: 97.6 F | HEIGHT: 66 IN | DIASTOLIC BLOOD PRESSURE: 60 MMHG | WEIGHT: 135 LBS | RESPIRATION RATE: 12 BRPM | HEART RATE: 72 BPM | SYSTOLIC BLOOD PRESSURE: 110 MMHG | OXYGEN SATURATION: 99 % | BODY MASS INDEX: 21.69 KG/M2

## 2024-10-29 VITALS
WEIGHT: 135 LBS | HEIGHT: 66 IN | RESPIRATION RATE: 17 BRPM | DIASTOLIC BLOOD PRESSURE: 79 MMHG | HEART RATE: 66 BPM | SYSTOLIC BLOOD PRESSURE: 117 MMHG | BODY MASS INDEX: 21.69 KG/M2 | OXYGEN SATURATION: 96 %

## 2024-10-29 DIAGNOSIS — F02.B0 ALZHEIMER'S DISEASE WITH LATE ONSET: ICD-10-CM

## 2024-10-29 DIAGNOSIS — R91.8 OTHER NONSPECIFIC ABNORMAL FINDING OF LUNG FIELD: ICD-10-CM

## 2024-10-29 DIAGNOSIS — E11.9 TYPE 2 DIABETES MELLITUS W/OUT COMPLICATIONS: ICD-10-CM

## 2024-10-29 DIAGNOSIS — C34.91 MALIGNANT NEOPLASM OF UNSPECIFIED PART OF RIGHT BRONCHUS OR LUNG: ICD-10-CM

## 2024-10-29 DIAGNOSIS — R63.4 ABNORMAL WEIGHT LOSS: ICD-10-CM

## 2024-10-29 DIAGNOSIS — G30.1 ALZHEIMER'S DISEASE WITH LATE ONSET: ICD-10-CM

## 2024-10-29 DIAGNOSIS — E27.9 DISORDER OF ADRENAL GLAND, UNSPECIFIED: ICD-10-CM

## 2024-10-29 DIAGNOSIS — F32.A DEPRESSION, UNSPECIFIED: ICD-10-CM

## 2024-10-29 PROCEDURE — 99214 OFFICE O/P EST MOD 30 MIN: CPT

## 2024-10-29 RX ORDER — SERTRALINE HYDROCHLORIDE 50 MG/1
50 TABLET, FILM COATED ORAL DAILY
Qty: 90 | Refills: 0 | Status: ACTIVE | COMMUNITY
Start: 2024-10-29 | End: 1900-01-01

## 2024-10-29 RX ORDER — QUETIAPINE FUMARATE 25 MG/1
25 TABLET ORAL
Qty: 60 | Refills: 0 | Status: ACTIVE | COMMUNITY
Start: 2024-10-29 | End: 1900-01-01

## 2024-11-19 ENCOUNTER — OUTPATIENT (OUTPATIENT)
Dept: OUTPATIENT SERVICES | Facility: HOSPITAL | Age: 84
LOS: 1 days | End: 2024-11-19
Payer: MEDICARE

## 2024-11-19 ENCOUNTER — APPOINTMENT (OUTPATIENT)
Dept: CT IMAGING | Facility: IMAGING CENTER | Age: 84
End: 2024-11-19

## 2024-11-19 DIAGNOSIS — Z98.890 OTHER SPECIFIED POSTPROCEDURAL STATES: Chronic | ICD-10-CM

## 2024-11-19 DIAGNOSIS — Z90.49 ACQUIRED ABSENCE OF OTHER SPECIFIED PARTS OF DIGESTIVE TRACT: Chronic | ICD-10-CM

## 2024-11-19 DIAGNOSIS — C34.91 MALIGNANT NEOPLASM OF UNSPECIFIED PART OF RIGHT BRONCHUS OR LUNG: ICD-10-CM

## 2024-11-19 PROCEDURE — 71250 CT THORAX DX C-: CPT

## 2024-11-19 PROCEDURE — 71250 CT THORAX DX C-: CPT | Mod: 26,MH

## 2024-12-03 ENCOUNTER — APPOINTMENT (OUTPATIENT)
Dept: THORACIC SURGERY | Facility: CLINIC | Age: 84
End: 2024-12-03
Payer: MEDICARE

## 2024-12-03 VITALS
HEIGHT: 64 IN | OXYGEN SATURATION: 95 % | RESPIRATION RATE: 16 BRPM | HEART RATE: 75 BPM | BODY MASS INDEX: 22.2 KG/M2 | WEIGHT: 130 LBS | DIASTOLIC BLOOD PRESSURE: 73 MMHG | SYSTOLIC BLOOD PRESSURE: 112 MMHG

## 2024-12-03 DIAGNOSIS — R91.8 OTHER NONSPECIFIC ABNORMAL FINDING OF LUNG FIELD: ICD-10-CM

## 2024-12-03 DIAGNOSIS — C34.91 MALIGNANT NEOPLASM OF UNSPECIFIED PART OF RIGHT BRONCHUS OR LUNG: ICD-10-CM

## 2024-12-03 PROCEDURE — 99213 OFFICE O/P EST LOW 20 MIN: CPT

## 2025-04-08 ENCOUNTER — APPOINTMENT (OUTPATIENT)
Dept: CT IMAGING | Facility: HOSPITAL | Age: 85
End: 2025-04-08
Payer: MEDICARE

## 2025-04-08 ENCOUNTER — OUTPATIENT (OUTPATIENT)
Dept: OUTPATIENT SERVICES | Facility: HOSPITAL | Age: 85
LOS: 1 days | End: 2025-04-08
Payer: MEDICARE

## 2025-04-08 DIAGNOSIS — C34.91 MALIGNANT NEOPLASM OF UNSPECIFIED PART OF RIGHT BRONCHUS OR LUNG: ICD-10-CM

## 2025-04-08 DIAGNOSIS — Z98.890 OTHER SPECIFIED POSTPROCEDURAL STATES: Chronic | ICD-10-CM

## 2025-04-08 DIAGNOSIS — Z90.49 ACQUIRED ABSENCE OF OTHER SPECIFIED PARTS OF DIGESTIVE TRACT: Chronic | ICD-10-CM

## 2025-04-08 PROCEDURE — 71250 CT THORAX DX C-: CPT | Mod: 26

## 2025-04-08 PROCEDURE — 71250 CT THORAX DX C-: CPT

## 2025-04-21 ENCOUNTER — APPOINTMENT (OUTPATIENT)
Dept: ORTHOPEDIC SURGERY | Facility: CLINIC | Age: 85
End: 2025-04-21
Payer: MEDICARE

## 2025-04-21 DIAGNOSIS — Z98.890 OTHER SPECIFIED POSTPROCEDURAL STATES: ICD-10-CM

## 2025-04-21 DIAGNOSIS — M41.9 SCOLIOSIS, UNSPECIFIED: ICD-10-CM

## 2025-04-21 PROCEDURE — 72220 X-RAY EXAM SACRUM TAILBONE: CPT

## 2025-04-21 PROCEDURE — 72110 X-RAY EXAM L-2 SPINE 4/>VWS: CPT

## 2025-04-21 PROCEDURE — 72170 X-RAY EXAM OF PELVIS: CPT

## 2025-04-21 PROCEDURE — 99214 OFFICE O/P EST MOD 30 MIN: CPT

## 2025-04-21 RX ORDER — MELOXICAM 7.5 MG/1
7.5 TABLET ORAL DAILY
Qty: 30 | Refills: 2 | Status: ACTIVE | COMMUNITY
Start: 2025-04-21 | End: 1900-01-01

## 2025-04-21 RX ORDER — TIZANIDINE 2 MG/1
2 TABLET ORAL EVERY 8 HOURS
Qty: 30 | Refills: 2 | Status: ACTIVE | COMMUNITY
Start: 2025-04-21 | End: 1900-01-01

## 2025-04-22 ENCOUNTER — APPOINTMENT (OUTPATIENT)
Dept: THORACIC SURGERY | Facility: CLINIC | Age: 85
End: 2025-04-22
Payer: MEDICARE

## 2025-04-22 VITALS
SYSTOLIC BLOOD PRESSURE: 97 MMHG | RESPIRATION RATE: 17 BRPM | BODY MASS INDEX: 22.2 KG/M2 | HEIGHT: 64 IN | HEART RATE: 76 BPM | DIASTOLIC BLOOD PRESSURE: 56 MMHG | WEIGHT: 130 LBS | OXYGEN SATURATION: 94 %

## 2025-04-22 DIAGNOSIS — C34.91 MALIGNANT NEOPLASM OF UNSPECIFIED PART OF RIGHT BRONCHUS OR LUNG: ICD-10-CM

## 2025-04-22 DIAGNOSIS — R91.8 OTHER NONSPECIFIC ABNORMAL FINDING OF LUNG FIELD: ICD-10-CM

## 2025-04-22 PROCEDURE — 99214 OFFICE O/P EST MOD 30 MIN: CPT

## 2025-05-23 RX ORDER — DONEPEZIL HYDROCHLORIDE 5 MG/1
5 TABLET ORAL DAILY
Qty: 30 | Refills: 1 | Status: ACTIVE | COMMUNITY
Start: 2025-05-23 | End: 1900-01-01

## 2025-06-20 ENCOUNTER — APPOINTMENT (OUTPATIENT)
Dept: UROLOGY | Facility: CLINIC | Age: 85
End: 2025-06-20

## 2025-07-09 NOTE — ASU PATIENT PROFILE, ADULT - NS PRO AD INFO GIVEN Y
Problem: Safety - Adult  Goal: Free from fall injury  7/9/2025 0926 by Kali Bennett RN  Outcome: Progressing  7/8/2025 2313 by Meghna Black RN  Outcome: Progressing     Problem: Discharge Planning  Goal: Discharge to home or other facility with appropriate resources  7/9/2025 0926 by Kali Bennett RN  Outcome: Progressing  7/8/2025 2313 by Meghna Black RN  Outcome: Progressing     Problem: Pain  Goal: Verbalizes/displays adequate comfort level or baseline comfort level  7/9/2025 0926 by Kali Bennett RN  Outcome: Progressing  7/8/2025 2313 by Meghna Black RN  Outcome: Progressing      yes

## 2025-08-11 ENCOUNTER — APPOINTMENT (OUTPATIENT)
Dept: CT IMAGING | Facility: CLINIC | Age: 85
End: 2025-08-11
Payer: MEDICARE

## 2025-08-11 PROCEDURE — 71250 CT THORAX DX C-: CPT

## 2025-08-19 ENCOUNTER — APPOINTMENT (OUTPATIENT)
Dept: THORACIC SURGERY | Facility: CLINIC | Age: 85
End: 2025-08-19

## 2025-08-19 DIAGNOSIS — C34.91 MALIGNANT NEOPLASM OF UNSPECIFIED PART OF RIGHT BRONCHUS OR LUNG: ICD-10-CM

## 2025-09-11 ENCOUNTER — NON-APPOINTMENT (OUTPATIENT)
Age: 85
End: 2025-09-11

## (undated) DEVICE — DRAPE COVER TABLE 44X75"

## (undated) DEVICE — SUT MONOCRYL 3-0 27" PS-2 UNDYED

## (undated) DEVICE — SOLIDIFIER CANN EXPRESS 3K

## (undated) DEVICE — SUT POLYSORB 1 30" GS-10 UNDYED

## (undated) DEVICE — POSITIONER STRAP ARMBOARD VELCRO TS-30

## (undated) DEVICE — DRSG CURITY GAUZE SPONGE 4 X 4" 12-PLY

## (undated) DEVICE — DRAPE 3/4 SHEET W REINFORCEMENT 56X77"

## (undated) DEVICE — ELCTR STRYKER NEPTUNE SMOKE EVACUATION PENCIL (GREEN)

## (undated) DEVICE — SPECIMEN CONTAINER 100ML

## (undated) DEVICE — NDL SPINAL 18G X 3.5" (PINK)

## (undated) DEVICE — ELCTR BOVIE TIP BLADE INSULATED 2.75" EDGE

## (undated) DEVICE — ELCTR BOVIE TIP BLADE INSULATED 4" EDGE

## (undated) DEVICE — GLV 8.5 PROTEXIS (WHITE)

## (undated) DEVICE — VENODYNE/SCD SLEEVE CALF MEDIUM

## (undated) DEVICE — PREP DURAPREP 26CC

## (undated) DEVICE — SUT MONOSOF 2-0 18" C-15

## (undated) DEVICE — DRAPE MAYO STAND 30"

## (undated) DEVICE — DRAPE TOWEL BLUE 17" X 24"

## (undated) DEVICE — DRSG STERISTRIPS 0.5 X 4"

## (undated) DEVICE — SOL TOPICAL POVIDONE IODINE PVP-1

## (undated) DEVICE — VENODYNE/SCD SLEEVE CALF LARGE

## (undated) DEVICE — CANISTER SUCTION LID GUARD 3000CC

## (undated) DEVICE — DRAPE C ARM UNIVERSAL

## (undated) DEVICE — VISITEC 4X4

## (undated) DEVICE — SOL IRR POUR NS 0.9% 500ML

## (undated) DEVICE — GLV 7.5 PROTEXIS (WHITE)

## (undated) DEVICE — SOL IRR POUR H2O 1500ML

## (undated) DEVICE — SUT POLYSORB 2-0 30" GS-11 UNDYED

## (undated) DEVICE — BUR CONMED ULTRAPOWER ROUND CYLINDER 3MM

## (undated) DEVICE — POSITIONER FOAM HEAD CRADLE (PINK)

## (undated) DEVICE — SUT POLYSORB 1 27" GS-21 UNDYED

## (undated) DEVICE — PACK SPINE

## (undated) DEVICE — WARMING BLANKET UPPER ADULT

## (undated) DEVICE — GLV 8 PROTEXIS (WHITE)

## (undated) DEVICE — POSITIONER JACKSON TABLE HEADREST 7", CHEST, HIP, THIGH PADS

## (undated) DEVICE — DRAPE MICROSCOPE LEICA 26" X 150"